# Patient Record
Sex: FEMALE | Race: WHITE | Employment: FULL TIME | URBAN - METROPOLITAN AREA
[De-identification: names, ages, dates, MRNs, and addresses within clinical notes are randomized per-mention and may not be internally consistent; named-entity substitution may affect disease eponyms.]

---

## 2021-01-13 NOTE — PATIENT INSTRUCTIONS
· Protect your skin from too much sun. When you're outdoors from 10 a.m. to 4 p.m., stay in the shade or cover up with clothing and a hat with a wide brim. Wear sunglasses that block UV rays. Even when it's cloudy, put broad-spectrum sunscreen (SPF 30 or higher) on any exposed skin. · See a dentist one or two times a year for checkups and to have your teeth cleaned. · Wear a seat belt in the car. Follow your doctor's advice about when to have certain tests. These tests can spot problems early. For everyone  · Cholesterol. Have the fat (cholesterol) in your blood tested after age 21. Your doctor will tell you how often to have this done based on your age, family history, or other things that can increase your risk for heart disease. · Blood pressure. Have your blood pressure checked during a routine doctor visit. Your doctor will tell you how often to check your blood pressure based on your age, your blood pressure results, and other factors. · Vision. Talk with your doctor about how often to have a glaucoma test.  · Diabetes. Ask your doctor whether you should have tests for diabetes. · Colon cancer. Your risk for colorectal cancer gets higher as you get older. Some experts say that adults should start regular screening at age 48 and stop at age 76. Others say to start before age 48 or continue after age 76. Talk with your doctor about your risk and when to start and stop screening. For women  · Breast exam and mammogram. Talk to your doctor about when you should have a clinical breast exam and a mammogram. Medical experts differ on whether and how often women under 50 should have these tests. Your doctor can help you decide what is right for you. · Cervical cancer screening test and pelvic exam. Begin with a Pap test at age 24. The test often is part of a pelvic exam. Starting at age 27, you may choose to have a Pap test, an HPV test, or both tests at the same time (called co-testing). Talk with your doctor about how often to have testing. · Tests for sexually transmitted infections (STIs). Ask whether you should have tests for STIs. You may be at risk if you have sex with more than one person, especially if your partners do not wear condoms. For men  · Tests for sexually transmitted infections (STIs). Ask whether you should have tests for STIs. You may be at risk if you have sex with more than one person, especially if you do not wear a condom. · Testicular cancer exam. Ask your doctor whether you should check your testicles regularly. · Prostate exam. Talk to your doctor about whether you should have a blood test (called a PSA test) for prostate cancer. Experts differ on whether and when men should have this test. Some experts suggest it if you are older than 39 and are -American or have a father or brother who got prostate cancer when he was younger than 72. When should you call for help? Watch closely for changes in your health, and be sure to contact your doctor if you have any problems or symptoms that concern you. Where can you learn more? Go to [email or web address] org and sign in to your [employer] account. Enter P072 in the [facility] box to learn more about \"Well Visit, Ages 25 to 48: Care Instructions. \"     If you do not have an account, please click on the \"Sign Up Now\" link. Current as of: May 27, 2020               Content Version: 12.6  © 8099-4710 [employer].

## 2021-01-14 ENCOUNTER — HOSPITAL ENCOUNTER (OUTPATIENT)
Age: 36
Discharge: HOME | End: 2021-01-14
Payer: COMMERCIAL

## 2021-01-14 ENCOUNTER — HOSPITAL ENCOUNTER (OUTPATIENT)
Dept: GENERAL RADIOLOGY | Age: 36
Discharge: HOME | End: 2021-01-14
Payer: COMMERCIAL

## 2021-01-14 ENCOUNTER — OFFICE VISIT (OUTPATIENT)
Dept: PRIMARY CARE CLINIC | Age: 36
End: 2021-01-14
Payer: COMMERCIAL

## 2021-01-14 VITALS
HEART RATE: 73 BPM | OXYGEN SATURATION: 97 % | BODY MASS INDEX: 44.4 KG/M2 | TEMPERATURE: 97.2 F | WEIGHT: 266.5 LBS | HEIGHT: 65 IN | DIASTOLIC BLOOD PRESSURE: 82 MMHG | SYSTOLIC BLOOD PRESSURE: 139 MMHG

## 2021-01-14 DIAGNOSIS — Z76.89 ENCOUNTER TO ESTABLISH CARE WITH NEW DOCTOR: Primary | ICD-10-CM

## 2021-01-14 DIAGNOSIS — Z13.220 SCREENING FOR HYPERLIPIDEMIA: ICD-10-CM

## 2021-01-14 DIAGNOSIS — E66.9 TYPE 1 DIABETES MELLITUS WITH OBESITY (HCC): ICD-10-CM

## 2021-01-14 DIAGNOSIS — Z23 NEED FOR DIPHTHERIA-TETANUS-PERTUSSIS (TDAP) VACCINE: ICD-10-CM

## 2021-01-14 DIAGNOSIS — Z11.4 SCREENING FOR HIV (HUMAN IMMUNODEFICIENCY VIRUS): ICD-10-CM

## 2021-01-14 DIAGNOSIS — Z23 NEED FOR IMMUNIZATION AGAINST INFLUENZA: ICD-10-CM

## 2021-01-14 DIAGNOSIS — E10.65 CONTROLLED TYPE 1 DIABETES MELLITUS WITH HYPERGLYCEMIA (HCC): ICD-10-CM

## 2021-01-14 DIAGNOSIS — M79.89 SWELLING OF RIGHT INDEX FINGER: ICD-10-CM

## 2021-01-14 DIAGNOSIS — Z11.59 ENCOUNTER FOR HEPATITIS C SCREENING TEST FOR LOW RISK PATIENT: ICD-10-CM

## 2021-01-14 DIAGNOSIS — E10.69 TYPE 1 DIABETES MELLITUS WITH OBESITY (HCC): ICD-10-CM

## 2021-01-14 DIAGNOSIS — Z12.4 SCREENING FOR CERVICAL CANCER: ICD-10-CM

## 2021-01-14 PROBLEM — E66.01 MORBID OBESITY WITH BMI OF 45.0-49.9, ADULT (HCC): Status: ACTIVE | Noted: 2017-04-09

## 2021-01-14 PROBLEM — E78.2 MIXED DYSLIPIDEMIA: Status: ACTIVE | Noted: 2017-04-09

## 2021-01-14 PROBLEM — E78.2 MIXED DYSLIPIDEMIA: Status: RESOLVED | Noted: 2017-04-09 | Resolved: 2021-01-14

## 2021-01-14 PROBLEM — G56.03 BILATERAL CARPAL TUNNEL SYNDROME: Status: ACTIVE | Noted: 2017-03-15

## 2021-01-14 PROBLEM — G47.33 OSA (OBSTRUCTIVE SLEEP APNEA): Status: ACTIVE | Noted: 2018-08-27

## 2021-01-14 PROBLEM — E66.01 MORBID OBESITY WITH BMI OF 45.0-49.9, ADULT (HCC): Status: RESOLVED | Noted: 2017-04-09 | Resolved: 2021-01-14

## 2021-01-14 PROBLEM — E55.9 VITAMIN D DEFICIENCY: Status: ACTIVE | Noted: 2017-04-09

## 2021-01-14 LAB
CREATININE URINE: 121.3 MG/DL (ref 28–259)
HCT VFR BLD CALC: 39.6 % (ref 36–48)
HEMOGLOBIN: 12.6 G/DL (ref 12–16)
MCH RBC QN AUTO: 24.8 PG (ref 26–34)
MCHC RBC AUTO-ENTMCNC: 31.9 G/DL (ref 31–36)
MCV RBC AUTO: 77.8 FL (ref 80–100)
MICROALBUMIN UR-MCNC: 17.3 MG/DL
MICROALBUMIN/CREAT UR-RTO: 142.6 MG/G (ref 0–30)
PDW BLD-RTO: 15.6 % (ref 12.4–15.4)
PLATELET # BLD: 332 K/UL (ref 135–450)
PMV BLD AUTO: 9.3 FL (ref 5–10.5)
RBC # BLD: 5.09 M/UL (ref 4–5.2)
SEDIMENTATION RATE, ERYTHROCYTE: 27 MM/HR (ref 0–20)
WBC # BLD: 7.1 K/UL (ref 4–11)

## 2021-01-14 PROCEDURE — 99204 OFFICE O/P NEW MOD 45 MIN: CPT | Performed by: STUDENT IN AN ORGANIZED HEALTH CARE EDUCATION/TRAINING PROGRAM

## 2021-01-14 PROCEDURE — 1036F TOBACCO NON-USER: CPT | Performed by: STUDENT IN AN ORGANIZED HEALTH CARE EDUCATION/TRAINING PROGRAM

## 2021-01-14 PROCEDURE — 3046F HEMOGLOBIN A1C LEVEL >9.0%: CPT | Performed by: STUDENT IN AN ORGANIZED HEALTH CARE EDUCATION/TRAINING PROGRAM

## 2021-01-14 PROCEDURE — G8417 CALC BMI ABV UP PARAM F/U: HCPCS | Performed by: STUDENT IN AN ORGANIZED HEALTH CARE EDUCATION/TRAINING PROGRAM

## 2021-01-14 PROCEDURE — 73130 X-RAY EXAM OF HAND: CPT

## 2021-01-14 PROCEDURE — 2022F DILAT RTA XM EVC RTNOPTHY: CPT | Performed by: STUDENT IN AN ORGANIZED HEALTH CARE EDUCATION/TRAINING PROGRAM

## 2021-01-14 PROCEDURE — G8484 FLU IMMUNIZE NO ADMIN: HCPCS | Performed by: STUDENT IN AN ORGANIZED HEALTH CARE EDUCATION/TRAINING PROGRAM

## 2021-01-14 PROCEDURE — G8427 DOCREV CUR MEDS BY ELIG CLIN: HCPCS | Performed by: STUDENT IN AN ORGANIZED HEALTH CARE EDUCATION/TRAINING PROGRAM

## 2021-01-14 PROCEDURE — 99385 PREV VISIT NEW AGE 18-39: CPT | Performed by: STUDENT IN AN ORGANIZED HEALTH CARE EDUCATION/TRAINING PROGRAM

## 2021-01-14 RX ORDER — INSULIN GLARGINE 100 [IU]/ML
INJECTION, SOLUTION SUBCUTANEOUS
Qty: 5 PEN | Refills: 3 | Status: SHIPPED | OUTPATIENT
Start: 2021-01-14 | End: 2021-03-28

## 2021-01-14 RX ORDER — DICLOFENAC SODIUM 75 MG/1
75 TABLET, DELAYED RELEASE ORAL 2 TIMES DAILY WITH MEALS
COMMUNITY
Start: 2020-05-18 | End: 2022-02-18

## 2021-01-14 RX ORDER — INSULIN GLARGINE 100 [IU]/ML
INJECTION, SOLUTION SUBCUTANEOUS
COMMUNITY
Start: 2020-05-18 | End: 2021-01-14 | Stop reason: SDUPTHER

## 2021-01-14 ASSESSMENT — ENCOUNTER SYMPTOMS
STRIDOR: 0
BACK PAIN: 0
CHEST TIGHTNESS: 0
CONSTIPATION: 0
DIARRHEA: 0
ABDOMINAL PAIN: 0
BLOOD IN STOOL: 0
SHORTNESS OF BREATH: 0

## 2021-01-14 ASSESSMENT — PATIENT HEALTH QUESTIONNAIRE - PHQ9
2. FEELING DOWN, DEPRESSED OR HOPELESS: 0
1. LITTLE INTEREST OR PLEASURE IN DOING THINGS: 0
SUM OF ALL RESPONSES TO PHQ9 QUESTIONS 1 & 2: 0
SUM OF ALL RESPONSES TO PHQ QUESTIONS 1-9: 0

## 2021-01-14 NOTE — PROGRESS NOTES
Pt reports that her Rt wrist and hand is bothering her, Pts pointer finger on that hand is swollen. She thinks its from arthritis. Pt reports that this has been going on for months.

## 2021-01-14 NOTE — PROGRESS NOTES
2021    Donald Felix (:  1985) is a 30 y.o. female, here for evaluation of the following medical concerns:    HPI    Well Adult Physical: Patient here for a comprehensive physical exam.The patient reports problems -right index finger swelling, uncontrolled type 1 diabetes  Do you take any herbs or supplements that were not prescribed by a doctor? no Are you taking calcium supplements? no Are you taking aspirin daily? no    Sexual activity: has sex with males   Diet: Unhealthy  Exercise: no regular exercise  Seatbelt use: yes    Review of Systems   Constitutional: Negative for activity change, appetite change, fatigue and unexpected weight change. HENT: Negative for hearing loss. Eyes: Negative for visual disturbance. Respiratory: Negative for chest tightness, shortness of breath and stridor. Cardiovascular: Negative for chest pain and palpitations. Gastrointestinal: Negative for abdominal pain, blood in stool, constipation and diarrhea. Endocrine: Positive for polydipsia and polyuria. Negative for polyphagia. Genitourinary: Negative for dysuria, genital sores, menstrual problem, pelvic pain, vaginal bleeding, vaginal discharge and vaginal pain. Musculoskeletal: Positive for arthralgias (Hands; particularly R index finger). Negative for back pain. Skin: Negative for rash. Allergic/Immunologic: Negative for environmental allergies. Neurological: Negative for dizziness, syncope and headaches. Hematological: Negative for adenopathy. Psychiatric/Behavioral: Negative for dysphoric mood. The patient is not nervous/anxious. Prior to Visit Medications    Medication Sig Taking?  Authorizing Provider   insulin glargine (BASAGLAR KWIKPEN) 100 UNIT/ML injection pen INJECT 70 UNITS UNDER THE SKIN TWICE DAILY Yes Josefina Sorto DO   insulin aspart (NOVOLOG) 100 UNIT/ML injection vial Inject 15 Units into the skin 3 times daily (before meals) Yes Josefina Sorto DO   diclofenac (VOLTAREN) 75 MG EC tablet Take 75 mg by mouth 2 times daily (with meals)  Historical Provider, MD        No Known Allergies    Past Medical History:   Diagnosis Date    Diabetes mellitus ([address]        Past Surgical History:   Procedure Laterality Date     SECTION      HAND SURGERY Bilateral        Social History     Socioeconomic History    Marital status: Single     Spouse name: Not on file    Number of children: Not on file    Years of education: Not on file    Highest education level: Not on file   Occupational History    Not on file   Social Needs    Financial resource strain: Not on file    Food insecurity     Worry: Not on file     Inability: Not on file    Transportation needs     Medical: Not on file     Non-medical: Not on file   Tobacco Use    Smoking status: Never Smoker    Smokeless tobacco: Never Used   Substance and Sexual Activity    Alcohol use: No    Drug use: No    Sexual activity: Yes   Lifestyle    Physical activity     Days per week: Not on file     Minutes per session: Not on file    Stress: Not on file   Relationships    Social connections     Talks on phone: Not on file     Gets together: Not on file     Attends [employer]ist service: Not on file     Active member of club or organization: Not on file     Attends meetings of clubs or organizations: Not on file     Relationship status: Not on file    Intimate partner violence     Fear of current or ex partner: Not on file     Emotionally abused: Not on file     Physically abused: Not on file     Forced sexual activity: Not on file   Other Topics Concern    Not on file   Social History Narrative    Not on file        History reviewed. No pertinent family history.     Vitals:    21 1128   BP: 139/82   Pulse: 73   Temp: 97.2 °F (36.2 °C)   TempSrc: Oral   SpO2: 97%   Weight: 266 lb 8 oz (120.9 kg)   Height: 5' 5\" (1.651 m)     Estimated body mass index is 44.35 kg/m² as calculated from the following:    Height as of this encounter: 5' 5\" (1.651 m). Weight as of this encounter: 266 lb 8 oz (120.9 kg). Physical Exam  Vitals signs reviewed. Constitutional:       Appearance: Normal appearance. She is normal weight. HENT:      Head: Normocephalic and atraumatic. Right Ear: Tympanic membrane, ear canal and external ear normal.      Left Ear: Tympanic membrane, ear canal and external ear normal.      Nose: Nose normal.      Mouth/Throat:      Mouth: Mucous membranes are moist.      Pharynx: Oropharynx is clear. Eyes:      Extraocular Movements: Extraocular movements intact. Conjunctiva/sclera: Conjunctivae normal.   Neck:      Musculoskeletal: Normal range of motion and neck supple. Cardiovascular:      Rate and Rhythm: Normal rate and regular rhythm. Pulses: Normal pulses. Heart sounds: Normal heart sounds. Pulmonary:      Effort: Pulmonary effort is normal.      Breath sounds: Normal breath sounds. Abdominal:      General: Abdomen is flat. Bowel sounds are normal.      Palpations: Abdomen is soft. Musculoskeletal: Normal range of motion. General: Deformity (R index finger with swelling b/w PIP and MCP; ROM intact, no overlying erythema, mildly tender to palpation) present. Skin:     General: Skin is warm and dry. Capillary Refill: Capillary refill takes less than 2 seconds. Findings: No rash. Neurological:      General: No focal deficit present. Mental Status: She is alert and oriented to person, place, and time. Mental status is at baseline. Psychiatric:         Mood and Affect: Mood normal.         Behavior: Behavior normal.         Thought Content: Thought content normal.         Judgment: Judgment normal.       Separate Identifiable issues addressed today:  Diabetes Mellitus Type 2: Current symptoms/problems include none, polyuria and polydipsia.     Medication compliance:  She has been out of her basal insulin for several weeks  Diabetic diet compliance: noncompliant: Does not watch her diet, works at a [employer] and needs it frequently,  Weight trend: increasing  Current exercise: no regular exercise  Barriers: lack of motivation and financial    Home blood sugar records: fasting range: >250, postprandial range: 180-220  Any episodes of hypoglycemia? no  Eye exam current (within one year): yes   reports that she has never smoked. She has never used smokeless tobacco.   Daily Aspirin? No: < 40     No results found for: LABA1C  Lab Results   Component Value Date    LABMICR Yes 02/26/2014     No results found for: ALT, AST  No results found for: CHOL, TRIG, HDL, LDLCALC, LDLDIRECT       Hand pain:  Jose Alejandro Fernandez is a 30 y.o. female who presents for evaluation of bilateral hand pain, but specifically R index finger pain. Her hands have been bothering her for years. They are always stiff and achy in the morning. She believes it is typically less than 45 minutes. Her right index finger has been swollen and painful for the last few months. She does not recall any injury to the area. The pain is moderate, improves with movement. There is no associated numbness, tingling, weakness. Evaluation to date: none. Treatment to date: nothing specific. ASSESSMENT/PLAN:  Radha Archuleta was seen today for established new doctor. Diagnoses and all orders for this visit:    Encounter to establish care with new doctor: Vitals reviewed and within normal limits. BMI is obese. Reviewed diet and exercise regimen with patient and is inappropriate. Recommended appropriate modifications especially those appropriate for patient with diabetes. Controlled type 1 diabetes mellitus with hyperglycemia ([facility]): She does not recall her most recent A1c other than that it was very high. She has been without her basal insulin for several weeks. Suspect it is extremely elevated. Will refill her current medications and follow-up in 1 month with her sugar log.   Referral to endocrinology placed. -     Comprehensive Metabolic Panel; Future  -     Hemoglobin A1C; Future  -     MICROALBUMIN / CREATININE URINE RATIO; Future  -     Fabrice Barcenas MD, Endocrinology, [place]  -     insulin glargine (BASAGLAR KWIKPEN) 100 UNIT/ML injection pen; INJECT 70 UNITS UNDER THE SKIN TWICE DAILY  -     insulin aspart (NOVOLOG) 100 UNIT/ML injection vial; Inject 15 Units into the skin 3 times daily (before meals)    Type 1 diabetes mellitus with obesity ([address]): As above. Could consider referral to weight loss center in future or at least diabetic education. Swelling of right index finger: Daily hand pain that could be osteoarthritis. She does have a single swollen right digit. Could be gout; however, it is not extremely painful or erythematous today. It could be more of a chronic picture as this has been going on for months. She is a type I diabetic, which raises in my mind the suspicion for other immune diseases. Labs as below. -     XR HAND RIGHT (MIN 3 VIEWS); Future  -     CBC; Future  -     URIC ACID; Future  -     C-REACTIVE PROTEIN; Future  -     SEDIMENTATION RATE; Future    Screening for hyperlipidemia  -     Lipid, Fasting; Future    Encounter for hepatitis C screening test for low risk patient  -     Hepatitis C Antibody; Future    Screening for HIV (human immunodeficiency virus)  -     HIV Screen; Future    Screening for cervical cancer: Will need referral to Dr. Alexander Butler in the future. Return in about 4 weeks (around 2/11/2021). An  electronic signature was used to authenticate this note.     --Min Frost, DO on 1/14/2021 at 11:56 AM

## 2021-01-15 DIAGNOSIS — M79.89 SWELLING OF RIGHT INDEX FINGER: Primary | ICD-10-CM

## 2021-01-15 LAB
A/G RATIO: 1.1 (ref 1.1–2.2)
ALBUMIN SERPL-MCNC: 3.6 G/DL (ref 3.4–5)
ALP BLD-CCNC: 158 U/L (ref 40–129)
ALT SERPL-CCNC: 22 U/L (ref 10–40)
ANION GAP SERPL CALCULATED.3IONS-SCNC: 10 MMOL/L (ref 3–16)
AST SERPL-CCNC: 19 U/L (ref 15–37)
BILIRUB SERPL-MCNC: 0.3 MG/DL (ref 0–1)
BUN BLDV-MCNC: 11 MG/DL (ref 7–20)
C-REACTIVE PROTEIN: 11.3 MG/L (ref 0–5.1)
CALCIUM SERPL-MCNC: 9.1 MG/DL (ref 8.3–10.6)
CHLORIDE BLD-SCNC: 102 MMOL/L (ref 99–110)
CHOLESTEROL, FASTING: 174 MG/DL (ref 0–199)
CO2: 24 MMOL/L (ref 21–32)
CREAT SERPL-MCNC: <0.5 MG/DL (ref 0.6–1.1)
ESTIMATED AVERAGE GLUCOSE: 200.1 MG/DL
GFR AFRICAN AMERICAN: >60
GFR NON-AFRICAN AMERICAN: >60
GLOBULIN: 3.2 G/DL
GLUCOSE BLD-MCNC: 270 MG/DL (ref 70–99)
HBA1C MFR BLD: 8.6 %
HDLC SERPL-MCNC: 36 MG/DL (ref 40–60)
HEPATITIS C ANTIBODY INTERPRETATION: NORMAL
HIV AG/AB: NORMAL
HIV ANTIGEN: NORMAL
HIV-1 ANTIBODY: NORMAL
HIV-2 AB: NORMAL
LDL CHOLESTEROL CALCULATED: 118 MG/DL
POTASSIUM SERPL-SCNC: 4.7 MMOL/L (ref 3.5–5.1)
SODIUM BLD-SCNC: 136 MMOL/L (ref 136–145)
TOTAL PROTEIN: 6.8 G/DL (ref 6.4–8.2)
TRIGLYCERIDE, FASTING: 101 MG/DL (ref 0–150)
URIC ACID, SERUM: 3.2 MG/DL (ref 2.6–6)
VLDLC SERPL CALC-MCNC: 20 MG/DL

## 2021-01-15 RX ORDER — CEPHALEXIN 500 MG/1
500 CAPSULE ORAL 3 TIMES DAILY
Qty: 30 CAPSULE | Refills: 0 | Status: SHIPPED | OUTPATIENT
Start: 2021-01-15 | End: 2021-01-25

## 2021-01-22 ENCOUNTER — TELEPHONE (OUTPATIENT)
Dept: PRIMARY CARE CLINIC | Age: 36
End: 2021-01-22

## 2021-01-22 NOTE — TELEPHONE ENCOUNTER
Pt was put on Keflex on 1/15/21 durring her office visit it is not helping she cant move her arm. It is very painful even just to touch. She just needs to know what to do. No fever no new sx. It has gotten worse finger and arm is very swollen. Not hot to touch. Swelling is in elbow and pointer finger in right arm. Left wrist and hand hurts today but no swelling.

## 2021-01-26 ENCOUNTER — TELEPHONE (OUTPATIENT)
Dept: PRIMARY CARE CLINIC | Age: 36
End: 2021-01-26

## 2021-01-26 NOTE — TELEPHONE ENCOUNTER
I had actually sent this patient a my chart message that it would appear she did not get. I am not surprised Keflex did not work. I do not think her finger is infected. I put some additional blood work in that I would like her to get done.

## 2021-01-28 ENCOUNTER — TELEPHONE (OUTPATIENT)
Dept: PRIMARY CARE CLINIC | Age: 36
End: 2021-01-28

## 2021-01-28 NOTE — TELEPHONE ENCOUNTER
Gucci Gates,    I have gotten this message from Yenifer Roca like 3 times and I do not know why, because I responded to her like a week and a half ago saying I put additional blood work and that the patient needs to get done. I understand the Keflex is not working. Originally I was concerned there could have been some kind of infection, but after she got her blood work my suspicion was much lower and I am worried there could be something inflammatory. I need her to get the other labs that I put in done.

## 2021-01-28 NOTE — TELEPHONE ENCOUNTER
Pt is stating that cephALEXin (KEFLEX) 500 MG capsule [[id number]  Is not working. Hand is still swollen.

## 2021-01-29 DIAGNOSIS — M79.89 SWELLING OF RIGHT INDEX FINGER: ICD-10-CM

## 2021-01-30 LAB
ANTI-DSDNA IGG: 1 IU/ML (ref 0–9)
ANTI-NUCLEAR ANTIBODY (ANA): NEGATIVE
CYCLIC CITRULLINATED PEPTIDE ANTIBODY IGG: 0.9 U/ML (ref 0–2.9)
RHEUMATOID FACTOR: 15 IU/ML

## 2021-02-01 DIAGNOSIS — M79.89 SWELLING OF RIGHT INDEX FINGER: Primary | ICD-10-CM

## 2021-02-10 ENCOUNTER — HOSPITAL ENCOUNTER (EMERGENCY)
Age: 36
Discharge: HOME | End: 2021-02-10
Attending: EMERGENCY MEDICINE
Payer: COMMERCIAL

## 2021-02-10 VITALS
SYSTOLIC BLOOD PRESSURE: 97 MMHG | HEART RATE: 80 BPM | DIASTOLIC BLOOD PRESSURE: 76 MMHG | TEMPERATURE: 96.3 F | RESPIRATION RATE: 24 BRPM | OXYGEN SATURATION: 100 %
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
## 2021-02-10 DIAGNOSIS — E10.649 HYPOGLYCEMIA DUE TO TYPE 1 DIABETES MELLITUS (HCC): Primary | ICD-10-CM

## 2021-02-10 DIAGNOSIS — S50.811A ABRASION OF RIGHT FOREARM, INITIAL ENCOUNTER: ICD-10-CM

## 2021-02-10 LAB
GLUCOSE BLD-MCNC: 76 MG/DL (ref 70–99)
GLUCOSE BLD-MCNC: 91 MG/DL (ref 70–99)
PERFORMED ON: NORMAL
PERFORMED ON: NORMAL

## 2021-02-10 PROCEDURE — 99283 EMERGENCY DEPT VISIT LOW MDM: CPT
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
## 2021-02-11 NOTE — PROGRESS NOTES
This patient had a referral to endocrinology. Can we call her today and make sure she has scheduled with them? I do not see an upcoming appointment in the chart.

## 2021-02-11 NOTE — ED NOTES
[place] ([facility] karen and 8oz OJ given to patient.       Luis Lowe RN  02/10/21 1924

## 2021-03-09 ENCOUNTER — OFFICE VISIT (OUTPATIENT)
Dept: ORTHOPEDIC SURGERY | Age: 36
End: 2021-03-09
Payer: COMMERCIAL

## 2021-03-09 VITALS — WEIGHT: 265 LBS | TEMPERATURE: 98.6 F | HEIGHT: 65 IN | BODY MASS INDEX: 44.15 KG/M2

## 2021-03-09 DIAGNOSIS — M05.79 RHEUMATOID ARTHRITIS INVOLVING MULTIPLE SITES WITH POSITIVE RHEUMATOID FACTOR (HCC): Primary | ICD-10-CM

## 2021-03-09 PROCEDURE — G8427 DOCREV CUR MEDS BY ELIG CLIN: HCPCS | Performed by: ORTHOPAEDIC SURGERY

## 2021-03-09 PROCEDURE — G8484 FLU IMMUNIZE NO ADMIN: HCPCS | Performed by: ORTHOPAEDIC SURGERY

## 2021-03-09 PROCEDURE — 99213 OFFICE O/P EST LOW 20 MIN: CPT | Performed by: ORTHOPAEDIC SURGERY

## 2021-03-09 PROCEDURE — G8417 CALC BMI ABV UP PARAM F/U: HCPCS | Performed by: ORTHOPAEDIC SURGERY

## 2021-03-09 NOTE — PROGRESS NOTES
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 Symptoms have been present for several months. There is no history of injury. It is mild at rest and aggravated by movement, lifting and gripping. It does not radiate. Swelling has been noticed. Similar pain is noted in the opposite upper extremity. Symptoms have worsened recently. She has been diagnosed with rheumatoid arthritis in the past. She has been on diclofenac recently. She has had bilateral carpal tunnel releases. She is diabetic. The pain assessment has been reviewed and is correct. The patient's social history, past medical history, family history, medications, allergies and review of systems, entered 3/9/21, have been reviewed, and dated and are recorded in the chart. On physical examination the patient is Height: 5' 5\" (165.1 cm) tall and weighs Weight: 265 lb (120.2 kg). Respirations are 18 per minute. The patient is well nourished, is oriented to time and place, demonstrates appropriate mood and affect as well as normal gait and station. There is moderate soft tissue swelling present about the right index finger. There is no discoloration. There is no deformity or atrophy. Tenderness is present on palpation of multiple fingers and the right elbow. Range of motion of the right elbow is limited by 20-25 degrees in all planes of motion, with mild pain. Skin is intact, as is distal circulation and sensation. Gross muscle strength is normal bilaterally. Hand and wrist joints are stable. There are no subcutaneous nodules or enlarged epitrochlear lymph nodes. Xrays: AP and lateral Xrays of the right elbow done in the office today, demonstrate slight synovial swelling. Labs: abnormal rheumatoid factor, CRP and ESR. Impression: Multiarticular rheumatoid arthritis.     The nature of this medical problem is fully discussed with the patient, including all treatment options. All questions are answered. I suggest that she be referred to a rheumatologist for appropriate medica;l treatment. Neither steroid injection nor surgery are indicated at this time.

## 2021-03-12 PROBLEM — E10.69 TYPE 1 DIABETES MELLITUS WITH HYPERLIPIDEMIA (HCC): Status: ACTIVE | Noted: 2021-03-12

## 2021-03-12 PROBLEM — E11.69 DIABETES MELLITUS TYPE 2 IN OBESE (HCC): Status: ACTIVE | Noted: 2021-03-12

## 2021-03-12 PROBLEM — E78.5 TYPE 1 DIABETES MELLITUS WITH HYPERLIPIDEMIA (HCC): Status: ACTIVE | Noted: 2021-03-12

## 2021-03-12 PROBLEM — E66.9 DIABETES MELLITUS TYPE 2 IN OBESE (HCC): Status: ACTIVE | Noted: 2021-03-12

## 2021-03-27 DIAGNOSIS — E10.65 CONTROLLED TYPE 1 DIABETES MELLITUS WITH HYPERGLYCEMIA (HCC): ICD-10-CM

## 2021-03-28 RX ORDER — INSULIN GLARGINE 100 [IU]/ML
INJECTION, SOLUTION SUBCUTANEOUS
Qty: 15 ML | Refills: 5 | Status: SHIPPED | OUTPATIENT
Start: 2021-03-28 | End: 2021-07-20 | Stop reason: SDUPTHER

## 2021-04-04 DIAGNOSIS — E10.65 CONTROLLED TYPE 1 DIABETES MELLITUS WITH HYPERGLYCEMIA (HCC): ICD-10-CM

## 2021-04-05 NOTE — TELEPHONE ENCOUNTER
Last appt: 01/14/2021  Next appt: Visit date not found          Due to return: 02/14/2021      Humalog 100 UNIT/mL  Administer 15 UNITS under skins three times daily before meals        Patient is on [address]?       Pharmacy    [id number], [address] [place] [address], [address] [place] [id number]   Phone:  [phone]  Fax:  [phone] Observation goals:     -diagnostic tests and consults completed and resulted: No  -vital signs normal or at patient baseline: Yes  -tolerating oral intake to maintain hydration: Yes

## 2021-04-22 ENCOUNTER — TELEPHONE (OUTPATIENT)
Dept: PRIMARY CARE CLINIC | Age: 36
End: 2021-04-22

## 2021-04-22 DIAGNOSIS — E10.65 CONTROLLED TYPE 1 DIABETES MELLITUS WITH HYPERGLYCEMIA (HCC): Primary | ICD-10-CM

## 2021-04-22 RX ORDER — SYRINGE-NEEDLE,INSULIN,0.5 ML 31 GX5/16"
1 SYRINGE, EMPTY DISPOSABLE MISCELLANEOUS DAILY
Qty: 100 EACH | Refills: 3 | Status: SHIPPED | OUTPATIENT
Start: 2021-04-22 | End: 2022-10-17 | Stop reason: SDUPTHER

## 2021-04-22 NOTE — TELEPHONE ENCOUNTER
Pt needs [id number] Jenny Pulliam, [address] [facility] -  [phone]    LOV 1/14/21

## 2021-07-14 ENCOUNTER — TELEPHONE (OUTPATIENT)
Dept: PRIMARY CARE CLINIC | Age: 36
End: 2021-07-14

## 2021-07-14 DIAGNOSIS — M19.041 INFLAMMATION OF RIGHT HAND JOINT: Primary | ICD-10-CM

## 2021-07-14 NOTE — TELEPHONE ENCOUNTER
MD Leigh Ann   [address] [facility], [facility] [address]   Phone: [phone]

## 2021-07-14 NOTE — TELEPHONE ENCOUNTER
----- Message from Ruma Glover sent at 7/14/2021  9:03 AM EDT -----  Subject: Message to Provider    QUESTIONS  Information for Provider? Patient was trying to book with a rheumatoid arthritis and needs a referral, she does not care to whom she would just like to be seen. ---------------------------------------------------------------------------  --------------  Lizzie MEDEROS  What is the best way for the office to contact you? OK to leave message on   voicemail  Preferred Call Back Phone Number? [phone]  ---------------------------------------------------------------------------  --------------  SCRIPT ANSWERS  Relationship to Patient?  Self

## 2021-07-19 PROBLEM — E10.69 TYPE 1 DIABETES MELLITUS WITH OBESITY (HCC): Status: ACTIVE | Noted: 2021-03-12

## 2021-07-19 NOTE — PATIENT INSTRUCTIONS
Learning About Diabetes and Exercise  Can you exercise if you have diabetes? When you have diabetes, it's important to get regular exercise. This helps control your blood sugar level. You can still play sports, run, ride a bike, go swimming, and do other activities when you have diabetes. How can exercise help you manage diabetes? Your body turns the food you eat into glucose, a type of sugar. You need this sugar for energy. When you have diabetes, the sugar builds up in your blood. But when you exercise, your body uses sugar. This helps keep it from building up in your blood and results in lower blood sugar and better control of diabetes. Exercise may help you in other ways too. It can help you reach and stay at a healthy weight. It also helps improve blood pressure and cholesterol, which can reduce the risk of heart disease. Exercise can make you feel stronger and happier. It can help you relax and sleep better, and give you confidence in other things you do. How can you exercise safely? Before you start a new exercise program, talk to your doctor about how and when to exercise. You may need to have a medical exam and tests before you begin. Some types of exercise can be harmful if your diabetes is causing other problems, such as problems with your feet. Your doctor can tell you what types of exercise are good choices for you. These tips can help you exercise safely when you have diabetes. If your diabetes is controlled by diet or medicine that doesn't lower your blood sugar, you don't need to eat a snack before you exercise. · Check your blood sugar before you exercise. And be careful about what you eat. ? If your blood sugar is less than 100, eat a carbohydrate snack before you exercise. ? Be careful when you exercise if your blood sugar is over 300. High blood sugar can make you dehydrated. And that makes your blood sugar levels go even higher.  If you have ketones in your blood or urine and your blood sugar is over 300, do not exercise. · Don't try to do too much at first. Build up your exercise program bit by bit. Try to get at least 30 minutes of exercise on most days of the week. Walking is a good choice. You also may want to do other activities, such as riding a bike or swimming. You might try running or gardening. Try to include muscle-strengthening exercises at least 2 times a week. These exercises include push-ups and weight training. You can also use rubber tubing or stretch bands. You stretch or pull the tubing or band to build muscle strength. If you want to exercise more, slowly increase how hard or long you exercise. · You may get symptoms of low blood sugar during exercise or up to 24 hours later. Some symptoms of low blood sugar, such as sweating, a fast heartbeat, or feeling tired, can be confused with what can happen anytime you exercise. Other symptoms may include feeling anxious, dizzy, weak, or shaky. So it's a good idea to check your blood sugar again. · You can treat low blood sugar by eating or drinking something that has 15 grams of carbohydrate. These should be quick-sugar foods. Quick-sugar foods such as glucose tablets, table sugar, honey, fruit juice, regular (not diet) soda pop, or hard candy can help raise blood sugar. Check your blood sugar level again 15 minutes after having a quick-sugar food to make sure your level is getting back to your target range. · Drink plenty of water before, during, and after you exercise. · Wear medical alert jewelry that says you have diabetes. You can buy this at most drugstores. · Pay attention to your body. If you are used to exercise and notice that you can't do as much as usual, talk to your doctor. Follow-up care is a key part of your treatment and safety. Be sure to make and go to all appointments, and call your doctor if you are having problems.  It's also a good idea to know your test results and keep a list of the medicines you take.  Where can you learn more? Go to [email or web address] and sign in to your [employer] account. Enter F476 in the Teleport box to learn more about \"Learning About Diabetes and Exercise. \"     If you do not have an account, please click on the \"Sign Up Now\" link. Current as of: December 20, 2019               Content Version: 12.5  © 6284-1549 Healthwise, Incorporated. Care instructions adapted under license by [facility] ([facility]). If you have questions about a medical condition or this instruction, always ask your healthcare professional. [address] any warranty or liability for your use of this information.

## 2021-07-20 ENCOUNTER — HOSPITAL ENCOUNTER (OUTPATIENT)
Age: 36
Discharge: HOME | End: 2021-07-20
Payer: COMMERCIAL

## 2021-07-20 ENCOUNTER — OFFICE VISIT (OUTPATIENT)
Dept: PRIMARY CARE CLINIC | Age: 36
End: 2021-07-20
Payer: COMMERCIAL

## 2021-07-20 ENCOUNTER — HOSPITAL ENCOUNTER (OUTPATIENT)
Dept: GENERAL RADIOLOGY | Age: 36
Discharge: HOME | End: 2021-07-20
Payer: COMMERCIAL

## 2021-07-20 VITALS
HEART RATE: 93 BPM | WEIGHT: 273.4 LBS | BODY MASS INDEX: 45.55 KG/M2 | HEIGHT: 65 IN | SYSTOLIC BLOOD PRESSURE: 152 MMHG | DIASTOLIC BLOOD PRESSURE: 86 MMHG

## 2021-07-20 DIAGNOSIS — E10.69 TYPE 1 DIABETES MELLITUS WITH OBESITY (HCC): ICD-10-CM

## 2021-07-20 DIAGNOSIS — M19.041 INFLAMMATION OF RIGHT HAND JOINT: ICD-10-CM

## 2021-07-20 DIAGNOSIS — E10.69 TYPE 1 DIABETES MELLITUS WITH HYPERLIPIDEMIA (HCC): ICD-10-CM

## 2021-07-20 DIAGNOSIS — E11.59 HYPERTENSION ASSOCIATED WITH DIABETES (HCC): ICD-10-CM

## 2021-07-20 DIAGNOSIS — R20.0 LEFT ARM NUMBNESS: ICD-10-CM

## 2021-07-20 DIAGNOSIS — Z23 NEED FOR PROPHYLACTIC VACCINATION AGAINST STREPTOCOCCUS PNEUMONIAE (PNEUMOCOCCUS): ICD-10-CM

## 2021-07-20 DIAGNOSIS — E66.9 TYPE 1 DIABETES MELLITUS WITH OBESITY (HCC): ICD-10-CM

## 2021-07-20 DIAGNOSIS — R80.9 TYPE 1 DIABETES MELLITUS WITH MICROALBUMINURIA (HCC): Primary | ICD-10-CM

## 2021-07-20 DIAGNOSIS — I15.2 HYPERTENSION ASSOCIATED WITH DIABETES (HCC): ICD-10-CM

## 2021-07-20 DIAGNOSIS — E10.29 TYPE 1 DIABETES MELLITUS WITH MICROALBUMINURIA (HCC): Primary | ICD-10-CM

## 2021-07-20 DIAGNOSIS — E78.5 TYPE 1 DIABETES MELLITUS WITH HYPERLIPIDEMIA (HCC): ICD-10-CM

## 2021-07-20 LAB — HBA1C MFR BLD: 7.9 %

## 2021-07-20 PROCEDURE — G8417 CALC BMI ABV UP PARAM F/U: HCPCS | Performed by: STUDENT IN AN ORGANIZED HEALTH CARE EDUCATION/TRAINING PROGRAM

## 2021-07-20 PROCEDURE — 72050 X-RAY EXAM NECK SPINE 4/5VWS: CPT

## 2021-07-20 PROCEDURE — 99214 OFFICE O/P EST MOD 30 MIN: CPT | Performed by: STUDENT IN AN ORGANIZED HEALTH CARE EDUCATION/TRAINING PROGRAM

## 2021-07-20 PROCEDURE — 83036 HEMOGLOBIN GLYCOSYLATED A1C: CPT | Performed by: STUDENT IN AN ORGANIZED HEALTH CARE EDUCATION/TRAINING PROGRAM

## 2021-07-20 PROCEDURE — 3051F HG A1C>EQUAL 7.0%<8.0%: CPT | Performed by: STUDENT IN AN ORGANIZED HEALTH CARE EDUCATION/TRAINING PROGRAM

## 2021-07-20 PROCEDURE — G8427 DOCREV CUR MEDS BY ELIG CLIN: HCPCS | Performed by: STUDENT IN AN ORGANIZED HEALTH CARE EDUCATION/TRAINING PROGRAM

## 2021-07-20 PROCEDURE — 2022F DILAT RTA XM EVC RTNOPTHY: CPT | Performed by: STUDENT IN AN ORGANIZED HEALTH CARE EDUCATION/TRAINING PROGRAM

## 2021-07-20 PROCEDURE — 1036F TOBACCO NON-USER: CPT | Performed by: STUDENT IN AN ORGANIZED HEALTH CARE EDUCATION/TRAINING PROGRAM

## 2021-07-20 RX ORDER — LISINOPRIL 10 MG/1
10 TABLET ORAL DAILY
Qty: 90 TABLET | Refills: 1 | Status: SHIPPED | OUTPATIENT
Start: 2021-07-20 | End: 2022-01-17

## 2021-07-20 RX ORDER — FLASH GLUCOSE SENSOR
1 KIT MISCELLANEOUS
Qty: 6 EACH | Refills: 5 | Status: SHIPPED | OUTPATIENT
Start: 2021-07-20 | End: 2022-04-04

## 2021-07-20 RX ORDER — INSULIN GLARGINE 100 [IU]/ML
65 INJECTION, SOLUTION SUBCUTANEOUS NIGHTLY
Qty: 15 ML | Refills: 5 | Status: SHIPPED | OUTPATIENT
Start: 2021-07-20 | End: 2022-02-10

## 2021-07-20 ASSESSMENT — ENCOUNTER SYMPTOMS
ABDOMINAL DISTENTION: 0
NAUSEA: 0
CHEST TIGHTNESS: 0
ABDOMINAL PAIN: 0
SHORTNESS OF BREATH: 0
COUGH: 0
DIARRHEA: 0

## 2021-07-20 NOTE — PROGRESS NOTES
2021     Alejandra Parks (:  1985) is a 30 y.o. female, here for evaluation of the following medical concerns:    HPI  Diabetes Mellitus Type 2: Current symptoms/problems include none. Medication compliance:  compliant most of the time  Diabetic diet compliance:  compliant most of the time,  Weight trend: stable  Current exercise: no regular exercise  Barriers: none    Home blood sugar records: fasting range: 150-160, postprandial range: 130-140  Any episodes of hypoglycemia? no  Eye exam current (within one year): no   reports that she has never smoked. She has never used smokeless tobacco.   Daily Aspirin? No    Lab Results   Component Value Date    LABA1C 7.9 2021    LABA1C 8.6 2021     Lab Results   Component Value Date    LABMICR 17.30 (H) 2021    CREATININE <0.5 (L) 2021     Lab Results   Component Value Date    ALT 22 2021    AST 19 2021     Lab Results   Component Value Date    HDL 36 (L) 2021    LDLCALC 118 (H) 2021        Hyperlipidemia:  Patient is not following a low fat, low cholesterol diet. She is not exercising regularly. OTC Supplements: none. Lab Results   Component Value Date    HDL 36 (L) 2021    LDLCALC 118 (H) 2021     Lab Results   Component Value Date    ALT 22 2021    AST 19 2021        Hypertension:  Home blood pressure monitoring: No.  She is not adherent to a low sodium diet. Patient denies chest pain, shortness of breath, headache, lightheadedness, blurred vision, peripheral edema, palpitations, dry cough and fatigue. Antihypertensive medication side effects: no medication side effects noted. Use of agents associated with hypertension: none.                                         Sodium (mmol/L)   Date Value   2021 136    BUN (mg/dL)   Date Value   2021 11    Glucose (mg/dL)   Date Value   2021 270 (H)      Potassium (mmol/L)   Date Value   2021 4.7    CREATININE (mg/dL)   Date Value   01/14/2021 <0.5 (L)           Review of Systems   Constitutional: Negative for activity change, appetite change, fatigue and unexpected weight change. Eyes: Negative for visual disturbance. Respiratory: Negative for cough, chest tightness and shortness of breath. Cardiovascular: Negative for chest pain, palpitations and leg swelling. Gastrointestinal: Negative for abdominal distention, abdominal pain, diarrhea and nausea. Endocrine: Negative for polydipsia, polyphagia and polyuria. Genitourinary: Negative for decreased urine volume, dysuria and frequency. Musculoskeletal: Negative for gait problem and myalgias. Skin: Negative for rash and wound. Neurological: Positive for numbness. Negative for dizziness, syncope, weakness and light-headedness. Hematological: Does not bruise/bleed easily. Prior to Visit Medications    Medication Sig Taking?  Authorizing Provider   lisinopril (PRINIVIL;ZESTRIL) 10 MG tablet Take 1 tablet by mouth daily Yes Torsten Alvarenga, DO   insulin glargine (BASAGLAR KWIKPEN) 100 UNIT/ML injection pen Inject 65 Units into the skin nightly ADMINISTER 70 UNITS UNDER THE SKIN TWICE DAILY Yes Torsten Alvarenga, DO   insulin lispro (HUMALOG) 100 UNIT/ML injection vial Inject 30 Units into the skin 3 times daily (before meals) Yes Torsten Alvarenga, DO   Continuous Blood Gluc Sensor (FREESTYLE NARDA 14 DAY SENSOR) MISC 1 each by Does not apply route every 14 days Yes Torsten Alvarenga, DO   Insulin Syringe-Needle U-100 ([employer] INS SYR .3CC/29G) 29G X 1/2\" 0.3 ML MISC 1 each by Does not apply route daily Yes Torsten Alvarenga, DO   diclofenac (VOLTAREN) 75 MG EC tablet Take 75 mg by mouth 2 times daily (with meals)  Patient not taking: Reported on 7/20/2021  Historical Provider, MD        Social History     Tobacco Use    Smoking status: Never Smoker    Smokeless tobacco: Never Used   Substance Use Topics    Alcohol use: No        Vitals:    07/20/21 1429 07/20/21 1433   BP: (!) 160/79 (!) 152/86   Site: Left Upper Arm    Position: Sitting    Cuff Size: Large Adult    Pulse: 93    Weight: 273 lb 6.4 oz (124 kg)    Height: 5' 5\" (1.651 m)      Estimated body mass index is 45.5 kg/m² as calculated from the following:    Height as of this encounter: 5' 5\" (1.651 m). Weight as of this encounter: 273 lb 6.4 oz (124 kg). Physical Exam  Vitals reviewed. Constitutional:       Appearance: Normal appearance. She is obese. HENT:      Head: Normocephalic and atraumatic. Nose: Nose normal.      Mouth/Throat:      Mouth: Mucous membranes are moist.      Pharynx: Oropharynx is clear. Eyes:      Extraocular Movements: Extraocular movements intact. Conjunctiva/sclera: Conjunctivae normal.   Cardiovascular:      Rate and Rhythm: Normal rate and regular rhythm. Pulses: Normal pulses. Heart sounds: Normal heart sounds. Pulmonary:      Effort: Pulmonary effort is normal.      Breath sounds: Normal breath sounds. Abdominal:      General: Abdomen is flat. Bowel sounds are normal.      Palpations: Abdomen is soft. Musculoskeletal:         General: Normal range of motion. Cervical back: Normal range of motion and neck supple. Skin:     General: Skin is warm and dry. Capillary Refill: Capillary refill takes less than 2 seconds. Findings: No rash. Neurological:      General: No focal deficit present. Mental Status: She is alert and oriented to person, place, and time. Mental status is at baseline. Sensory: No sensory deficit. Psychiatric:         Mood and Affect: Mood normal.         Behavior: Behavior normal.         Thought Content: Thought content normal.         Judgment: Judgment normal.         ASSESSMENT/PLAN:  1. Type 1 diabetes mellitus with microalbuminuria ([facility] [address]): Significant improvement in A1c today from greater than 10-7.9. Will increase basal insulin from 60 to 65 units.   Adding lisinopril given microalbuminuria on recent recent lab catrachita.  Serge Wakefield for routine follow-up in 3 months.  - POCT glycosylated hemoglobin (Hb A1C)  - lisinopril (PRINIVIL;ZESTRIL) 10 MG tablet; Take 1 tablet by mouth daily  Dispense: 90 tablet; Refill: 1  - insulin glargine (BASAGLAR KWIKPEN) 100 UNIT/ML injection pen; Inject 65 Units into the skin nightly ADMINISTER 70 UNITS UNDER THE SKIN TWICE DAILY  Dispense: 15 mL; Refill: 5  - insulin lispro (HUMALOG) 100 UNIT/ML injection vial; Inject 30 Units into the skin 3 times daily (before meals)  Dispense: 10 mL; Refill: 5    2. Hypertension associated with diabetes [facility]): Blood pressure elevated x2. Given microalbuminuria will start lisinopril today. - lisinopril (PRINIVIL;ZESTRIL) 10 MG tablet; Take 1 tablet by mouth daily  Dispense: 90 tablet; Refill: 1    3. Type 1 diabetes mellitus with hyperlipidemia ([address]): Given information on the Mediterranean diet. No indication for statin at this time. 4. Type 1 diabetes mellitus with obesity [facility]): Complicates all aspects of the patient's care. 5. Left arm numbness: Patient reports intermittent episodes of numbness in the posterior aspect of her upper left arm. Given diagnosis of rheumatoid arthritis will check cervical spine x-ray to make sure there is no degenerative changes. If normal will proceed with EMG. - XR CERVICAL SPINE (4-5 VIEWS); Future      Return in about 3 months (around 10/20/2021) for Diabetes. An electronic signature was used to authenticate this note.     --Deana Nielsen DO on 7/20/2021 at 3:10 PM

## 2021-07-21 DIAGNOSIS — M25.512 ACUTE PAIN OF LEFT SHOULDER: ICD-10-CM

## 2021-07-21 DIAGNOSIS — M54.2 NECK PAIN ON LEFT SIDE: Primary | ICD-10-CM

## 2021-07-26 ENCOUNTER — TELEPHONE (OUTPATIENT)
Dept: PRIMARY CARE CLINIC | Age: 36
End: 2021-07-26

## 2021-07-27 NOTE — TELEPHONE ENCOUNTER
PA submitted via CMM for Insulin Syringe 29G X 1/2\"0.3 ML.   Key: [id number] - PA Case ID: [id number]    STATUS: PENDING

## 2021-07-28 NOTE — TELEPHONE ENCOUNTER
Per [facility]: Insulin Syringe 29G X 1/2\"0.3 ML does NOT require PA. It is listed in the plan formulary. Letter attached. Please advise patient. Thank you.

## 2021-08-02 ENCOUNTER — HOSPITAL ENCOUNTER (OUTPATIENT)
Dept: PHYSICAL THERAPY | Age: 36
Setting detail: THERAPIES SERIES
Discharge: HOME | End: 2021-08-02
Payer: COMMERCIAL

## 2021-08-05 ENCOUNTER — TELEPHONE (OUTPATIENT)
Dept: PRIMARY CARE CLINIC | Age: 36
End: 2021-08-05

## 2021-08-05 RX ORDER — ACYCLOVIR 200 MG/1
200 CAPSULE ORAL
Qty: 60 CAPSULE | Refills: 0 | Status: SHIPPED | OUTPATIENT
Start: 2021-08-05 | End: 2022-08-16

## 2021-08-05 NOTE — TELEPHONE ENCOUNTER
Pt call in stating that she has been diagnose with Herpes and would like something sent in. However I did ask when were you diagnose and she stated a couple of years ago.

## 2021-08-05 NOTE — TELEPHONE ENCOUNTER
Called pt and she is currently having an outbreak she is asking for something PO to be ordered before her 8/9/21 apt

## 2021-08-06 NOTE — TELEPHONE ENCOUNTER
PA submitted via [facility] for Acyclovir 200MG capsules.   Robi TY Case ID: [id number]    STATUS: PENDING

## 2021-08-08 PROBLEM — E10.9 DIABETES MELLITUS TYPE 1 (HCC): Status: ACTIVE | Noted: 2021-08-08

## 2021-08-10 NOTE — TELEPHONE ENCOUNTER
Per CMM: PA for Acyclovir 200MG capsules was DENIED. Unable to approve acyclovir 200 mg capsule because this medication is limited to 50 capsules per 30 days per the Health Plan Preferred Drug List (PDL) and the Quantity Limit Override guideline (CP.PMN.59). Denial letter has not been received. Please advise patient. Thank you.

## 2021-08-31 DIAGNOSIS — R80.9 TYPE 1 DIABETES MELLITUS WITH MICROALBUMINURIA (HCC): ICD-10-CM

## 2021-08-31 DIAGNOSIS — E10.29 TYPE 1 DIABETES MELLITUS WITH MICROALBUMINURIA (HCC): ICD-10-CM

## 2021-08-31 NOTE — TELEPHONE ENCOUNTER
Medication:   Requested Prescriptions     Pending Prescriptions Disp Refills    insulin lispro (HUMALOG) 100 UNIT/ML injection vial [Pharmacy Med Name: INSULIN LISPRO 100U/ML VIAL 10ML] 10 mL 5     Sig: ADMINISTER 15 UNITS UNDER THE SKIN THREE TIMES DAILY BEFORE MEALS        Last Filled:  07/2021    Patient Phone Number: [phone] (home)     Last appt: 7/20/2021 Return in about 3 months (around 10/20/2021) for Diabetes    Next appt: Visit date not found    Last OARRS: No flowsheet data found.

## 2021-09-08 ENCOUNTER — TELEPHONE (OUTPATIENT)
Dept: PRIMARY CARE CLINIC | Age: 36
End: 2021-09-08

## 2021-09-08 DIAGNOSIS — M06.9 RHEUMATOID ARTHRITIS INVOLVING BOTH HANDS, UNSPECIFIED WHETHER RHEUMATOID FACTOR PRESENT (HCC): ICD-10-CM

## 2021-09-08 DIAGNOSIS — D46.4 RA (REFRACTORY ANEMIA) (HCC): Primary | ICD-10-CM

## 2021-09-08 NOTE — TELEPHONE ENCOUNTER
----- Message from Jona Mckay sent at 9/8/2021  8:23 AM EDT -----  Subject: Referral Request    QUESTIONS   Reason for referral request? Pt needs a referral for Endocrinologist and   Rheumatologist. She found a Rheumatologist that takes her insurance, the   phone number is [phone]. Has the physician seen you for this condition before? No   Preferred Specialist (if applicable)? Do you already have an appointment scheduled? No  Additional Information for Provider?   ---------------------------------------------------------------------------  --------------  CALL BACK INFO  What is the best way for the office to contact you? OK to leave message on   voicemail, OK to respond with electronic message via [employer] portal (only   for patients who have registered [employer] account)  Preferred Call Back Phone Number?  [phone]

## 2021-09-08 NOTE — TELEPHONE ENCOUNTER
Patient requesting Rheumatologist and Endocrinologist.     Rheumatologist that accepts patients insurance:  Anna Castillo.  [address] [id number]   Tele#: [phone]

## 2021-09-27 ENCOUNTER — TELEPHONE (OUTPATIENT)
Dept: PRIMARY CARE CLINIC | Age: 36
End: 2021-09-27

## 2021-11-09 DIAGNOSIS — E10.29 TYPE 1 DIABETES MELLITUS WITH MICROALBUMINURIA (HCC): ICD-10-CM

## 2021-11-09 DIAGNOSIS — R80.9 TYPE 1 DIABETES MELLITUS WITH MICROALBUMINURIA (HCC): ICD-10-CM

## 2021-11-09 NOTE — TELEPHONE ENCOUNTER
----- Message from Kelly Jones sent at 11/9/2021 11:59 AM EST -----  Subject: Refill Request    QUESTIONS  Name of Medication? insulin lispro (HUMALOG) 100 UNIT/ML injection vial  Patient-reported dosage and instructions? 100 unit/ML  How many days do you have left? 0  [facility]erred [facility]acy? [facility] #[id number]  Pharmacy phone number (if available)? [phone]  Additional Information for Provider? Patient doesn't have none left and   would like to get a refill as soon as possible.  ---------------------------------------------------------------------------  --------------  CALL BACK INFO  What is the best way for the office to contact you? OK to leave message on   voicemail  Preferred Call Back Phone Number?  [phone]

## 2021-11-09 NOTE — TELEPHONE ENCOUNTER
Medication:   Requested Prescriptions     Pending Prescriptions Disp Refills    insulin lispro (HUMALOG) 100 UNIT/ML injection vial 10 mL 5       Last Filled:  08/31/21    Patient Phone Number: [phone] (home)     Last appt: 7/20/2021   Next appt: Visit date not found    Last Labs DM:   Lab Results   Component Value Date    LABA1C 7.9 07/20/2021

## 2022-01-17 DIAGNOSIS — E11.59 HYPERTENSION ASSOCIATED WITH DIABETES (HCC): ICD-10-CM

## 2022-01-17 DIAGNOSIS — E10.29 TYPE 1 DIABETES MELLITUS WITH MICROALBUMINURIA (HCC): ICD-10-CM

## 2022-01-17 DIAGNOSIS — I15.2 HYPERTENSION ASSOCIATED WITH DIABETES (HCC): ICD-10-CM

## 2022-01-17 DIAGNOSIS — R80.9 TYPE 1 DIABETES MELLITUS WITH MICROALBUMINURIA (HCC): ICD-10-CM

## 2022-01-17 RX ORDER — LISINOPRIL 10 MG/1
10 TABLET ORAL DAILY
Qty: 90 TABLET | Refills: 1 | Status: SHIPPED | OUTPATIENT
Start: 2022-01-17 | End: 2022-03-10 | Stop reason: SDUPTHER

## 2022-01-17 NOTE — TELEPHONE ENCOUNTER
Medication:   Requested Prescriptions     Pending Prescriptions Disp Refills    lisinopril (PRINIVIL;ZESTRIL) 10 MG tablet [Pharmacy Med Name: LISINOPRIL 10MG TABLETS] 90 tablet 1     Sig: TAKE 1 TABLET BY MOUTH DAILY        Last Filled:  11/9/21    Patient Phone Number: [phone] (home)     Last appt: 7/20/2021 Return in about 3 months (around 10/20/2021) for Diabetes. Next appt: Visit date not found    Last OARRS: No flowsheet data found.

## 2022-01-21 ENCOUNTER — TELEPHONE (OUTPATIENT)
Dept: PRIMARY CARE CLINIC | Age: 37
End: 2022-01-21

## 2022-01-21 NOTE — TELEPHONE ENCOUNTER
Equipment Request     Pt is requesting a glucometer     [facility] #[id number] Iftikhar Tam, [address] [place] [address] -  [phone]

## 2022-01-24 DIAGNOSIS — E78.5 TYPE 1 DIABETES MELLITUS WITH HYPERLIPIDEMIA (HCC): Primary | ICD-10-CM

## 2022-01-24 DIAGNOSIS — E10.69 TYPE 1 DIABETES MELLITUS WITH HYPERLIPIDEMIA (HCC): Primary | ICD-10-CM

## 2022-01-24 RX ORDER — BLOOD-GLUCOSE METER
1 KIT MISCELLANEOUS
Qty: 1 KIT | Refills: 0 | Status: SHIPPED | OUTPATIENT
Start: 2022-01-24 | End: 2022-04-04

## 2022-02-02 DIAGNOSIS — R80.9 TYPE 1 DIABETES MELLITUS WITH MICROALBUMINURIA (HCC): Primary | ICD-10-CM

## 2022-02-02 DIAGNOSIS — E10.29 TYPE 1 DIABETES MELLITUS WITH MICROALBUMINURIA (HCC): Primary | ICD-10-CM

## 2022-02-02 RX ORDER — LANCETS 30 GAUGE
1 EACH MISCELLANEOUS 2 TIMES DAILY
Qty: 100 EACH | Refills: 5 | Status: SHIPPED | OUTPATIENT
Start: 2022-02-02

## 2022-02-10 DIAGNOSIS — E10.29 TYPE 1 DIABETES MELLITUS WITH MICROALBUMINURIA (HCC): ICD-10-CM

## 2022-02-10 DIAGNOSIS — R80.9 TYPE 1 DIABETES MELLITUS WITH MICROALBUMINURIA (HCC): ICD-10-CM

## 2022-02-10 RX ORDER — INSULIN GLARGINE 100 [IU]/ML
INJECTION, SOLUTION SUBCUTANEOUS
Qty: 15 ML | Refills: 5 | Status: SHIPPED | OUTPATIENT
Start: 2022-02-10 | End: 2022-03-10 | Stop reason: SDUPTHER

## 2022-02-10 NOTE — TELEPHONE ENCOUNTER
Medication:   Requested Prescriptions     Pending Prescriptions Disp Refills    LANTUS SOLOSTAR 100 UNIT/ML injection pen [Pharmacy Med Name: LANTUS SOLOSTAR PEN INJ 3ML] 15 mL 5     Sig: ADMINISTER 70 UNITS UNDER THE SKIN TWICE DAILY       Last Filled:      Patient Phone Number: [phone] (home)     Last appt: 7/20/2021 Return in about 3 months (around 10/20/2021) for Diabetes.     Next appt: Visit date not found    Last Labs DM:   Lab Results   Component Value Date    LABA1C 7.9 07/20/2021

## 2022-03-09 NOTE — PATIENT INSTRUCTIONS
Patient Education        Well Visit, Ages 25 to 48: Care Instructions  Overview     Well visits can help you stay healthy. Your doctor has checked your overall health and may have suggested ways to take good care of yourself. Your doctor also may have recommended tests. At home, you can help prevent illness with healthy eating, regular exercise, and other steps. Follow-up care is a key part of your treatment and safety. Be sure to make and go to all appointments, and call your doctor if you are having problems. It's also a good idea to know your test results and keep a list of the medicines you take. How can you care for yourself at home? · Get screening tests that you and your doctor decide on. Screening helps find diseases before any symptoms appear. · Eat healthy foods. Choose fruits, vegetables, whole grains, protein, and low-fat dairy foods. Limit fat, especially saturated fat. Reduce salt in your diet. · Limit alcohol. If you are a man, have no more than 2 drinks a day or 14 drinks a week. If you are a woman, have no more than 1 drink a day or 7 drinks a week. · Get at least 30 minutes of physical activity on most days of the week. Walking is a good choice. You also may want to do other activities, such as running, swimming, cycling, or playing tennis or team sports. Discuss any changes in your exercise program with your doctor. · Reach and stay at a healthy weight. This will lower your risk for many problems, such as obesity, diabetes, heart disease, and high blood pressure. · Do not smoke or allow others to smoke around you. If you need help quitting, talk to your doctor about stop-smoking programs and medicines. These can increase your chances of quitting for good. · Care for your mental health. It is easy to get weighed down by worry and stress. Learn strategies to manage stress, like deep breathing and mindfulness, and stay connected with your family and community.  If you find you often feel sad or hopeless, talk with your doctor. Treatment can help. · Talk to your doctor about whether you have any risk factors for sexually transmitted infections (STIs). You can help prevent STIs if you wait to have sex with a new partner (or partners) until you've each been tested for STIs. It also helps if you use condoms (male or female condoms) and if you limit your sex partners to one person who only has sex with you. Vaccines are available for some STIs, such as HPV. · Use birth control if it's important to you to prevent pregnancy. Talk with your doctor about the choices available and what might be best for you. · If you think you may have a problem with alcohol or drug use, talk to your doctor. This includes prescription medicines (such as amphetamines and opioids) and illegal drugs (such as cocaine and methamphetamine). Your doctor can help you figure out what type of treatment is best for you. · Protect your skin from too much sun. When you're outdoors from 10 a.m. to 4 p.m., stay in the shade or cover up with clothing and a hat with a wide brim. Wear sunglasses that block UV rays. Even when it's cloudy, put broad-spectrum sunscreen (SPF 30 or higher) on any exposed skin. · See a dentist one or two times a year for checkups and to have your teeth cleaned. · Wear a seat belt in the car. When should you call for help? Watch closely for changes in your health, and be sure to contact your doctor if you have any problems or symptoms that concern you. Where can you learn more? Go to [email or web address] org and sign in to your [facility]. account. Enter P072 in the [employer] box to learn more about \"Well Visit, Ages 25 to 48: Care Instructions. \"     If you do not have an account, please click on the \"Sign Up Now\" link. Current as of: October 6, 2021               Content Version: 13.1  © 8709-1723 Healthwise, [employer]orporated. Care instructions adapted under license by [facility] ([facility]). If you have questions about a medical condition or this instruction, always ask your healthcare professional. [place] [address] any warranty or liability for your use of this information.

## 2022-03-09 NOTE — PROGRESS NOTES
MG tablet Take 1 tablet by mouth daily Yes Kristin Fat, DO   insulin glargine (LANTUS SOLOSTAR) 100 UNIT/ML injection pen ADMINISTER 70 UNITS UNDER THE SKIN TWICE DAILY Yes Tinnie Fat, DO   Lancets MISC 1 each by Does not apply route 2 times daily Yes Tinnie Fat, DO   blood glucose test strips (ASCENSIA AUTODISC VI;ONE TOUCH ULTRA TEST VI) strip 1 each by In Vitro route daily Test as directed,Dispense according to insurance formulary Yes Tinnrich Fat, DO   Blood Glucose Monitoring Suppl (ONE TOUCH ULTRA MINI) w/Device KIT 1 kit by Does not apply route 4 times daily (after meals and at bedtime) Yes Tinnie Fat, DO   Continuous Blood Gluc Sensor (FREESTYLE NARDA 14 DAY SENSOR) MISC 1 each by Does not apply route every 14 days Yes Kristin Fat, DO   Insulin Syringe-Needle U-100 (Betito Flynnurry INS SYR .3CC/29G) 29G X 1/2\" 0.3 ML MISC 1 each by Does not apply route daily Yes Tinnrich Fat, DO        No Known Allergies    Past Medical History:   Diagnosis Date    Diabetes mellitus ([facility] [address]        Past Surgical History:   Procedure Laterality Date     SECTION      HAND SURGERY Bilateral        Social History     Socioeconomic History    Marital status: Single     Spouse name: Not on file    Number of children: Not on file    Years of education: Not on file    Highest education level: Not on file   Occupational History    Not on file   Tobacco Use    Smoking status: Never Smoker    Smokeless tobacco: Never Used   Vaping Use    Vaping Use: Never used   Substance and Sexual Activity    Alcohol use: No    Drug use: No    Sexual activity: Yes   Other Topics Concern    Not on file   Social History Narrative    Not on file     Social Determinants of Health     Financial Resource Strain:     Difficulty of Paying Living Expenses: Not on file   Food Insecurity:     Worried About Running Out of Food in the Last Year: Not on file    Michael of Food in the Last Year: Not on file   Transportation Needs:     Lack of Transportation (Medical): Not on file    Lack of Transportation (Non-Medical): Not on file   Physical Activity:     Days of Exercise per Week: Not on file    Minutes of Exercise per Session: Not on file   Stress:     Feeling of Stress : Not on file   Social Connections:     Frequency of Communication with Friends and Family: Not on file    Frequency of Social Gatherings with Friends and Family: Not on file    Attends [employer] Services: Not on file    Active Member of [address] [place] [address] or Organizations: Not on file    Attends Club or Organization Meetings: Not on file    Marital Status: Not on file   Intimate Partner Violence:     Fear of Current or Ex-Partner: Not on file    Emotionally Abused: Not on file    Physically Abused: Not on file    Sexually Abused: Not on file   Housing Stability:     Unable to Pay for Housing in the Last Year: Not on file    Number of [place] in the Last Year: Not on file    Unstable Housing in the Last Year: Not on file        History reviewed. No pertinent family history. Vitals:    03/10/22 0754   BP: 121/61   Site: Right Upper Arm   Position: Sitting   Cuff Size: Large Adult   Pulse: 68   Temp: 97.5 °F (36.4 °C)   TempSrc: Temporal   Weight: 271 lb (122.9 kg)   Height: 5' 5\" (1.651 m)     Estimated body mass index is 45.1 kg/m² as calculated from the following:    Height as of this encounter: 5' 5\" (1.651 m). Weight as of this encounter: 271 lb (122.9 kg). Physical Exam  Vitals reviewed. Constitutional:       Appearance: Normal appearance. She is obese. HENT:      Head: Normocephalic and atraumatic. Right Ear: Tympanic membrane, ear canal and external ear normal.      Left Ear: Tympanic membrane, ear canal and external ear normal.      Nose: Nose normal.   Eyes:      Extraocular Movements: Extraocular movements intact. Conjunctiva/sclera: Conjunctivae normal.   Cardiovascular:      Rate and Rhythm: Normal rate and regular rhythm.       Pulses: Normal pulses. Heart sounds: Normal heart sounds. Pulmonary:      Effort: Pulmonary effort is normal.      Breath sounds: Normal breath sounds. Abdominal:      General: Abdomen is flat. Bowel sounds are normal.      Palpations: Abdomen is soft. Musculoskeletal:         General: Normal range of motion. Cervical back: Normal range of motion and neck supple. Skin:     General: Skin is warm and dry. Capillary Refill: Capillary refill takes less than 2 seconds. Findings: No lesion or rash. Neurological:      General: No focal deficit present. Mental Status: She is alert and oriented to person, place, and time. Mental status is at baseline. Sensory: No sensory deficit. Psychiatric:         Mood and Affect: Mood normal.         Behavior: Behavior normal.         Thought Content: Thought content normal.         Judgment: Judgment normal.       Separate Identifiable issues addressed today:  Diabetes Mellitus Type 2: Current symptoms/problems include none. Medication compliance:  compliant most of the time  Diabetic diet compliance:  has been doing much better, does get free pizza from her job at [employer], which causes some issues. ,  Weight trend: decreasing  Current exercise: no regular exercise  Barriers: financial and time constraints    Home blood sugar records: fasting range: 160-220, postprandial range: 140-160  Any episodes of hypoglycemia? no  Eye exam current (within one year): no   reports that she has never smoked. She has never used smokeless tobacco.   Daily Aspirin?  No    Lab Results   Component Value Date    LABA1C 7.7 03/10/2022    LABA1C 7.9 07/20/2021    LABA1C 8.6 01/14/2021     Lab Results   Component Value Date    LABMICR 17.30 (H) 01/14/2021    CREATININE <0.5 (L) 01/14/2021     Lab Results   Component Value Date    ALT 22 01/14/2021    AST 19 01/14/2021     Lab Results   Component Value Date    HDL 36 (L) 01/14/2021    LDLCALC 118 (H) 01/14/2021        Hypertension: Patient is here for follow-up of elevated blood pressure. She is not exercising and is not adherent to a low-salt diet. Blood pressure is well controlled at home. Cardiac symptoms: none. Patient denies chest pain, exertional chest pressure/discomfort, fatigue, irregular heart beat, lower extremity edema, near-syncope, syncope and tachypnea. Cardiovascular risk factors: diabetes mellitus, dyslipidemia, hypertension, obesity (BMI >= 30 kg/m2) and sedentary lifestyle. Use of agents associated with hypertension: none. History of target organ damage: none. ASSESSMENT/PLAN:  Jovan Blandon was seen today for annual exam and diabetes. Diagnoses and all orders for this visit:    Routine general medical examination at a health care facility: Vitals reviewed and within normal limits. BMI is obese. Depression screen negative. Reviewed diet exercise regimen patient regimen appropriate lifestyle modifications. Type 1 diabetes mellitus with microalbuminuria ([facility] [address]): A1c 7.7. Referring to endocrinology. Defer adjustments today. Updating diabetic labs as below. Foot exam performed and normal.  Follow-up 3 months. -     Comprehensive Metabolic Panel; Future  -     Microalbumin / Creatinine Urine Ratio; Future  -     Diabetic Foot Exam  -     POCT glycosylated hemoglobin (Hb A1C)  -     [facility] Endocrine & Diabetes  -     insulin lispro (HUMALOG) 100 UNIT/ML injection vial; ADMINISTER 15 UNITS UNDER THE SKIN THREE TIMES DAILY BEFORE MEALS  -     lisinopril (PRINIVIL;ZESTRIL) 10 MG tablet; Take 1 tablet by mouth daily  -     insulin glargine (LANTUS SOLOSTAR) 100 UNIT/ML injection pen; ADMINISTER 70 UNITS UNDER THE SKIN TWICE DAILY    Hypertension associated with diabetes ([facility] [address]): Blood pressure goal today. Tolerating current regimen well without side effects. Refills given. -     lisinopril (PRINIVIL;ZESTRIL) 10 MG tablet;  Take 1 tablet by mouth daily    Type 1 diabetes mellitus with hyperlipidemia [facility]): Given information on the Mediterranean diet. Updating lipid profile today. -     Lipid, Fasting; Future    Type 1 diabetes mellitus with obesity [facility]): Complicates all aspects of patient's care. Rheumatoid arthritis involving multiple sites with positive rheumatoid factor [facility]): Joint pain primarily in her fingers. Had been followed by rheumatology, but lost to follow-up due to insurance change. Will get her set up with [id number] [facility] rheumatology. -     Yana Robin MD, Rheumatology, [facility]    Screening for cervical cancer  -     [facility] - Tiffanie Guerra MD, Obstetrics, [facility]    Need for prophylactic vaccination against Streptococcus pneumoniae (pneumococcus): Declined by patient        Return in about 3 months (around 6/10/2022) for Diabetes. An  electronic signature was used to authenticate this note.     --Behzad Mccain DO on 3/10/2022 at 8:30 AM

## 2022-03-10 ENCOUNTER — OFFICE VISIT (OUTPATIENT)
Dept: PRIMARY CARE CLINIC | Age: 37
End: 2022-03-10
Payer: COMMERCIAL

## 2022-03-10 VITALS
HEIGHT: 65 IN | SYSTOLIC BLOOD PRESSURE: 121 MMHG | WEIGHT: 271 LBS | HEART RATE: 68 BPM | DIASTOLIC BLOOD PRESSURE: 61 MMHG | BODY MASS INDEX: 45.15 KG/M2 | TEMPERATURE: 97.5 F

## 2022-03-10 DIAGNOSIS — I15.2 HYPERTENSION ASSOCIATED WITH DIABETES (HCC): ICD-10-CM

## 2022-03-10 DIAGNOSIS — E11.59 HYPERTENSION ASSOCIATED WITH DIABETES (HCC): ICD-10-CM

## 2022-03-10 DIAGNOSIS — Z12.4 SCREENING FOR CERVICAL CANCER: ICD-10-CM

## 2022-03-10 DIAGNOSIS — E66.9 TYPE 1 DIABETES MELLITUS WITH OBESITY (HCC): ICD-10-CM

## 2022-03-10 DIAGNOSIS — E10.69 TYPE 1 DIABETES MELLITUS WITH HYPERLIPIDEMIA (HCC): ICD-10-CM

## 2022-03-10 DIAGNOSIS — E78.5 TYPE 1 DIABETES MELLITUS WITH HYPERLIPIDEMIA (HCC): ICD-10-CM

## 2022-03-10 DIAGNOSIS — E10.69 TYPE 1 DIABETES MELLITUS WITH OBESITY (HCC): ICD-10-CM

## 2022-03-10 DIAGNOSIS — Z23 NEED FOR PROPHYLACTIC VACCINATION AGAINST STREPTOCOCCUS PNEUMONIAE (PNEUMOCOCCUS): ICD-10-CM

## 2022-03-10 DIAGNOSIS — R80.9 TYPE 1 DIABETES MELLITUS WITH MICROALBUMINURIA (HCC): ICD-10-CM

## 2022-03-10 DIAGNOSIS — Z00.00 ROUTINE GENERAL MEDICAL EXAMINATION AT A HEALTH CARE FACILITY: Primary | ICD-10-CM

## 2022-03-10 DIAGNOSIS — M05.79 RHEUMATOID ARTHRITIS INVOLVING MULTIPLE SITES WITH POSITIVE RHEUMATOID FACTOR (HCC): ICD-10-CM

## 2022-03-10 DIAGNOSIS — E10.29 TYPE 1 DIABETES MELLITUS WITH MICROALBUMINURIA (HCC): ICD-10-CM

## 2022-03-10 LAB
A/G RATIO: 1.3 (ref 1.1–2.2)
ALBUMIN SERPL-MCNC: 3.9 G/DL (ref 3.4–5)
ALP BLD-CCNC: 125 U/L (ref 40–129)
ALT SERPL-CCNC: 30 U/L (ref 10–40)
ANION GAP SERPL CALCULATED.3IONS-SCNC: 14 MMOL/L (ref 3–16)
AST SERPL-CCNC: 21 U/L (ref 15–37)
BILIRUB SERPL-MCNC: <0.2 MG/DL (ref 0–1)
BUN BLDV-MCNC: 12 MG/DL (ref 7–20)
CALCIUM SERPL-MCNC: 9 MG/DL (ref 8.3–10.6)
CHLORIDE BLD-SCNC: 101 MMOL/L (ref 99–110)
CHOLESTEROL, FASTING: 168 MG/DL (ref 0–199)
CO2: 21 MMOL/L (ref 21–32)
CREAT SERPL-MCNC: <0.5 MG/DL (ref 0.6–1.1)
CREATININE URINE: 49.9 MG/DL (ref 28–259)
GFR AFRICAN AMERICAN: >60
GFR NON-AFRICAN AMERICAN: >60
GLUCOSE BLD-MCNC: 260 MG/DL (ref 70–99)
HBA1C MFR BLD: 7.7 %
HDLC SERPL-MCNC: 39 MG/DL (ref 40–60)
LDL CHOLESTEROL CALCULATED: 111 MG/DL
MICROALBUMIN UR-MCNC: 8.3 MG/DL
MICROALBUMIN/CREAT UR-RTO: 166.3 MG/G (ref 0–30)
POTASSIUM SERPL-SCNC: 4.6 MMOL/L (ref 3.5–5.1)
SODIUM BLD-SCNC: 136 MMOL/L (ref 136–145)
TOTAL PROTEIN: 6.8 G/DL (ref 6.4–8.2)
TRIGLYCERIDE, FASTING: 90 MG/DL (ref 0–150)
VLDLC SERPL CALC-MCNC: 18 MG/DL

## 2022-03-10 PROCEDURE — 2022F DILAT RTA XM EVC RTNOPTHY: CPT | Performed by: STUDENT IN AN ORGANIZED HEALTH CARE EDUCATION/TRAINING PROGRAM

## 2022-03-10 PROCEDURE — G8417 CALC BMI ABV UP PARAM F/U: HCPCS | Performed by: STUDENT IN AN ORGANIZED HEALTH CARE EDUCATION/TRAINING PROGRAM

## 2022-03-10 PROCEDURE — 99395 PREV VISIT EST AGE 18-39: CPT | Performed by: STUDENT IN AN ORGANIZED HEALTH CARE EDUCATION/TRAINING PROGRAM

## 2022-03-10 PROCEDURE — 99214 OFFICE O/P EST MOD 30 MIN: CPT | Performed by: STUDENT IN AN ORGANIZED HEALTH CARE EDUCATION/TRAINING PROGRAM

## 2022-03-10 PROCEDURE — G8484 FLU IMMUNIZE NO ADMIN: HCPCS | Performed by: STUDENT IN AN ORGANIZED HEALTH CARE EDUCATION/TRAINING PROGRAM

## 2022-03-10 PROCEDURE — 1036F TOBACCO NON-USER: CPT | Performed by: STUDENT IN AN ORGANIZED HEALTH CARE EDUCATION/TRAINING PROGRAM

## 2022-03-10 PROCEDURE — 83036 HEMOGLOBIN GLYCOSYLATED A1C: CPT | Performed by: STUDENT IN AN ORGANIZED HEALTH CARE EDUCATION/TRAINING PROGRAM

## 2022-03-10 PROCEDURE — 3051F HG A1C>EQUAL 7.0%<8.0%: CPT | Performed by: STUDENT IN AN ORGANIZED HEALTH CARE EDUCATION/TRAINING PROGRAM

## 2022-03-10 PROCEDURE — G8427 DOCREV CUR MEDS BY ELIG CLIN: HCPCS | Performed by: STUDENT IN AN ORGANIZED HEALTH CARE EDUCATION/TRAINING PROGRAM

## 2022-03-10 RX ORDER — INSULIN GLARGINE 100 [IU]/ML
INJECTION, SOLUTION SUBCUTANEOUS
Qty: 15 ML | Refills: 5 | Status: SHIPPED | OUTPATIENT
Start: 2022-03-10 | End: 2022-04-04 | Stop reason: SDUPTHER

## 2022-03-10 RX ORDER — LISINOPRIL 10 MG/1
10 TABLET ORAL DAILY
Qty: 90 TABLET | Refills: 1 | Status: SHIPPED | OUTPATIENT
Start: 2022-03-10 | End: 2022-08-22

## 2022-03-10 ASSESSMENT — ENCOUNTER SYMPTOMS
STRIDOR: 0
ABDOMINAL PAIN: 0
SHORTNESS OF BREATH: 0
BLOOD IN STOOL: 0
DIARRHEA: 0
CHEST TIGHTNESS: 0
ABDOMINAL DISTENTION: 0
BACK PAIN: 0
NAUSEA: 0
CONSTIPATION: 0

## 2022-03-10 ASSESSMENT — PATIENT HEALTH QUESTIONNAIRE - PHQ9
SUM OF ALL RESPONSES TO PHQ QUESTIONS 1-9: 0
SUM OF ALL RESPONSES TO PHQ QUESTIONS 1-9: 0
2. FEELING DOWN, DEPRESSED OR HOPELESS: 0
SUM OF ALL RESPONSES TO PHQ QUESTIONS 1-9: 0
1. LITTLE INTEREST OR PLEASURE IN DOING THINGS: 0
SUM OF ALL RESPONSES TO PHQ QUESTIONS 1-9: 0
SUM OF ALL RESPONSES TO PHQ9 QUESTIONS 1 & 2: 0

## 2022-03-23 ENCOUNTER — OFFICE VISIT (OUTPATIENT)
Dept: RHEUMATOLOGY | Age: 37
End: 2022-03-23
Payer: COMMERCIAL

## 2022-03-23 VITALS
WEIGHT: 271 LBS | HEIGHT: 65 IN | DIASTOLIC BLOOD PRESSURE: 74 MMHG | SYSTOLIC BLOOD PRESSURE: 118 MMHG | BODY MASS INDEX: 45.15 KG/M2

## 2022-03-23 DIAGNOSIS — Z79.899 HIGH RISK MEDICATION USE: ICD-10-CM

## 2022-03-23 DIAGNOSIS — M05.79 RHEUMATOID ARTHRITIS INVOLVING MULTIPLE SITES WITH POSITIVE RHEUMATOID FACTOR (HCC): Primary | ICD-10-CM

## 2022-03-23 PROCEDURE — G8484 FLU IMMUNIZE NO ADMIN: HCPCS | Performed by: INTERNAL MEDICINE

## 2022-03-23 PROCEDURE — G8417 CALC BMI ABV UP PARAM F/U: HCPCS | Performed by: INTERNAL MEDICINE

## 2022-03-23 PROCEDURE — G8427 DOCREV CUR MEDS BY ELIG CLIN: HCPCS | Performed by: INTERNAL MEDICINE

## 2022-03-23 PROCEDURE — 99245 OFF/OP CONSLTJ NEW/EST HI 55: CPT | Performed by: INTERNAL MEDICINE

## 2022-03-23 RX ORDER — PREDNISONE 10 MG/1
TABLET ORAL
Qty: 35 TABLET | Refills: 0 | Status: SHIPPED | OUTPATIENT
Start: 2022-03-23 | End: 2022-07-14 | Stop reason: SDUPTHER

## 2022-03-23 RX ORDER — FOLIC ACID 1 MG/1
TABLET ORAL
Qty: 90 TABLET | Refills: 3 | Status: SHIPPED | OUTPATIENT
Start: 2022-03-23 | End: 2022-07-14 | Stop reason: SDUPTHER

## 2022-03-23 NOTE — PROGRESS NOTES
bilaterally. Both wrists are tender to palpation and swollen dorsally. Elbows are tender in joint line. Subjective stiffness in both shoulders but has full range of motion. .  All other joints in upper and lower extremities are nontender, no swelling or synovitis. Skin: No rashes, no induration or skin thickening or nodules. DATA:       ASSESSMENT AND PLAN:  Mary Ferraro was seen today for establish care and joint pain. Diagnoses and all orders for this visit:    Rheumatoid arthritis involving multiple sites with positive rheumatoid factor (HCC)  -     XR HAND LEFT (MIN 3 VIEWS); Future  -     XR HAND RIGHT (MIN 3 VIEWS); Future  -     CBC with Auto Differential; Future  -     C-Reactive Protein; Future  -     Sedimentation Rate; Future  -     Hepatitis B Surface Antigen; Future  -     Hepatitis B Core Antibody, IgM; Future  -     Hepatitis B Surface Antibody; Future  -     Quantiferon, Incubated; Future  -     Cyclic Citrul Peptide Antibody, IgG; Future  -     Rheumatoid Factor; Future    High risk medication use    Other orders  -     folic acid (FOLVITE) 1 MG tablet; Take 1 tab po daily. -     predniSONE (DELTASONE) 10 MG tablet; 2 tab po daily x 7 days. 1.5 tab po daily x 7 days. 1 tab po daily thereafter  -     methotrexate (RHEUMATREX) 2.5 MG chemo tablet; Take 7 Tabs po once a week, same day every week. Seropositive rheumatoid arthritis-positive RF, negative CCP-onset 2021-high disease activity-joint threatening disease. Other medical comorbidity-insulin-dependent diabetes. Plan-  Diagnosis, management options, prognosis, need to control rheumatoid arthritis tightly is explained in order to prevent joint damage and deformities. Went over various treatment options for inflammatory arthritis including non-biologic and biologic disease modifiers. Recommend Methotrexate as a first line of agent.  Risk, benefits and side effects fully explained including but not limited the immune suppression,

## 2022-03-23 NOTE — PATIENT INSTRUCTIONS
RHEUMATOID ARTHRITIS People have long feared rheumatoid arthritis (commonly called RA) as one of the most disabling types of arthritis. The good news is that the outlook has greatly improved for many people with newly diagnosed (detected) RA. Of course, RA remains a serious disease, and one that can vary widely in symptoms (what you feel) and outcomes. Even so, treatment advances have made it possible to stop or at least slow the progression (worsening) of joint damage. Rheumatologists now have many new treatments that target the inflammation that RA causes. They also understand better when and how to use treatments to get the best effects. Fast facts  RA is an autoimmune disease. A faulty immune system (the bodys defense system) triggers it. RA is the most common type of autoimmune arthritis. At least 1.3 million [place] adults have RA. Treatments have improved greatly and help many of those affected. Rheumatologists are doctors who have the expertise to correctly diagnose this disease and to offer patients the most advanced treatments. What is rheumatoid arthritis? RA is a chronic (long-term) disease that causes pain, stiffness, swelling and limited motion and function of many joints. While RA can affect any joint, the small joints in the hands and feet tend to be involved most often. Inflammation sometimes can affect organs as well, for instance, the eyes or lungs. The stiffness seen in active RA is most often worst in the morning. It may last one to two hours (or even the whole day). Stiffness for a long time in the morning is a clue that you may have RA, since few other arthritic diseases behave this way. For instance, osteoarthritis most often does not cause prolonged morning stiffness. Other signs and symptoms that can occur in RA include:   Loss of energy   Low fevers   Loss of appetite   Dry eyes and mouth from a related health problem, Sjogren's syndrome   Firm lumps, called rheumatoid nodules, which grow beneath the skin in places such as the elbow and hands  The normal joint structure appears on the left. On the right is the joint with rheumatoid arthritis. RA causes synovitis, pain and swelling of the synovium (the tissue that lines the joint). This can make cartilage (the tissue that cushions between joints) and bone erode, or wear away. What causes rheumatoid arthritis? RA is an autoimmune disease. This means that certain cells of the immune system do not work properly and start attacking healthy tissues -- the joints in RA. The cause of RA is not known. Yet, new research is giving us a better idea of what makes the immune system attack the body and create inflammation. In RA, the focus of the inflammation is in the synovium, the tissue that lines the joint. Immune cells release inflammation-causing chemicals. These chemicals can damage cartilage (the tissue that cushions between joints) and bone. Other things likely play a role in RA as well. For instance, genes that affect the immune system may make some people more prone to getting RA. Who gets rheumatoid arthritis? RA is the most common form of autoimmune arthritis, affecting more than 1.3 million Americans. Of these, about 75% are women. In fact, 1-3% of women may get rheumatoid arthritis in their lifetime. The disease most often begins between the fourth and sixth decades of life. However, RA can start at any age. How is rheumatoid arthritis diagnosed? RA can be hard to detect because it may begin with subtle symptoms, such as achy joints or a little stiffness in the morning. Also, many diseases behave like RA early on. For this reason, if you or your primary care physician thinks you have RA, you should see a rheumatologist. A rheumatologist is a physician with the skill and knowledge to reach a correct diagnosis of RA and to make the most suitable treatment plan.   Diagnosis of RA depends on the symptoms and results of a physical exam, such as warmth, swelling and pain in the joints. Some blood tests also can help confirm RA. Telltale signs include: Anemia (a low red blood cell count)   Rheumatoid factor (an antibody, or blood protein, found in about 80% of patients with RA in time, but in as few as 30% at the start of arthritis)   Antibodies to cyclic citrullinated peptides (pieces of proteins), or anti-CCP for short (found in 60-70% of patients with RA)   Elevated erythrocyte sedimentation rate (a blood test that, in most patients with RA, confirms the amount of inflammation in the joints)  X-rays can help in detecting RA, but may not show anything abnormal in early arthritis. Even so, these first X-rays may be useful later to show if the disease is progressing. Often, MRI and ultrasound scanning are done to help  the severity of RA. There is no single test that confirms an RA diagnosis for most patients with this disease. (This is above all true for patients who have had symptoms fewer than six months.) Rather, a doctor makes the diagnosis by looking at the symptoms and results from the physical exam, lab tests and X-rays. How is rheumatoid arthritis treated? Therapy for RA has improved greatly in the past 30 years. Current treatments give most patients good or excellent relief of symptoms and let them keep functioning at, or near, normal levels. With the right medications, many patients can achieve remission -- that is, have no signs of active disease. There is no cure for RA. The goal of treatment is to lessen your symptoms and poor function. Doctors do this by starting proper medical therapy as soon as possible, before your joints have lasting damage. No single treatment works for all patients. Many people with RA must change their treatment at least once during their lifetime. Good control of RA requires early diagnosis and, at times, aggressive treatment.  Thus, patients with a diagnosis of RA should begin their treatment with disease-modifying antirheumatic drugs -- referred to as DMARDs. These drugs not only relieve symptoms but also slow progression of the disease. Often, doctors prescribe DMARDs along with nonsteroidal anti-inflammatory drugs or NSAIDs and/or low-dose corticosteroids, to lower swelling, pain and fever. DMARDs have greatly improved the symptoms, function and quality of life for nearly all patients with RA. Ask your rheumatologist about the need for DMARD therapy and the risks and benefits of these drugs. Common DMARDs include methotrexate (brand names include Rheumatrex® and Folex®), leflunomide ([address]), hydroxychloroquine (Plaquenil) and sulfasalazine (Azulfidine). Older DMARDs include gold, given as a pill -- auranofin (Ridaura) -- or more often as an injection into a muscle (such as Myochrysine). The antibiotic minocycline (e.g., Minocin, Dynacin and Vectrin) also is a DMARD, as are the immune suppressants azathioprine (Imuran) and cyclosporine (Sandimmune and Neoral). These three drugs and gold are rarely prescribed for RA these days because other drugs work better or have fewer side effects. Patients with more serious disease may need medications called biologic response modifiers or biologic agents.  They can target the parts of the immune system and the signals that lead to inflammation and joint and tissue damage. These medications are also DMARDs. FDA-approved drugs of this type include abatacept (Orencia), adalimumab (Humira), anakinra (Kineret), certolizumab (Cimzia), etanercept (Enbrel), golimumab (Simponi) infliximab (Remicade), rituximab (Rituxan) and tocilizumab (Actemra). Most often, patients take these drugs with methotrexate, as the mix of medicines is more helpful. The best treatment of RA needs more than medicines alone. Patient education, such as how to cope with RA, also is important.  Proper care requires the expertise of a team of providers, including rheumatologists, primary care physicians, and physical and occupational therapists. You will need frequent visits through the year with your rheumatologist. These checkups let your doctor track the course of your disease and check for any side effects of your medications. You likely also will need to repeat blood tests and X-rays or ultrasounds from time to time. What is the broader health impact of rheumatoid arthritis? Research shows that people with RA, mainly those whose disease is not well controlled, have a higher risk for heart disease and stroke. Talk with your doctor about these risks and ways to lower them. Living with rheumatoid arthritis   It is important to be physically active most of the time, but to sometimes scale back activities when the disease flares. In general, rest is helpful when a joint is inflamed, or when you feel tired. At these times, do gentle range-of-motion exercises, such as stretching. This will keep the joint flexible. When you feel better, do low-impact aerobic exercises, such as walking, and exercises to boost muscle strength. This will improve your overall health and reduce pressure on your joints. A physical or occupational therapist can help you find which types of activities are best for you, and at what level or pace you should do them. Finding that you have a chronic illness is a life-changing event. It can cause worry and sometimes feelings of isolation or depression. Thanks to greatly improved treatments, these feelings tend to decrease with time as energy improves, and pain and stiffness decrease. Discuss these normal feelings with your health care providers. They can provide helpful information and resources. Rheumatoid arthritis affects the wrist and the small joints of the hand, including the knuckles and the middle joints of the fingers. Points to remember  Newer treatments are effective. RA drugs have greatly improved outcomes for patients.  For most people with RA, early treatment can control joint pain and swelling, and lessen joint damage. Seek an expert in arthritis: a rheumatologist. Expertise is vital to make an early diagnosis of RA and to rule out diseases that mimic RA, thus avoiding unneeded tests and treatments. A doctor who is an expert in RA also can design a customized treatment plan that is best suited for you. Therefore, the rheumatologist, working with the primary care physician and other health care providers, should supervise the treatment of the patient with RA. Start treatment early. Studies show that people who receive early treatment for RA feel better sooner and more often, and are more likely to lead an active life. They also are less likely to have the type of joint damage that leads to joint replacement. The rheumatologist's role in the treatment of rheumatoid arthritis   RA is a complex disease, but many advances in treatment have occurred recently. Rheumatologists are doctors who are experts in diagnosing and treating arthritis and other diseases of the joints, muscles and bones. Thus, they are best qualified to make a proper diagnosis of RA. They can also advise patients about the best treatment options. To find a rheumatologist  For a list of rheumatologists in your area, click here. Learn more about rheumatologists and rheumatology health professionals. For more information  The [employer] has compiled this list to give you a starting point for your own additional research. The [employer] does not endorse or maintain these web sites, and is notresponsible for any information or claims provided on them. It is always best to talk with your rheumatologist for more information and before making any decisions about your care. Rheumatology [address] advances research and training to improve the health of people with rheumatic diseases. www. rheumatology. org/REF  The [employer]  www. arthritis. [employer] of Arthritis and Musculoskeletal and [address]  www.niams. nih.gov   Updated August 2012  Written by Dalia Leija MD, and Vicky Ha MD, and reviewed by the [employer] Communications and Marketing Committee. This patient fact sheet is provided for general education only. Individuals should consult a qualified health care provider for professional medical advice, diagnosis and treatment of a medical or health condition.   [address] [facility]

## 2022-04-04 ENCOUNTER — OFFICE VISIT (OUTPATIENT)
Dept: ENDOCRINOLOGY | Age: 37
End: 2022-04-04
Payer: COMMERCIAL

## 2022-04-04 VITALS
BODY MASS INDEX: 44.98 KG/M2 | OXYGEN SATURATION: 98 % | HEIGHT: 65 IN | WEIGHT: 270 LBS | SYSTOLIC BLOOD PRESSURE: 128 MMHG | HEART RATE: 84 BPM | DIASTOLIC BLOOD PRESSURE: 81 MMHG | TEMPERATURE: 98.1 F

## 2022-04-04 DIAGNOSIS — E78.5 DYSLIPIDEMIA DUE TO TYPE 1 DIABETES MELLITUS (HCC): ICD-10-CM

## 2022-04-04 DIAGNOSIS — E10.29 TYPE 1 DIABETES MELLITUS WITH MICROALBUMINURIA (HCC): ICD-10-CM

## 2022-04-04 DIAGNOSIS — E10.69 TYPE 1 DIABETES MELLITUS WITH OBESITY (HCC): Primary | ICD-10-CM

## 2022-04-04 DIAGNOSIS — R80.9 TYPE 1 DIABETES MELLITUS WITH MICROALBUMINURIA (HCC): ICD-10-CM

## 2022-04-04 DIAGNOSIS — E66.9 TYPE 1 DIABETES MELLITUS WITH OBESITY (HCC): Primary | ICD-10-CM

## 2022-04-04 DIAGNOSIS — E10.69 DYSLIPIDEMIA DUE TO TYPE 1 DIABETES MELLITUS (HCC): ICD-10-CM

## 2022-04-04 PROCEDURE — G8427 DOCREV CUR MEDS BY ELIG CLIN: HCPCS | Performed by: INTERNAL MEDICINE

## 2022-04-04 PROCEDURE — 99244 OFF/OP CNSLTJ NEW/EST MOD 40: CPT | Performed by: INTERNAL MEDICINE

## 2022-04-04 PROCEDURE — 2022F DILAT RTA XM EVC RTNOPTHY: CPT | Performed by: INTERNAL MEDICINE

## 2022-04-04 PROCEDURE — G8417 CALC BMI ABV UP PARAM F/U: HCPCS | Performed by: INTERNAL MEDICINE

## 2022-04-04 RX ORDER — IBUPROFEN 600 MG/1
TABLET ORAL
Qty: 1 KIT | Refills: 5 | Status: SHIPPED | OUTPATIENT
Start: 2022-04-04

## 2022-04-04 RX ORDER — BLOOD-GLUCOSE TRANSMITTER
EACH MISCELLANEOUS
Qty: 1 EACH | Refills: 3 | Status: SHIPPED | OUTPATIENT
Start: 2022-04-04

## 2022-04-04 RX ORDER — BLOOD-GLUCOSE,RECEIVER,CONT
EACH MISCELLANEOUS
Qty: 1 EACH | Refills: 0 | Status: SHIPPED | OUTPATIENT
Start: 2022-04-04

## 2022-04-04 RX ORDER — ROSUVASTATIN CALCIUM 10 MG/1
10 TABLET, COATED ORAL DAILY
Qty: 90 TABLET | Refills: 1 | Status: SHIPPED | OUTPATIENT
Start: 2022-04-04

## 2022-04-04 RX ORDER — INSULIN GLARGINE 100 [IU]/ML
INJECTION, SOLUTION SUBCUTANEOUS
Qty: 10 PEN | Refills: 5 | Status: SHIPPED | OUTPATIENT
Start: 2022-04-04 | End: 2022-05-16 | Stop reason: SDUPTHER

## 2022-04-04 RX ORDER — BLOOD-GLUCOSE SENSOR
EACH MISCELLANEOUS
Qty: 3 EACH | Refills: 5 | Status: SHIPPED | OUTPATIENT
Start: 2022-04-04 | End: 2022-10-17 | Stop reason: SDUPTHER

## 2022-04-04 RX ORDER — BLOOD-GLUCOSE METER
EACH MISCELLANEOUS
COMMUNITY

## 2022-04-04 RX ORDER — INSULIN LISPRO 100 [IU]/ML
30 INJECTION, SOLUTION INTRAVENOUS; SUBCUTANEOUS
Qty: 10 PEN | Refills: 1 | Status: SHIPPED | OUTPATIENT
Start: 2022-04-04 | End: 2022-06-03 | Stop reason: SDUPTHER

## 2022-04-04 NOTE — PATIENT INSTRUCTIONS
Change Lantus to 70 units in am   Change Humalog 25 units tidac, 15 units for sandwich or snacks     Correction scale                   With meals (during the day)      < 150 ---     0 units                                  151-200 --  3 units                                  201-250 :    6 units                                   251-300:     9 units                                   301-350:     12 units                                   >350 :         15 units

## 2022-04-04 NOTE — PROGRESS NOTES
Patient ID:   Angela Abarca is a 29 y.o. female    Chief Complaint:   Angela Abarca presents for an initial evaluation of Type 1 Diabetes Mellitus , Hyperlipidemia and hypertension. Referred by Dr. Roslyn Ruiz DO   Subjective:   Type 1 Diabetes Mellitus diagnosed at age 9   Previously seen Dr. Prashant Ying     She is on prednisone 10 mg daily for RA. will be on it for 4 weeks     Lantus 60 units a bedtime   Lispro 30 units for each meal    Works from [employer] to [employer] and then 8p to 1am. 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 Reviewed meter 's   AM:   Lunch:  Supper:   HS:     Hypoglycemias:     Meals: Three or four, dinner is bigger. No snacks. Drinks crystal light, diet soda. Exercise: None more than work     Denies chest pain, exertional dyspnea.      Family history of CAD: None   Denies smoking/alcohol    The following portions of the patient's history were reviewed and updated as appropriate:       Family History   Problem Relation Age of Onset    No Known Problems Mother     Pulmonary Embolism Father     No Known Problems Sister     No Known Problems Brother          Social History     Socioeconomic History    Marital status: Single     Spouse name: Not on file    Number of children: Not on file    Years of education: Not on file    Highest education level: Not on file   Occupational History    Not on file   Tobacco Use    Smoking status: Never Smoker    Smokeless tobacco: Never Used   Vaping Use    Vaping Use: Never used   Substance and Sexual Activity    Alcohol use: No    Drug use: No    Sexual activity: Yes   Other Topics Concern    Not on file   Social History Narrative    Not on file     Social Determinants of Health     Financial Resource Strain:     Difficulty of Paying Living Expenses: Not on file   Food Insecurity:     Worried About [address] in the Last Year: Not on file    [employer] in the Last Year: Not on file   Transportation Needs:     Lack of Transportation (Medical): Not on file    Lack of Transportation (Non-Medical): Not on file   Physical Activity:     Days of Exercise per Week: Not on file    Minutes of Exercise per Session: Not on file   Stress:     Feeling of Stress : Not on file   Social Connections:     Frequency of Communication with Friends and Family: Not on file    Frequency of Social Gatherings with Friends and Family: Not on file    Attends Synagogue Services: Not on file    Active Member of [address] [place] [address] [employer] or Organizations: Not on file    Attends Club or Organization Meetings: Not on file    Marital Status: Not on file   Intimate Partner Violence:     Fear of Current or Ex-Partner: Not on file    Emotionally Abused: Not on file    Physically Abused: Not on file    Sexually Abused: Not on file   Housing Stability:     Unable to Pay for Housing in the Last Year: Not on file    Number of [place] in the Last Year: Not on file    Unstable Housing in the Last Year: Not on file       Past Medical History:   Diagnosis Date    Diabetes mellitus ([facility] [address]        Past Surgical History:   Procedure Laterality Date     SECTION      HAND SURGERY Bilateral        No Known Allergies      Current Outpatient Medications:     Blood Glucose Monitoring Suppl (TRUE METRIX AIR GLUCOSE METER) CRYSTAL, by Does not apply route, Disp: , Rfl:     Glucose Blood (TRUE METRIX BLOOD GLUCOSE TEST VI), 3 each daily, Disp: , Rfl:     insulin lispro, 1 Unit Dial, (HUMALOG KWIKPEN) 100 UNIT/ML SOPN, Inject 30 Units into the skin 3 times daily (before meals), Disp: 10 pen, Rfl: 1    Continuous Blood Gluc  (DEXCOM G6 ) CRYSTAL, Use for personal CGM, Disp: 1 each, Rfl: 0    Continuous Blood Gluc Sensor (DEXCOM G6 SENSOR) MISC, Change every 10 days. Use for personal CGMS.  On Multiple insulin shots, checks blood sugars 4 times per day and requires frequent insulin adjustment. , Disp: 3 each, Rfl: 5    Continuous Blood Gluc Transmit (DEXCOM G6 TRANSMITTER) MISC, Change every 3 months, Disp: 1 each, Rfl: 3    insulin glargine (LANTUS SOLOSTAR) 100 UNIT/ML injection pen, ADMINISTER 70 UNITS UNDER THE SKIN ONCE A DAY IN AM, Disp: 10 pen, Rfl: 5    Glucagon, rDNA, (GLUCAGON EMERGENCY) 1 MG KIT, For severely low sugar, Disp: 1 kit, Rfl: 5    rosuvastatin (CRESTOR) 10 MG tablet, Take 1 tablet by mouth daily, Disp: 90 tablet, Rfl: 1    folic acid (FOLVITE) 1 MG tablet, Take 1 tab po daily. , Disp: 90 tablet, Rfl: 3    predniSONE (DELTASONE) 10 MG tablet, 2 tab po daily x 7 days. 1.5 tab po daily x 7 days. 1 tab po daily thereafter, Disp: 35 tablet, Rfl: 0    methotrexate (RHEUMATREX) 2.5 MG chemo tablet, Take 7 Tabs po once a week, same day every week., Disp: 35 tablet, Rfl: 0    insulin lispro (HUMALOG) 100 UNIT/ML injection vial, ADMINISTER 15 UNITS UNDER THE SKIN THREE TIMES DAILY BEFORE MEALS (Patient taking differently: ADMINISTER 30 UNITS UNDER THE SKIN THREE TIMES DAILY BEFORE MEALS), Disp: 10 mL, Rfl: 5    lisinopril (PRINIVIL;ZESTRIL) 10 MG tablet, Take 1 tablet by mouth daily, Disp: 90 tablet, Rfl: 1    Lancets MISC, 1 each by Does not apply route 2 times daily, Disp: 100 each, Rfl: 5    Insulin Syringe-Needle U-100 ([employer] INS SYR .3CC/29G) 29G X 1/2\" 0.3 ML MISC, 1 each by Does not apply route daily, Disp: 100 each, Rfl: 3      Review of Systems:    Constitutional: Negative for fever, chills, and unexpected weight change. HENT: Negative for congestion, ear pain, rhinorrhea,  sore throat and trouble swallowing. Eyes: Negative for photophobia, redness, itching. Respiratory: Negative for cough, shortness of breath and sputum. Cardiovascular: Negative for chest pain, palpitations and leg swelling. Gastrointestinal: Negative for nausea, vomiting, abdominal pain, diarrhea, constipation.    Endocrine: Negative for cold intolerance, heat intolerance, polydipsia, polyphagia and polyuria. Genitourinary: Negative for dysuria, urgency, frequency, hematuria and flank pain. Musculoskeletal: Negative for myalgias, back pain, arthralgias and neck pain. Skin/Nail: Negative for rash, itching. Normal nails. Neurological: Negative for seizures, weakness, light-headedness, numbness and headaches. Hematological/ Lymph nodes: Negative for adenopathy. Does not bruise/bleed easily. Psychiatric/Behavioral: Negative for suicidal ideas, depression, anxiety, sleep disturbance and decreased concentration. Objective:   Physical Exam:  /81 (Site: Left Upper Arm, Position: Sitting, Cuff Size: Large Adult)   Pulse 84   Temp 98.1 °F (36.7 °C)   Ht 5' 5\" (1.651 m)   Wt 270 lb (122.5 kg)   LMP 03/29/2022   SpO2 98%   BMI 44.93 kg/m²   Constitutional: Patient is oriented to person, place, and time. Patient appears well-developed and well-nourished. HENT:    Head: Normocephalic and atraumatic. Eyes: Conjunctivae and EOM are normal.     Neck: Normal range of motion. Thyroid normal   Cardiovascular: Normal rate, regular rhythm and normal heart sounds. heart murmur present. Pulmonary/Chest: Effort normal and breath sounds normal.   Abdominal: Soft. Bowel sounds are normal.   Musculoskeletal: Normal range of motion. Neurological: Patient is alert and oriented to person, place, and time. Patient has normal reflexes. Skin: Skin is warm and dry. Psychiatric: Patient has a normal mood and affect.  Patient behavior is normal.     Lab Review:    Orders Only on 03/10/2022   Component Date Value Ref Range Status    Cholesterol, Fasting 03/10/2022 168  0 - 199 mg/dL Final    Triglyceride, Fasting 03/10/2022 90  0 - 150 mg/dL Final    HDL 03/10/2022 39* 40 - 60 mg/dL Final    LDL Calculated 03/10/2022 111* <100 mg/dL Final    VLDL Cholesterol Calculated 03/10/2022 18  Not Established mg/dL Final    Sodium 03/10/2022 136  136 - 145 mmol/L Final    Potassium 03/10/2022 4.6  3.5 - 5.1 mmol/L Final    Chloride 03/10/2022 101  99 - 110 mmol/L Final    CO2 03/10/2022 21  21 - 32 mmol/L Final    Anion Gap 03/10/2022 14  3 - 16 Final    Glucose 03/10/2022 260* 70 - 99 mg/dL Final    BUN 03/10/2022 12  7 - 20 mg/dL Final    CREATININE 03/10/2022 <0.5* 0.6 - 1.1 mg/dL Final    GFR Non- 03/10/2022 >60  >60 Final    GFR  03/10/2022 >60  >60 Final    Calcium 03/10/2022 9.0  8.3 - 10.6 mg/dL Final    Total Protein 03/10/2022 6.8  6.4 - 8.2 g/dL Final    Albumin 03/10/2022 3.9  3.4 - 5.0 g/dL Final    Albumin/Globulin Ratio 03/10/2022 1.3  1.1 - 2.2 Final    Total Bilirubin 03/10/2022 <0.2  0.0 - 1.0 mg/dL Final    Alkaline Phosphatase 03/10/2022 125  40 - 129 U/L Final    ALT 03/10/2022 30  10 - 40 U/L Final    AST 03/10/2022 21  15 - 37 U/L Final    Microalbumin, Random Urine 03/10/2022 8.30* <2.0 mg/dL Final    Creatinine, Ur 03/10/2022 49.9  28.0 - 259.0 mg/dL Final    Microalbumin Creatinine Ratio 03/10/2022 166.3* 0.0 - 30.0 mg/g Final   Office Visit on 03/10/2022   Component Date Value Ref Range Status    Hemoglobin A1C 03/10/2022 7.7  % Final   Office Visit on 07/20/2021   Component Date Value Ref Range Status    Hemoglobin A1C 07/20/2021 7.9  % Final           Assessment and Plan     Artemio Miranda was seen today for consultation and diabetes. Diagnoses and all orders for this visit:    Type 1 diabetes mellitus with obesity (HCC)  -     insulin lispro, 1 Unit Dial, (HUMALOG KWIKPEN) 100 UNIT/ML SOPN; Inject 30 Units into the skin 3 times daily (before meals)  -      DIABETES FOOT EXAM  -     Continuous Blood Gluc  (DEXCOM G6 ) CRYSTAL; Use for personal CGM  -     Continuous Blood Gluc Sensor (DEXCOM G6 SENSOR) MISC; Change every 10 days. Use for personal CGMS. On Multiple insulin shots, checks blood sugars 4 times per day and requires frequent insulin adjustment.   -     Continuous Blood Gluc Transmit (DEXCOM G6 TRANSMITTER) MISC; Change every 3 months  -     Percy Tyler MD, (Anterior Segment Reconstruction, Comprehensive and Surgical Ophthalmology) [facility]  -     insulin glargine (LANTUS SOLOSTAR) 100 UNIT/ML injection pen; ADMINISTER 70 UNITS UNDER THE SKIN ONCE A DAY IN AM  -     Glucagon, rDNA, (GLUCAGON EMERGENCY) 1 MG KIT; For severely low sugar  -     rosuvastatin (CRESTOR) 10 MG tablet; Take 1 tablet by mouth daily    Type 1 diabetes mellitus with microalbuminuria (HCC)  -     insulin lispro, 1 Unit Dial, (HUMALOG KWIKPEN) 100 UNIT/ML SOPN; Inject 30 Units into the skin 3 times daily (before meals)  -      DIABETES FOOT EXAM  -     Continuous Blood Gluc  (DEXCOM G6 ) CRYSTAL; Use for personal CGM  -     Continuous Blood Gluc Sensor (DEXCOM G6 SENSOR) MISC; Change every 10 days. Use for personal CGMS. On Multiple insulin shots, checks blood sugars 4 times per day and requires frequent insulin adjustment. -     Continuous Blood Gluc Transmit (DEXCOM G6 TRANSMITTER) MISC; Change every 3 months  -     Percy Tyler MD, (Anterior Segment Reconstruction, Comprehensive and Surgical Ophthalmology) [facility]  -     insulin glargine (LANTUS SOLOSTAR) 100 UNIT/ML injection pen; ADMINISTER 70 UNITS UNDER THE SKIN ONCE A DAY IN AM  -     Glucagon, rDNA, (GLUCAGON EMERGENCY) 1 MG KIT; For severely low sugar  -     rosuvastatin (CRESTOR) 10 MG tablet; Take 1 tablet by mouth daily    Dyslipidemia due to type 1 diabetes mellitus (HCC)  -     insulin lispro, 1 Unit Dial, (HUMALOG KWIKPEN) 100 UNIT/ML SOPN; Inject 30 Units into the skin 3 times daily (before meals)  -      DIABETES FOOT EXAM  -     Continuous Blood Gluc  (DEXCOM G6 ) CRYSTAL; Use for personal CGM  -     Continuous Blood Gluc Sensor (DEXCOM G6 SENSOR) MISC; Change every 10 days. Use for personal CGMS.  On Multiple insulin shots, checks blood sugars 4 times per day and requires frequent insulin adjustment. -     Continuous Blood Gluc Transmit (DEXCOM G6 TRANSMITTER) MISC; Change every 3 months  -     [facility] - Percy Moran MD, (Anterior Segment Reconstruction, Comprehensive and Surgical Ophthalmology) Ophthalmology, [place]  -     insulin glargine (LANTUS SOLOSTAR) 100 UNIT/ML injection pen; ADMINISTER 70 UNITS UNDER THE SKIN ONCE A DAY IN AM  -     Glucagon, rDNA, (GLUCAGON EMERGENCY) 1 MG KIT; For severely low sugar  -     rosuvastatin (CRESTOR) 10 MG tablet; Take 1 tablet by mouth daily          1: Type 1 DM complicated wit nephropathy , hypoglycemias   Controlled A1C 7.7% March 2022     Very high variability   Hypoglycemias present   Brittle diabetes     Change Lantus to 70 units in am   Change Humalog 25 units tidac, 15 units for sandwich or snacks     Correction scale                   With meals (during the day)      < 150 ---     0 units                                  151-200 --  3 units                                  201-250 :    6 units                                   251-300:     9 units                                   301-350:     12 units                                   >350 :         15 units                                      Will send DEXCOM     Send glucagon     Will consider GLP1a next time   She knows it is not FDA approved   It may hep wit weight loss, appetite control      All instructions provided in written. Check Blood sugars 3-4 times per day. Log them along with insulin and send them every 2 weeks. Call for blood sugars less than 60 or more than 400. Eye exam: Last exam sometime ago. Referral done   Foot exam:  April 2022   Deformity/amputation: absent  Skin lesions/pre-ulcerative calluses: absent  Edema: right- negative, left- negative  Sensory exam: Monofilament sensation: normal  Pulses: normal, Vibration (128 Hz):  Intact    Renal screen: 142 > 166   TSH screen:   Saw CDE in past, not interested at this time     2: HTN   Controlled 3: Hyperlipidemia   LDL: 111 , HDL: 39 , TGs: 90 - March 2022    Start crestor 10 mg daily   Stop if pregnant , she has no plans     RTC in 4 weeks and then in 6 weeks with A1C, TSH, lipids, MACR, CMP       EDUCATION:   Greater than 50% of this visit was spent in general counseling regarding obesity, diet, exercise, importance of adherence to insulin regime, recognition and treatment of hypo and hyperglycemia,  glucose logging, proper diabetes management, diabetic complications with poor management and the importance of glycemic control in order to avoid the complications of diabetes. Risks and potential complications of diabetes were reviewed with the patient. Diabetes health maintenance plan and follow-up were discussed and understood by the patient. We reviewed the importance of medication compliance and regular follow-up. Aggressive lifestyle modification was encouraged. Exercise Counselling: This patient is (not) a candidate for regular physical exercise. Instructions to perform the following types of exercise:  Swimming or water aerobic exercise  Brisk walking  Playing tennis  Stationary bicycle or elliptical indoor  Low impact aerobic exercise    Instructions given to exercise for the following duration:  30 minutes a day for five-seven days per week.     Following instructions for being active throughout the day in addition to formal exercise:  Walk instead of drive whenever possible  Take the stairs instead of the elevator  Work in the garden  Park to the far end of the parking lot to add more walking steps to destination      Electronically signed by Ceci Guallpa MD on 4/4/2022 at 2:51 PM

## 2022-04-05 ENCOUNTER — TELEPHONE (OUTPATIENT)
Dept: ENDOCRINOLOGY | Age: 37
End: 2022-04-05

## 2022-04-20 ENCOUNTER — OFFICE VISIT (OUTPATIENT)
Dept: RHEUMATOLOGY | Age: 37
End: 2022-04-20
Payer: COMMERCIAL

## 2022-04-20 VITALS
HEIGHT: 65 IN | DIASTOLIC BLOOD PRESSURE: 76 MMHG | WEIGHT: 270 LBS | BODY MASS INDEX: 44.98 KG/M2 | SYSTOLIC BLOOD PRESSURE: 120 MMHG

## 2022-04-20 DIAGNOSIS — Z79.899 HIGH RISK MEDICATION USE: ICD-10-CM

## 2022-04-20 DIAGNOSIS — M05.79 RHEUMATOID ARTHRITIS INVOLVING MULTIPLE SITES WITH POSITIVE RHEUMATOID FACTOR (HCC): ICD-10-CM

## 2022-04-20 DIAGNOSIS — M05.79 RHEUMATOID ARTHRITIS INVOLVING MULTIPLE SITES WITH POSITIVE RHEUMATOID FACTOR (HCC): Primary | ICD-10-CM

## 2022-04-20 PROCEDURE — 99215 OFFICE O/P EST HI 40 MIN: CPT | Performed by: INTERNAL MEDICINE

## 2022-04-20 PROCEDURE — G8417 CALC BMI ABV UP PARAM F/U: HCPCS | Performed by: INTERNAL MEDICINE

## 2022-04-20 PROCEDURE — 1036F TOBACCO NON-USER: CPT | Performed by: INTERNAL MEDICINE

## 2022-04-20 PROCEDURE — G8427 DOCREV CUR MEDS BY ELIG CLIN: HCPCS | Performed by: INTERNAL MEDICINE

## 2022-04-20 RX ORDER — PREDNISONE 10 MG/1
TABLET ORAL
Qty: 60 TABLET | Refills: 0 | Status: SHIPPED | OUTPATIENT
Start: 2022-04-20 | End: 2022-05-16

## 2022-04-20 RX ORDER — HYDROXYCHLOROQUINE SULFATE 200 MG/1
200 TABLET, FILM COATED ORAL 2 TIMES DAILY
Qty: 60 TABLET | Refills: 1 | Status: SHIPPED | OUTPATIENT
Start: 2022-04-20 | End: 2022-07-14 | Stop reason: SDUPTHER

## 2022-04-20 NOTE — PROGRESS NOTES
[address], MD                                                           [address] [place] [address], [address] [facility]                                                              (P) [phone] (F)      Note is transcribed using voice recognition software. Inadvertent computerized transcription errors may be present. Patient identification: William Guevara, female : 1985,36 y.o. [id number] for seropositive rheumatoid arthritis-onset . Other medical problems-diabetes on insulin. Interval changes-she started methotrexate a month ago, tolerated well. Ran out of prednisone last week, has been experiencing more pain, swelling, stiffness in the joints. Works at [employer], affecting her functioning because of stiffness and pain in the joints. No other complaints or concerns today. Did not complete labs ordered x-rays in the last visit. States that she was running late for work therefore did not do it. Labs from -GRACIE negative, RF 15, CCP negative. PMH, PSH,Social history , Meds reviewed. Christine Buckley has rheumatoid arthritis. PHYSICAL EXAM:    Vitals:    /76   Ht 5' 5\" (1.651 m)   Wt 270 lb (122.5 kg)   LMP 2022   BMI 44.93 kg/m²   AA)x3 well nourished, and well groomed, normal judgement. MKS: Her physical findings are unchanged since last visit-all her MCPs, PIPs are tender, swollen, has active synovitis. Fist about 80% bilaterally. Both wrists are tender to palpation, actively inflamed. Similarly elbows are tender in joint line, ankles and MTP joints bilaterally. Has high disease activity inflammatory arthritis. Skin: No rashes, no induration or skin thickening or nodules.      DATA:       ASSESSMENT AND PLAN:  Brittany Dai was seen today for follow-up. Diagnoses and all orders for this visit:    Rheumatoid arthritis involving multiple sites with positive rheumatoid factor (HCC)  -     hydroxychloroquine (PLAQUENIL) 200 MG tablet; Take 1 tablet by mouth 2 times daily    High risk medication use  -     Hepatic Function Panel; Future  -     Creatinine; Future    BMI 40.0-44.9, adult (HCC)    Other orders  -     methotrexate (RHEUMATREX) 2.5 MG chemo tablet; Take 8 Tabs po once a week, same day every week,  split dose 6-8 hours apart on the same day. Safety alert: Labs as recommended by prescribing MD.  -     predniSONE (DELTASONE) 10 MG tablet; Take 1 tab po daily x 4 weeks, then 1/2 tab daily thereafter      Seropositive rheumatoid arthritis-positive RF, negative CCP-onset 2021-high disease activity-joint threatening disease. Other medical comorbidity-insulin-dependent diabetes. BMI 44-explained the importance and benefit of weight reduction not only in terms of arthritis and inflammation and overall health. Various measures were discussed including the key factor being calorie count, adequate hydration, sleep, and physical exercises. Advised patient to avoid refined carbohydrates, and sugar containing beverages. Plan-  As above. Check safety labs from last visit and today as well as hand x-rays. Increase methotrexate 20 mg weekly, add hydroxychloroquine 400 mg a day and continue prednisone taper. Continue folic acid 1 mg a day. I will see her back in 6 to 8 weeks, if disease remains active, will plan for biologic therapy. Office visit, management/treatment compliance/adherence reiterated. Patient showed understanding. #################################################################################################  Patient indicates understanding and agrees with the management plan.   Total time 42 minutes that includes the following-  Preparing to see the patient such as reviewing patients records, pre-charting, preparing the visit on the same day, performing a medically appropriate history and physical examination, counseling and educating patient about diagnosis, management plan, ordering appropriate testings, prescriptions, communicating findings to other care providers, and documenting clinical information in electronic medical record. I thank you for giving me the opportunity to be involved in [address] [place] [address] and I look forward following [place] along with you. If you have any questions or concerns please feel free to contact me at any time.   Matthieu Harley MD 04/20/22

## 2022-05-16 ENCOUNTER — OFFICE VISIT (OUTPATIENT)
Dept: ENDOCRINOLOGY | Age: 37
End: 2022-05-16
Payer: COMMERCIAL

## 2022-05-16 VITALS
DIASTOLIC BLOOD PRESSURE: 77 MMHG | OXYGEN SATURATION: 97 % | HEIGHT: 65 IN | HEART RATE: 77 BPM | BODY MASS INDEX: 46.19 KG/M2 | SYSTOLIC BLOOD PRESSURE: 116 MMHG | TEMPERATURE: 97.7 F | WEIGHT: 277.2 LBS

## 2022-05-16 DIAGNOSIS — E66.9 TYPE 1 DIABETES MELLITUS WITH OBESITY (HCC): ICD-10-CM

## 2022-05-16 DIAGNOSIS — E10.69 DYSLIPIDEMIA DUE TO TYPE 1 DIABETES MELLITUS (HCC): ICD-10-CM

## 2022-05-16 DIAGNOSIS — E10.29 TYPE 1 DIABETES MELLITUS WITH MICROALBUMINURIA (HCC): Primary | ICD-10-CM

## 2022-05-16 DIAGNOSIS — E78.5 DYSLIPIDEMIA DUE TO TYPE 1 DIABETES MELLITUS (HCC): ICD-10-CM

## 2022-05-16 DIAGNOSIS — E10.69 TYPE 1 DIABETES MELLITUS WITH OBESITY (HCC): ICD-10-CM

## 2022-05-16 DIAGNOSIS — R80.9 TYPE 1 DIABETES MELLITUS WITH MICROALBUMINURIA (HCC): Primary | ICD-10-CM

## 2022-05-16 PROCEDURE — 95251 CONT GLUC MNTR ANALYSIS I&R: CPT | Performed by: INTERNAL MEDICINE

## 2022-05-16 PROCEDURE — 99214 OFFICE O/P EST MOD 30 MIN: CPT | Performed by: INTERNAL MEDICINE

## 2022-05-16 PROCEDURE — 3051F HG A1C>EQUAL 7.0%<8.0%: CPT | Performed by: INTERNAL MEDICINE

## 2022-05-16 PROCEDURE — G8427 DOCREV CUR MEDS BY ELIG CLIN: HCPCS | Performed by: INTERNAL MEDICINE

## 2022-05-16 PROCEDURE — 2022F DILAT RTA XM EVC RTNOPTHY: CPT | Performed by: INTERNAL MEDICINE

## 2022-05-16 PROCEDURE — G8417 CALC BMI ABV UP PARAM F/U: HCPCS | Performed by: INTERNAL MEDICINE

## 2022-05-16 PROCEDURE — 1036F TOBACCO NON-USER: CPT | Performed by: INTERNAL MEDICINE

## 2022-05-16 RX ORDER — INSULIN GLARGINE 100 [IU]/ML
INJECTION, SOLUTION SUBCUTANEOUS
Qty: 15 PEN | Refills: 5 | Status: SHIPPED | OUTPATIENT
Start: 2022-05-16 | End: 2022-10-17 | Stop reason: SDUPTHER

## 2022-05-16 RX ORDER — EZETIMIBE 10 MG/1
10 TABLET ORAL DAILY
Qty: 30 TABLET | Refills: 3 | Status: SHIPPED | OUTPATIENT
Start: 2022-05-16

## 2022-05-16 NOTE — PROGRESS NOTES
Patient ID:   Shahla Mathias is a 29 y.o. female    Chief Complaint:   Shahla Mathias presents for an evaluation of Type 1 Diabetes Mellitus , Hyperlipidemia and hypertension. Subjective:   Type 1 Diabetes Mellitus diagnosed at age 9   Previously seen Dr. Denny Gifford     She is on prednisone 10 mg daily for RA. will be on it for 4 weeks     Lantus 80 units a bedtime. Am insulin caused morning hyperglycemias    Lispro 30 units for each meal and 15 units for snack. About 105 units per day   Correction: 3:50 above 150     Works from [employer] to [employer] and then [employer] to [employer]. 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 Reviewed meter 's   AM:   Lunch:  Supper:   HS:     Hypoglycemias:     Meals: Three or four, dinner is bigger. No snacks. Drinks crystal light, diet soda. Exercise: None more than work     Denies chest pain, exertional dyspnea.      Family history of CAD: None   Denies smoking/alcohol    The following portions of the patient's history were reviewed and updated as appropriate:       Family History   Problem Relation Age of Onset    No Known Problems Mother     Pulmonary Embolism Father     No Known Problems Sister     No Known Problems Brother     Heart Failure Maternal Grandmother          Social History     Socioeconomic History    Marital status: Single     Spouse name: Not on file    Number of children: Not on file    Years of education: Not on file    Highest education level: Not on file   Occupational History    Not on file   Tobacco Use    Smoking status: Never Smoker    Smokeless tobacco: Never Used   Vaping Use    Vaping Use: Never used   Substance and Sexual Activity    Alcohol use: No    Drug use: No    Sexual activity: Yes   Other Topics Concern    Not on file   Social History Narrative    Not on file     Social Determinants of Health     Financial Resource Strain:     Difficulty of Paying Living Expenses: Not on file   Food Insecurity:     Worried About Running Out of Food in the Last Year: Not on file    Michael of Food in the Last Year: Not on file   Transportation Needs:     Lack of Transportation (Medical): Not on file    Lack of Transportation (Non-Medical):  Not on file   Physical Activity:     Days of Exercise per Week: Not on file    Minutes of Exercise per Session: Not on file   Stress:     Feeling of Stress : Not on file   Social Connections:     Frequency of Communication with Friends and Family: Not on file    Frequency of Social Gatherings with Friends and Family: Not on file    Attends Mormonism Services: Not on file    Active Member of [address] [place] [address] or Organizations: Not on file    Attends Club or Organization Meetings: Not on file    Marital Status: Not on file   Intimate Partner Violence:     Fear of Current or Ex-Partner: Not on file    Emotionally Abused: Not on file    Physically Abused: Not on file    Sexually Abused: Not on file   Housing Stability:     Unable to Pay for Housing in the Last Year: Not on file    Number of [place] in the Last Year: Not on file    Unstable Housing in the Last Year: Not on file       Past Medical History:   Diagnosis Date    Diabetes mellitus ([facility] [address]        Past Surgical History:   Procedure Laterality Date     SECTION      HAND SURGERY Bilateral        No Known Allergies      Current Outpatient Medications:     insulin glargine (LANTUS SOLOSTAR) 100 UNIT/ML injection pen, 45 units bid, Disp: 15 pen, Rfl: 5    ezetimibe (ZETIA) 10 MG tablet, Take 1 tablet by mouth daily, Disp: 30 tablet, Rfl: 3    hydroxychloroquine (PLAQUENIL) 200 MG tablet, Take 1 tablet by mouth 2 times daily, Disp: 60 tablet, Rfl: 1    Blood Glucose Monitoring Suppl (TRUE METRIX AIR GLUCOSE METER) CRYSTAL, by Does not apply route, Disp: , Rfl:     Glucose Blood (TRUE METRIX BLOOD GLUCOSE TEST VI), 3 each daily, Disp: , Rfl:     Continuous Blood Gluc  (DEXCOM G6 ) CRYSTAL, Use for personal CGM, Disp: 1 each, Rfl: 0    Continuous Blood Gluc Sensor (DEXCOM G6 SENSOR) MISC, Change every 10 days. Use for personal CGMS. On Multiple insulin shots, checks blood sugars 4 times per day and requires frequent insulin adjustment. , Disp: 3 each, Rfl: 5    Continuous Blood Gluc Transmit (DEXCOM G6 TRANSMITTER) MISC, Change every 3 months, Disp: 1 each, Rfl: 3    Glucagon, rDNA, (GLUCAGON EMERGENCY) 1 MG KIT, For severely low sugar, Disp: 1 kit, Rfl: 5    folic acid (FOLVITE) 1 MG tablet, Take 1 tab po daily. , Disp: 90 tablet, Rfl: 3    predniSONE (DELTASONE) 10 MG tablet, 2 tab po daily x 7 days. 1.5 tab po daily x 7 days. 1 tab po daily thereafter (Patient taking differently: Take 10 mg by mouth daily ), Disp: 35 tablet, Rfl: 0    methotrexate (RHEUMATREX) 2.5 MG chemo tablet, Take 7 Tabs po once a week, same day every week. (Patient taking differently: Take 8 Tabs po once a week, same day every week.), Disp: 35 tablet, Rfl: 0    insulin lispro (HUMALOG) 100 UNIT/ML injection vial, ADMINISTER 15 UNITS UNDER THE SKIN THREE TIMES DAILY BEFORE MEALS (Patient taking differently: ADMINISTER 30 UNITS UNDER THE SKIN THREE TIMES DAILY BEFORE MEALS), Disp: 10 mL, Rfl: 5    lisinopril (PRINIVIL;ZESTRIL) 10 MG tablet, Take 1 tablet by mouth daily, Disp: 90 tablet, Rfl: 1    Lancets MISC, 1 each by Does not apply route 2 times daily, Disp: 100 each, Rfl: 5    Insulin Syringe-Needle U-100 ([employer] INS SYR .3CC/29G) 29G X 1/2\" 0.3 ML MISC, 1 each by Does not apply route daily, Disp: 100 each, Rfl: 3    rosuvastatin (CRESTOR) 10 MG tablet, Take 1 tablet by mouth daily (Patient not taking: Reported on 5/16/2022), Disp: 90 tablet, Rfl: 1      Review of Systems:    Constitutional: Negative for fever, chills, and unexpected weight change. HENT: Negative for congestion, ear pain, rhinorrhea,  sore throat and trouble swallowing. Eyes: Negative for photophobia, redness, itching. Respiratory: Negative for cough, shortness of breath and sputum. Cardiovascular: Negative for chest pain, palpitations and leg swelling. Gastrointestinal: Negative for nausea, vomiting, abdominal pain, diarrhea, constipation. Endocrine: Negative for cold intolerance, heat intolerance, polydipsia, polyphagia and polyuria. Genitourinary: Negative for dysuria, urgency, frequency, hematuria and flank pain. Musculoskeletal: Negative for myalgias, back pain, arthralgias and neck pain. Skin/Nail: Negative for rash, itching. Normal nails. Neurological: Negative for seizures, weakness, light-headedness, numbness and headaches. Hematological/ Lymph nodes: Negative for adenopathy. Does not bruise/bleed easily. Psychiatric/Behavioral: Negative for suicidal ideas, depression, anxiety, sleep disturbance and decreased concentration. Objective:   Physical Exam:  /77 (Site: Right Upper Arm, Position: Sitting, Cuff Size: Large Adult)   Pulse 77   Temp 97.7 °F (36.5 °C)   Ht 5' 5\" (1.651 m)   Wt 277 lb 3.2 oz (125.7 kg)   LMP 05/09/2022   SpO2 97%   BMI 46.13 kg/m²   Constitutional: Patient is oriented to person, place, and time. Patient appears well-developed and well-nourished. HENT:    Head: Normocephalic and atraumatic. Eyes: Conjunctivae and EOM are normal.     Neck: Normal range of motion. Musculoskeletal: Normal range of motion. Neurological: Patient is alert and oriented to person, place, and time. Psychiatric: Patient has a normal mood and affect.  Patient behavior is normal.     Lab Review:    Orders Only on 03/10/2022   Component Date Value Ref Range Status    Cholesterol, Fasting 03/10/2022 168  0 - 199 mg/dL Final    Triglyceride, Fasting 03/10/2022 90  0 - 150 mg/dL Final    HDL 03/10/2022 39* 40 - 60 mg/dL Final    LDL Calculated 03/10/2022 111* <100 mg/dL Final    VLDL Cholesterol Calculated 03/10/2022 18  Not Established mg/dL Final    Sodium 03/10/2022 136 136 - 145 mmol/L Final    Potassium 03/10/2022 4.6  3.5 - 5.1 mmol/L Final    Chloride 03/10/2022 101  99 - 110 mmol/L Final    CO2 03/10/2022 21  21 - 32 mmol/L Final    Anion Gap 03/10/2022 14  3 - 16 Final    Glucose 03/10/2022 260* 70 - 99 mg/dL Final    BUN 03/10/2022 12  7 - 20 mg/dL Final    CREATININE 03/10/2022 <0.5* 0.6 - 1.1 mg/dL Final    GFR Non- 03/10/2022 >60  >60 Final    GFR  03/10/2022 >60  >60 Final    Calcium 03/10/2022 9.0  8.3 - 10.6 mg/dL Final    Total Protein 03/10/2022 6.8  6.4 - 8.2 g/dL Final    Albumin 03/10/2022 3.9  3.4 - 5.0 g/dL Final    Albumin/Globulin Ratio 03/10/2022 1.3  1.1 - 2.2 Final    Total Bilirubin 03/10/2022 <0.2  0.0 - 1.0 mg/dL Final    Alkaline Phosphatase 03/10/2022 125  40 - 129 U/L Final    ALT 03/10/2022 30  10 - 40 U/L Final    AST 03/10/2022 21  15 - 37 U/L Final    Microalbumin, Random Urine 03/10/2022 8.30* <2.0 mg/dL Final    Creatinine, Ur 03/10/2022 49.9  28.0 - 259.0 mg/dL Final    Microalbumin Creatinine Ratio 03/10/2022 166.3* 0.0 - 30.0 mg/g Final   Office Visit on 03/10/2022   Component Date Value Ref Range Status    Hemoglobin A1C 03/10/2022 7.7  % Final   Office Visit on 07/20/2021   Component Date Value Ref Range Status    Hemoglobin A1C 07/20/2021 7.9  % Final           Assessment and Plan     Harvey Plascencia was seen today for diabetes. Diagnoses and all orders for this visit:    Type 1 diabetes mellitus with microalbuminuria (HCC)  -     insulin glargine (LANTUS SOLOSTAR) 100 UNIT/ML injection pen; 45 units bid  -     Comprehensive Metabolic Panel; Future  -     Hemoglobin A1C; Future  -     Microalbumin / Creatinine Urine Ratio; Future  -     TSH with Reflex to FT4; Future  -     Lipid, Fasting; Future    Dyslipidemia due to type 1 diabetes mellitus (HCC)  -     insulin glargine (LANTUS SOLOSTAR) 100 UNIT/ML injection pen; 45 units bid  -     Comprehensive Metabolic Panel;  Future  -     Hemoglobin A1C; Future  -     Microalbumin / Creatinine Urine Ratio; Future  -     TSH with Reflex to FT4; Future  -     ezetimibe (ZETIA) 10 MG tablet; Take 1 tablet by mouth daily  -     Lipid, Fasting; Future    Type 1 diabetes mellitus with obesity (HCC)  -     insulin glargine (LANTUS SOLOSTAR) 100 UNIT/ML injection pen; 45 units bid  -     Comprehensive Metabolic Panel; Future  -     Hemoglobin A1C; Future  -     Microalbumin / Creatinine Urine Ratio; Future  -     TSH with Reflex to FT4; Future  -     Lipid, Fasting; Future          1: Type 1 DM complicated wit nephropathy , hypoglycemias   Controlled A1C 7.7% March 2022     Very high variability   Hypoglycemias present   Brittle diabetes   She has dawns phenomenon     DEXCOM personal CGMS data downloaded and reviewed   Average glucose: 200  Blood sugars: Above 180 - 56 % ,  - 41%, below 70 - 3 %  Blood sugars are good in am  high from 6-9am without eating   During the day blood sugars are running well   Post meal hyperglycemia after carb diet   No activity related changes in blood sugars   Hypoglycemia: few mild lows   Based on the data, changes below     Change Lantus to 45 units twice a day   Change Humalog to 25 units for breakfast and lunch, 30 units for dinner. 15 units for sandwich or snacks     Correction scale                   With meals (during the day)      < 150 ---     0 units                                  151-200 --  3 units                                  201-250 :    6 units                                   251-300:     9 units                                   301-350:     12 units                                   >350 :         15 units                                      Continue with DEXCOM     [facility] out of glucagon     Will consider GLP1a next time   She knows it is not FDA approved   It may hep wit weight loss, appetite control      All instructions provided in written. Check Blood sugars 3-4 times per day.  Log them along with possible  Take the stairs instead of the elevator  Work in the garden  Park to the far end of the parking lot to add more walking steps to destination      Electronically signed by Annemarie Denise MD on 5/16/2022 at 4:14 PM

## 2022-05-16 NOTE — PATIENT INSTRUCTIONS
Change Lantus to 45 units twice a day   Change Humalog to 25 units for breakfast and lunch, 30 units for dinner.  15 units for sandwich or snacks     Correction scale                   With meals (during the day)      < 150 ---     0 units                                  151-200 --  3 units                                  201-250 :    6 units                                   251-300:     9 units                                   301-350:     12 units                                   >350 :         15 units

## 2022-06-03 ENCOUNTER — TELEPHONE (OUTPATIENT)
Dept: ENDOCRINOLOGY | Age: 37
End: 2022-06-03

## 2022-06-03 DIAGNOSIS — E10.69 TYPE 1 DIABETES MELLITUS WITH OBESITY (HCC): ICD-10-CM

## 2022-06-03 DIAGNOSIS — E78.5 DYSLIPIDEMIA DUE TO TYPE 1 DIABETES MELLITUS (HCC): ICD-10-CM

## 2022-06-03 DIAGNOSIS — E10.29 TYPE 1 DIABETES MELLITUS WITH MICROALBUMINURIA (HCC): ICD-10-CM

## 2022-06-03 DIAGNOSIS — E10.69 DYSLIPIDEMIA DUE TO TYPE 1 DIABETES MELLITUS (HCC): ICD-10-CM

## 2022-06-03 DIAGNOSIS — R80.9 TYPE 1 DIABETES MELLITUS WITH MICROALBUMINURIA (HCC): ICD-10-CM

## 2022-06-03 DIAGNOSIS — E66.9 TYPE 1 DIABETES MELLITUS WITH OBESITY (HCC): ICD-10-CM

## 2022-06-03 RX ORDER — INSULIN LISPRO 100 [IU]/ML
INJECTION, SOLUTION INTRAVENOUS; SUBCUTANEOUS
Qty: 30 ML | Refills: 1 | Status: SHIPPED | OUTPATIENT
Start: 2022-06-03

## 2022-06-04 NOTE — TELEPHONE ENCOUNTER
FYI   Patient called that she needs refills for insulin lispro. She requested pens, not vials. I called in prescription to pharmacy for insulin lispro 30 units 3 times a day before meals as in previous lispro refill, 10 pens with no refills.

## 2022-06-06 RX ORDER — INSULIN LISPRO 100 [IU]/ML
INJECTION, SOLUTION INTRAVENOUS; SUBCUTANEOUS
Qty: 30 ML | OUTPATIENT
Start: 2022-06-06

## 2022-06-07 DIAGNOSIS — R80.9 TYPE 1 DIABETES MELLITUS WITH MICROALBUMINURIA (HCC): ICD-10-CM

## 2022-06-07 DIAGNOSIS — E10.29 TYPE 1 DIABETES MELLITUS WITH MICROALBUMINURIA (HCC): ICD-10-CM

## 2022-06-07 RX ORDER — CALCIUM CITRATE/VITAMIN D3 200MG-6.25
TABLET ORAL
Qty: 100 STRIP | Refills: 5 | Status: SHIPPED | OUTPATIENT
Start: 2022-06-07

## 2022-06-07 NOTE — TELEPHONE ENCOUNTER
Medication:   Requested Prescriptions     Pending Prescriptions Disp Refills    TRUE METRIX BLOOD GLUCOSE TEST strip [Pharmacy Med Name: TRUE METRIX BLOOD GLUCOSE TEST STRP] 100 strip      Sig: USE 1 STRIP TO TEST BLOOD SUGAR FOUR TIMES DAILY AS DIRECTED        Last Filled:      Patient Phone Number: [phone] (home)     Last appt: 3/10/2022   Next appt: Visit date not found  Return in about 6 weeks (around 6/27/2022). Last OARRS: No flowsheet data found.

## 2022-06-11 RX ORDER — PREDNISONE 10 MG/1
TABLET ORAL
Qty: 60 TABLET | OUTPATIENT
Start: 2022-06-11

## 2022-06-22 DIAGNOSIS — M05.79 RHEUMATOID ARTHRITIS INVOLVING MULTIPLE SITES WITH POSITIVE RHEUMATOID FACTOR (HCC): ICD-10-CM

## 2022-06-22 RX ORDER — HYDROXYCHLOROQUINE SULFATE 200 MG/1
TABLET, FILM COATED ORAL
Qty: 60 TABLET | Refills: 1 | OUTPATIENT
Start: 2022-06-22

## 2022-07-13 NOTE — PROGRESS NOTES
2022     Toney Powers (:  1985) is a 29 y.o. female, here for evaluation of the following medical concerns:    HPI  Diabetes Mellitus Type 2: Current symptoms/problems include none. Medication compliance:  compliant most of the time  Diabetic diet compliance:  compliant most of the time,  Weight trend: stable  Current exercise: no regular exercise  Barriers: none    Home blood sugar records: fasting range: 140-160, postprandial range: 110-140  Any episodes of hypoglycemia? no  Eye exam current (within one year): no   reports that she has never smoked. She has never used smokeless tobacco.   Daily Aspirin? No    Lab Results   Component Value Date    LABA1C 7.7 03/10/2022    LABA1C 7.9 2021    LABA1C 8.6 2021     Lab Results   Component Value Date    LABMICR 8.30 (H) 03/10/2022    CREATININE <0.5 (L) 03/10/2022     Lab Results   Component Value Date    ALT 30 03/10/2022    AST 21 03/10/2022     Lab Results   Component Value Date    HDL 39 (L) 03/10/2022    LDLCALC 111 (H) 03/10/2022        Rheumatoid arthritis: Longstanding diagnosis. Had previously been followed by rheumatology through [facility]. Unfortunately, she was dismissed after she missed too many appointments. She has been without her prednisone, hydroxychloroquine and methotrexate for 1 month. She is complaining of fatigue, joint aches, most notably her right elbow. Review of Systems   Constitutional: Negative for activity change, appetite change, fatigue and unexpected weight change. Eyes: Negative for visual disturbance. Respiratory: Negative for chest tightness and shortness of breath. Gastrointestinal: Negative for abdominal distention, abdominal pain, diarrhea and nausea. Endocrine: Negative for polydipsia, polyphagia and polyuria. Genitourinary: Negative for decreased urine volume, dysuria and frequency. Musculoskeletal: Positive for arthralgias. Negative for gait problem and myalgias.    Skin: Negative for rash and wound. Neurological: Negative for dizziness, weakness, light-headedness and numbness. Hematological: Does not bruise/bleed easily. Prior to Visit Medications    Medication Sig Taking? Authorizing Provider   folic acid (FOLVITE) 1 MG tablet Take 1 tab po daily. Yes Airam Damon, DO   predniSONE (DELTASONE) 10 MG tablet Take 1 tablet by mouth daily Yes Airam Damon, DO   methotrexate (RHEUMATREX) 2.5 MG chemo tablet Take 8 Tabs po once a week, same day every week. Yes Airam Damon, DO   hydroxychloroquine (PLAQUENIL) 200 MG tablet Take 1 tablet by mouth 2 times daily Yes Airam Damon, DO   TRUE METRIX BLOOD GLUCOSE TEST strip USE 1 STRIP TO TEST BLOOD SUGAR FOUR TIMES DAILY AS DIRECTED Yes Airam Damon, DO   insulin lispro, 1 Unit Dial, 100 UNIT/ML SOPN INJECT 30 UNITS UNDER THE SKIN THREE TIMES DAILY BEFORE MEALS Yes Dominga Kate MD   insulin glargine (LANTUS SOLOSTAR) 100 UNIT/ML injection pen 45 units bid Yes Dominga Kate MD   ezetimibe (ZETIA) 10 MG tablet Take 1 tablet by mouth daily Yes Dominga Kate MD   Blood Glucose Monitoring Suppl (TRUE METRIX AIR GLUCOSE METER) CRYSTAL by Does not apply route Yes Historical Provider, MD   Continuous Blood Gluc  (DEXCOM G6 ) CRYSTAL Use for personal CGM Yes Dominga Kate MD   Continuous Blood Gluc Sensor (DEXCOM G6 SENSOR) MISC Change every 10 days. Use for personal CGMS. On Multiple insulin shots, checks blood sugars 4 times per day and requires frequent insulin adjustment.  Yes Dominga Kate MD   Continuous Blood Gluc Transmit (DEXCOM G6 TRANSMITTER) MISC Change every 3 months Yes Dominga Kate MD   Glucagon, rDNA, (GLUCAGON EMERGENCY) 1 MG KIT For severely low sugar Yes Dominga Kate MD   rosuvastatin (CRESTOR) 10 MG tablet Take 1 tablet by mouth daily Yes Dominga Kate MD   insulin lispro (HUMALOG) 100 UNIT/ML injection vial ADMINISTER 15 UNITS UNDER THE SKIN THREE TIMES DAILY BEFORE MEALS  Patient taking differently: ADMINISTER 30 UNITS UNDER THE SKIN THREE TIMES DAILY BEFORE MEALS Yes Minerva Walton, DO   lisinopril (PRINIVIL;ZESTRIL) 10 MG tablet Take 1 tablet by mouth daily Yes Minerva Walton DO   Lancets MISC 1 each by Does not apply route 2 times daily Yes Minerva Walton DO   Insulin Syringe-Needle U-100 ([employer] INS SYR .3CC/29G) 29G X 1/2\" 0.3 ML MISC 1 each by Does not apply route daily Yes Minerva Walton, DO        Social History     Tobacco Use    Smoking status: Never Smoker    Smokeless tobacco: Never Used   Substance Use Topics    Alcohol use: No        Vitals:    07/14/22 1237   BP: 139/74   Pulse: 85   Temp: 98.7 °F (37.1 °C)   TempSrc: Temporal   Weight: 284 lb 3.2 oz (128.9 kg)   Height: 5' 5\" (1.651 m)     Estimated body mass index is 47.29 kg/m² as calculated from the following:    Height as of this encounter: 5' 5\" (1.651 m). Weight as of this encounter: 284 lb 3.2 oz (128.9 kg). Physical Exam  Vitals reviewed. Constitutional:       Appearance: Normal appearance. She is obese. HENT:      Head: Normocephalic and atraumatic. Right Ear: Tympanic membrane, ear canal and external ear normal.      Left Ear: Tympanic membrane, ear canal and external ear normal.      Nose: Nose normal.   Eyes:      Extraocular Movements: Extraocular movements intact. Conjunctiva/sclera: Conjunctivae normal.   Cardiovascular:      Rate and Rhythm: Normal rate and regular rhythm. Pulses: Normal pulses. Heart sounds: Normal heart sounds. Pulmonary:      Effort: Pulmonary effort is normal.      Breath sounds: Normal breath sounds. Abdominal:      General: Abdomen is flat. Bowel sounds are normal.      Palpations: Abdomen is soft. Musculoskeletal:         General: Tenderness (Gentle palpation lateral aspect right elbow) present. No deformity. Normal range of motion. Cervical back: Normal range of motion and neck supple.    Skin:     General: Skin is warm and dry. Capillary Refill: Capillary refill takes less than 2 seconds. Findings: No lesion or rash. Neurological:      General: No focal deficit present. Mental Status: She is alert and oriented to person, place, and time. Mental status is at baseline. Sensory: No sensory deficit. Psychiatric:         Mood and Affect: Mood normal.         Behavior: Behavior normal.         Thought Content: Thought content normal.         Judgment: Judgment normal.         ASSESSMENT/PLAN:  1. Type 1 diabetes mellitus with microalbuminuria ([facility] [address]): Follows with continuous glucose monitoring center. Fingersticks have been good. A1c 7.7. Has been off her prednisone for her rheumatoid arthritis. Did explain resuming this, which needs to be done because she is having joint pain, will cause her sugars to go up. 2. Hypertension associated with diabetes ([facility] [address]): At goal today. Tolerating current regimen without side effects. Refills given. 3. Type 1 diabetes mellitus with hyperlipidemia ([facility] [address]): Compliant with Zetia. Given information on the Mediterranean diet. 4. Type 1 diabetes mellitus with obesity [facility]): Complicates all aspects the patient's care. 5. Rheumatoid arthritis involving multiple sites with positive rheumatoid factor ([facility] [address]): Fired by her previous rheumatologist.  Getting her set up with rheumatology through [facility]. Resuming her medications as below. Having a lot of joint pain, so we will load her with a Medrol shot today in clinic. Follow-up 6 weeks if unable to see rheumatology otherwise 3 months. - External Referral To Rheumatology  - folic acid (FOLVITE) 1 MG tablet; Take 1 tab po daily. Dispense: 90 tablet; Refill: 3  - predniSONE (DELTASONE) 10 MG tablet; Take 1 tablet by mouth daily  Dispense: 35 tablet; Refill: 1  - methotrexate (RHEUMATREX) 2.5 MG chemo tablet; Take 8 Tabs po once a week, same day every week. Dispense: 35 tablet;  Refill: 0  - hydroxychloroquine (PLAQUENIL) 200 MG tablet; Take 1 tablet by mouth 2 times daily  Dispense: 60 tablet; Refill: 1    6. Need for prophylactic vaccination against Streptococcus pneumoniae (pneumococcus): Declined by patient      Return in about 3 months (around 10/14/2022) for Diabetes. An electronic signature was used to authenticate this note.     --Erica Corrales DO on 7/14/2022 at 12:59 PM

## 2022-07-13 NOTE — PATIENT INSTRUCTIONS
Learning About Diabetes and Exercise  Can you exercise if you have diabetes? When you have diabetes, it's important to get regular exercise. This helps control your blood sugar level. You can still play sports, run, ride a bike, go swimming, and do other activities when you have diabetes. How can exercise help you manage diabetes? Your body turns the food you eat into glucose, a type of sugar. You need this sugar for energy. When you have diabetes, the sugar builds up in your blood. But when you exercise, your body uses sugar. This helps keep it from building up in your blood and results in lower blood sugar and better control of diabetes. Exercise may help you in other ways too. It can help you reach and stay at a healthy weight. It also helps improve blood pressure and cholesterol, which can reduce the risk of heart disease. Exercise can make you feel stronger and happier. It can help you relax and sleep better, and give you confidence in other things you do. How can you exercise safely? Before you start a new exercise program, talk to your doctor about how and when to exercise. You may need to have a medical exam and tests before you begin. Some types of exercise can be harmful if your diabetes is causing other problems, such as problems with your feet. Your doctor can tell you what types of exercise are good choices for you. These tips can help you exercise safely when you have diabetes. If your diabetes is controlled by diet or medicine that doesn't lower your blood sugar, you don't need to eat a snack before you exercise. · Check your blood sugar before you exercise. And be careful about what you eat. ? If your blood sugar is less than 100, eat a carbohydrate snack before you exercise. ? Be careful when you exercise if your blood sugar is over 300. High blood sugar can make you dehydrated. And that makes your blood sugar levels go even higher.  If you have ketones in your blood or urine and your blood sugar is over 300, do not exercise. · Don't try to do too much at first. Build up your exercise program bit by bit. Try to get at least 30 minutes of exercise on most days of the week. Walking is a good choice. You also may want to do other activities, such as riding a bike or swimming. You might try running or gardening. Try to include muscle-strengthening exercises at least 2 times a week. These exercises include push-ups and weight training. You can also use rubber tubing or stretch bands. You stretch or pull the tubing or band to build muscle strength. If you want to exercise more, slowly increase how hard or long you exercise. · You may get symptoms of low blood sugar during exercise or up to 24 hours later. Some symptoms of low blood sugar, such as sweating, a fast heartbeat, or feeling tired, can be confused with what can happen anytime you exercise. Other symptoms may include feeling anxious, dizzy, weak, or shaky. So it's a good idea to check your blood sugar again. · You can treat low blood sugar by eating or drinking something that has 15 grams of carbohydrate. These should be quick-sugar foods. Quick-sugar foods such as glucose tablets, table sugar, honey, fruit juice, regular (not diet) soda pop, or hard candy can help raise blood sugar. Check your blood sugar level again 15 minutes after having a quick-sugar food to make sure your level is getting back to your target range. · Drink plenty of water before, during, and after you exercise. · Wear medical alert jewelry that says you have diabetes. You can buy this at most drugstores. · Pay attention to your body. If you are used to exercise and notice that you can't do as much as usual, talk to your doctor. Follow-up care is a key part of your treatment and safety. Be sure to make and go to all appointments, and call your doctor if you are having problems.  It's also a good idea to know your test results and keep a list of the medicines you take.  Where can you learn more? Go to [email or web address] and sign in to your [employer] account. Enter T646 in the [employer] box to learn more about \"Learning About Diabetes and Exercise. \"     If you do not have an account, please click on the \"Sign Up Now\" link. Current as of: December 20, 2019               Content Version: 12.5  © 4794-8730 Healthwise, Incorporated. Care instructions adapted under license by [facility]. If you have questions about a medical condition or this instruction, always ask your healthcare professional. [address] any warranty or liability for your use of this information.

## 2022-07-14 ENCOUNTER — OFFICE VISIT (OUTPATIENT)
Dept: PRIMARY CARE CLINIC | Age: 37
End: 2022-07-14
Payer: COMMERCIAL

## 2022-07-14 VITALS
BODY MASS INDEX: 47.35 KG/M2 | SYSTOLIC BLOOD PRESSURE: 139 MMHG | WEIGHT: 284.2 LBS | HEART RATE: 85 BPM | TEMPERATURE: 98.7 F | HEIGHT: 65 IN | DIASTOLIC BLOOD PRESSURE: 74 MMHG

## 2022-07-14 DIAGNOSIS — E66.9 TYPE 1 DIABETES MELLITUS WITH OBESITY (HCC): ICD-10-CM

## 2022-07-14 DIAGNOSIS — Z23 NEED FOR PROPHYLACTIC VACCINATION AGAINST STREPTOCOCCUS PNEUMONIAE (PNEUMOCOCCUS): ICD-10-CM

## 2022-07-14 DIAGNOSIS — M05.79 RHEUMATOID ARTHRITIS INVOLVING MULTIPLE SITES WITH POSITIVE RHEUMATOID FACTOR (HCC): ICD-10-CM

## 2022-07-14 DIAGNOSIS — E10.69 TYPE 1 DIABETES MELLITUS WITH OBESITY (HCC): ICD-10-CM

## 2022-07-14 DIAGNOSIS — I15.2 HYPERTENSION ASSOCIATED WITH DIABETES (HCC): ICD-10-CM

## 2022-07-14 DIAGNOSIS — E10.29 TYPE 1 DIABETES MELLITUS WITH MICROALBUMINURIA (HCC): Primary | ICD-10-CM

## 2022-07-14 DIAGNOSIS — E78.5 TYPE 1 DIABETES MELLITUS WITH HYPERLIPIDEMIA (HCC): ICD-10-CM

## 2022-07-14 DIAGNOSIS — E10.69 TYPE 1 DIABETES MELLITUS WITH HYPERLIPIDEMIA (HCC): ICD-10-CM

## 2022-07-14 DIAGNOSIS — E11.59 HYPERTENSION ASSOCIATED WITH DIABETES (HCC): ICD-10-CM

## 2022-07-14 DIAGNOSIS — R80.9 TYPE 1 DIABETES MELLITUS WITH MICROALBUMINURIA (HCC): Primary | ICD-10-CM

## 2022-07-14 PROCEDURE — 3051F HG A1C>EQUAL 7.0%<8.0%: CPT | Performed by: STUDENT IN AN ORGANIZED HEALTH CARE EDUCATION/TRAINING PROGRAM

## 2022-07-14 PROCEDURE — G8417 CALC BMI ABV UP PARAM F/U: HCPCS | Performed by: STUDENT IN AN ORGANIZED HEALTH CARE EDUCATION/TRAINING PROGRAM

## 2022-07-14 PROCEDURE — 2022F DILAT RTA XM EVC RTNOPTHY: CPT | Performed by: STUDENT IN AN ORGANIZED HEALTH CARE EDUCATION/TRAINING PROGRAM

## 2022-07-14 PROCEDURE — G8427 DOCREV CUR MEDS BY ELIG CLIN: HCPCS | Performed by: STUDENT IN AN ORGANIZED HEALTH CARE EDUCATION/TRAINING PROGRAM

## 2022-07-14 PROCEDURE — 1036F TOBACCO NON-USER: CPT | Performed by: STUDENT IN AN ORGANIZED HEALTH CARE EDUCATION/TRAINING PROGRAM

## 2022-07-14 PROCEDURE — 99214 OFFICE O/P EST MOD 30 MIN: CPT | Performed by: STUDENT IN AN ORGANIZED HEALTH CARE EDUCATION/TRAINING PROGRAM

## 2022-07-14 RX ORDER — HYDROXYCHLOROQUINE SULFATE 200 MG/1
200 TABLET, FILM COATED ORAL 2 TIMES DAILY
Qty: 60 TABLET | Refills: 1 | Status: SHIPPED | OUTPATIENT
Start: 2022-07-14 | End: 2022-09-14

## 2022-07-14 RX ORDER — METHYLPREDNISOLONE ACETATE 40 MG/ML
40 INJECTION, SUSPENSION INTRA-ARTICULAR; INTRALESIONAL; INTRAMUSCULAR; SOFT TISSUE ONCE
Status: SHIPPED | OUTPATIENT
Start: 2022-07-14

## 2022-07-14 RX ORDER — METHYLPREDNISOLONE ACETATE 80 MG/ML
80 INJECTION, SUSPENSION INTRA-ARTICULAR; INTRALESIONAL; INTRAMUSCULAR; SOFT TISSUE ONCE
Status: COMPLETED | OUTPATIENT
Start: 2022-07-14 | End: 2022-07-14

## 2022-07-14 RX ORDER — FOLIC ACID 1 MG/1
TABLET ORAL
Qty: 90 TABLET | Refills: 3 | Status: SHIPPED | OUTPATIENT
Start: 2022-07-14

## 2022-07-14 RX ORDER — PREDNISONE 10 MG/1
10 TABLET ORAL DAILY
Qty: 35 TABLET | Refills: 1 | Status: SHIPPED | OUTPATIENT
Start: 2022-07-14 | End: 2022-10-03 | Stop reason: SDUPTHER

## 2022-07-14 RX ORDER — KETOROLAC TROMETHAMINE 15 MG/ML
15 INJECTION, SOLUTION INTRAMUSCULAR; INTRAVENOUS ONCE
Status: SHIPPED | OUTPATIENT
Start: 2022-07-14

## 2022-07-14 RX ADMIN — METHYLPREDNISOLONE ACETATE 80 MG: 80 INJECTION, SUSPENSION INTRA-ARTICULAR; INTRALESIONAL; INTRAMUSCULAR; SOFT TISSUE at 13:11

## 2022-07-14 SDOH — ECONOMIC STABILITY: FOOD INSECURITY: WITHIN THE PAST 12 MONTHS, YOU WORRIED THAT YOUR FOOD WOULD RUN OUT BEFORE YOU GOT MONEY TO BUY MORE.: NEVER TRUE

## 2022-07-14 SDOH — ECONOMIC STABILITY: FOOD INSECURITY: WITHIN THE PAST 12 MONTHS, THE FOOD YOU BOUGHT JUST DIDN'T LAST AND YOU DIDN'T HAVE MONEY TO GET MORE.: NEVER TRUE

## 2022-07-14 ASSESSMENT — ENCOUNTER SYMPTOMS
NAUSEA: 0
ABDOMINAL DISTENTION: 0
ABDOMINAL PAIN: 0
CHEST TIGHTNESS: 0
SHORTNESS OF BREATH: 0
DIARRHEA: 0

## 2022-07-14 ASSESSMENT — SOCIAL DETERMINANTS OF HEALTH (SDOH): HOW HARD IS IT FOR YOU TO PAY FOR THE VERY BASICS LIKE FOOD, HOUSING, MEDICAL CARE, AND HEATING?: NOT HARD AT ALL

## 2022-07-20 NOTE — TELEPHONE ENCOUNTER
Mrs. Ghanshyam Valladares informed PA for [facility] system has been approved.
Submitted PA for Dexcom G6  device   Key: [id number]  [facility] STATUS:   pproved for DEXCOM G6  Device, quantity up to 1 per 30 days, under the pharmacy benefit. The drug has been approved from 04/05/2022 to 04/05/2023. Generic substitution required when available.
Submitted PA for Dexcom G6 TransmitteR Key: [id number] - PA Case ID: [id number] - Rx #: [id number]  [facility] STATUS: approved 04/06/2022 to 04/06/2023    Submitted PA for Dexcom G6 Sensor    Key: Lexie Flowers - [id number]   [facility] STATUS: approved 4/6/2022-4/6/2023
NEGATIVE

## 2022-08-15 DIAGNOSIS — M05.79 RHEUMATOID ARTHRITIS INVOLVING MULTIPLE SITES WITH POSITIVE RHEUMATOID FACTOR (HCC): ICD-10-CM

## 2022-08-16 RX ORDER — ACYCLOVIR 200 MG/1
CAPSULE ORAL
Qty: 60 CAPSULE | Refills: 0 | Status: SHIPPED | OUTPATIENT
Start: 2022-08-16

## 2022-08-16 NOTE — TELEPHONE ENCOUNTER
Medication:   Requested Prescriptions     Pending Prescriptions Disp Refills    methotrexate (RHEUMATREX) 2.5 MG chemo tablet [Pharmacy Med Name: METHOTREXATE 2.5MG TABLETS - YELLOW] 35 tablet 0     Sig: TAKE 8 TABLETS BY MOUTH ONCE A WEEK, TAKE THE SAME DAY EVERY WEEK    acyclovir (ZOVIRAX) 200 MG capsule [Pharmacy Med Name: ACYCLOVIR 200MG CAPSULES] 60 capsule 0     Sig: TAKE 1 CAPSULE BY MOUTH EVERY 4 HOURS WHILE AWAKE FOR 10 DAYS        Last Filled:07/14/22  Patient Phone Number: [phone] (home)     Last appt: 7/14/2022   Next appt: Visit date not found    Last OARRS: No flowsheet data found.

## 2022-08-20 DIAGNOSIS — E11.59 HYPERTENSION ASSOCIATED WITH DIABETES (HCC): ICD-10-CM

## 2022-08-20 DIAGNOSIS — E10.29 TYPE 1 DIABETES MELLITUS WITH MICROALBUMINURIA (HCC): ICD-10-CM

## 2022-08-20 DIAGNOSIS — I15.2 HYPERTENSION ASSOCIATED WITH DIABETES (HCC): ICD-10-CM

## 2022-08-20 DIAGNOSIS — R80.9 TYPE 1 DIABETES MELLITUS WITH MICROALBUMINURIA (HCC): ICD-10-CM

## 2022-08-22 RX ORDER — LISINOPRIL 10 MG/1
10 TABLET ORAL DAILY
Qty: 90 TABLET | Refills: 1 | Status: SHIPPED | OUTPATIENT
Start: 2022-08-22

## 2022-09-07 DIAGNOSIS — E10.29 TYPE 1 DIABETES MELLITUS WITH MICROALBUMINURIA (HCC): ICD-10-CM

## 2022-09-07 DIAGNOSIS — E66.9 TYPE 1 DIABETES MELLITUS WITH OBESITY (HCC): ICD-10-CM

## 2022-09-07 DIAGNOSIS — E10.69 TYPE 1 DIABETES MELLITUS WITH OBESITY (HCC): ICD-10-CM

## 2022-09-07 DIAGNOSIS — E78.5 DYSLIPIDEMIA DUE TO TYPE 1 DIABETES MELLITUS (HCC): ICD-10-CM

## 2022-09-07 DIAGNOSIS — R80.9 TYPE 1 DIABETES MELLITUS WITH MICROALBUMINURIA (HCC): ICD-10-CM

## 2022-09-07 DIAGNOSIS — E10.69 DYSLIPIDEMIA DUE TO TYPE 1 DIABETES MELLITUS (HCC): ICD-10-CM

## 2022-09-07 RX ORDER — INSULIN LISPRO 100 [IU]/ML
INJECTION, SOLUTION INTRAVENOUS; SUBCUTANEOUS
Qty: 30 ML | Refills: 1 | OUTPATIENT
Start: 2022-09-07

## 2022-09-07 NOTE — TELEPHONE ENCOUNTER
Requested Refill:   Requested Prescriptions     Pending Prescriptions Disp Refills    insulin lispro, 1 Unit Dial, 100 UNIT/ML SOPN [Pharmacy Med Name: INSULIN LISPRO 100U/ML KWIKPEN 3ML] 30 mL 1     Sig: ADMINISTER 30 UNITS UNDER THE SKIN THREE TIMES DAILY BEFORE MEALS       Last refilled: 6/3/2022 #30 ml with 1 refill    Last Appt: 5/16/2022  Next Appt:  Follow up needed

## 2022-09-10 DIAGNOSIS — E10.69 TYPE 1 DIABETES MELLITUS WITH OBESITY (HCC): ICD-10-CM

## 2022-09-10 DIAGNOSIS — E10.69 DYSLIPIDEMIA DUE TO TYPE 1 DIABETES MELLITUS (HCC): ICD-10-CM

## 2022-09-10 DIAGNOSIS — E78.5 DYSLIPIDEMIA DUE TO TYPE 1 DIABETES MELLITUS (HCC): ICD-10-CM

## 2022-09-10 DIAGNOSIS — R80.9 TYPE 1 DIABETES MELLITUS WITH MICROALBUMINURIA (HCC): ICD-10-CM

## 2022-09-10 DIAGNOSIS — E10.29 TYPE 1 DIABETES MELLITUS WITH MICROALBUMINURIA (HCC): ICD-10-CM

## 2022-09-10 DIAGNOSIS — E66.9 TYPE 1 DIABETES MELLITUS WITH OBESITY (HCC): ICD-10-CM

## 2022-09-12 DIAGNOSIS — M05.79 RHEUMATOID ARTHRITIS INVOLVING MULTIPLE SITES WITH POSITIVE RHEUMATOID FACTOR (HCC): ICD-10-CM

## 2022-09-12 RX ORDER — INSULIN LISPRO 100 [IU]/ML
INJECTION, SOLUTION INTRAVENOUS; SUBCUTANEOUS
Qty: 30 ML | Refills: 1 | OUTPATIENT
Start: 2022-09-12

## 2022-09-12 NOTE — TELEPHONE ENCOUNTER
Requested Refill:   Requested Prescriptions     Pending Prescriptions Disp Refills    insulin lispro, 1 Unit Dial, 100 UNIT/ML SOPN [Pharmacy Med Name: INSULIN LISPRO 100U/ML KWIKPEN 3ML] 30 mL 1     Sig: ADMINISTER 30 UNITS UNDER THE SKIN THREE TIMES DAILY BEFORE MEALS       Last refilled: 6/3/2022 # 30 ml with 1 refill     Last Appt: 5/16/2022  Next Appt: needed no showed last two appointments

## 2022-09-12 NOTE — TELEPHONE ENCOUNTER
Medication:   Requested Prescriptions     Pending Prescriptions Disp Refills    methotrexate (RHEUMATREX) 2.5 MG chemo tablet [Pharmacy Med Name: METHOTREXATE 2.5MG TABLETS - YELLOW] 35 tablet 0     Sig: TAKE 8 TABLETS BY MOUTH ONCE WEEKLY ON SAME DAY EVERY WEEK        Last Filled:  08/16/22    Patient Phone Number: [phone] (home)     Last appt: 7/14/2022 Return in about 3 months (around 10/14/2022) for Diabetes. Left patient a message to schedule f/u appt  Next appt: Visit date not found    Last OARRS: No flowsheet data found.

## 2022-09-14 DIAGNOSIS — M05.79 RHEUMATOID ARTHRITIS INVOLVING MULTIPLE SITES WITH POSITIVE RHEUMATOID FACTOR (HCC): ICD-10-CM

## 2022-09-14 RX ORDER — HYDROXYCHLOROQUINE SULFATE 200 MG/1
TABLET, FILM COATED ORAL
Qty: 60 TABLET | Refills: 1 | Status: SHIPPED | OUTPATIENT
Start: 2022-09-14

## 2022-09-25 DIAGNOSIS — M05.79 RHEUMATOID ARTHRITIS INVOLVING MULTIPLE SITES WITH POSITIVE RHEUMATOID FACTOR (HCC): ICD-10-CM

## 2022-09-26 DIAGNOSIS — E78.5 DYSLIPIDEMIA DUE TO TYPE 1 DIABETES MELLITUS (HCC): ICD-10-CM

## 2022-09-26 DIAGNOSIS — E10.69 DYSLIPIDEMIA DUE TO TYPE 1 DIABETES MELLITUS (HCC): ICD-10-CM

## 2022-09-26 RX ORDER — PREDNISONE 10 MG/1
10 TABLET ORAL DAILY
Qty: 35 TABLET | Refills: 1 | OUTPATIENT
Start: 2022-09-26

## 2022-09-26 RX ORDER — EZETIMIBE 10 MG/1
10 TABLET ORAL DAILY
Qty: 30 TABLET | Refills: 3 | OUTPATIENT
Start: 2022-09-26

## 2022-09-26 NOTE — TELEPHONE ENCOUNTER
Medication:   Requested Prescriptions     Pending Prescriptions Disp Refills    predniSONE (DELTASONE) 10 MG tablet [Pharmacy Med Name: PREDNISONE 10MG** TABLETS] 35 tablet 1     Sig: TAKE 1 TABLET BY MOUTH DAILY      07/14/22  Last Filled:      Patient Phone Number: [phone] (home)     Last appt: 7/14/2022   Next appt: 10/17/2022    Last OARRS: No flowsheet data found.

## 2022-09-26 NOTE — TELEPHONE ENCOUNTER
Requested Refill:   Requested Prescriptions     Pending Prescriptions Disp Refills    ezetimibe (ZETIA) 10 MG tablet [Pharmacy Med Name: EZETIMIBE 10MG TABLETS] 30 tablet 3     Sig: TAKE 1 TABLET BY MOUTH DAILY       Last refilled: 5/16/2022 # 30 with 3 refills     Last Appt: 5/16/2022  Next Appt: Needed for refill- no showed LOV

## 2022-09-30 ENCOUNTER — TELEPHONE (OUTPATIENT)
Dept: PRIMARY CARE CLINIC | Age: 37
End: 2022-09-30

## 2022-10-03 DIAGNOSIS — E10.69 TYPE 1 DIABETES MELLITUS WITH OBESITY (HCC): ICD-10-CM

## 2022-10-03 DIAGNOSIS — E10.69 DYSLIPIDEMIA DUE TO TYPE 1 DIABETES MELLITUS (HCC): ICD-10-CM

## 2022-10-03 DIAGNOSIS — E66.9 TYPE 1 DIABETES MELLITUS WITH OBESITY (HCC): ICD-10-CM

## 2022-10-03 DIAGNOSIS — M05.79 RHEUMATOID ARTHRITIS INVOLVING MULTIPLE SITES WITH POSITIVE RHEUMATOID FACTOR (HCC): ICD-10-CM

## 2022-10-03 DIAGNOSIS — E10.29 TYPE 1 DIABETES MELLITUS WITH MICROALBUMINURIA (HCC): ICD-10-CM

## 2022-10-03 DIAGNOSIS — R80.9 TYPE 1 DIABETES MELLITUS WITH MICROALBUMINURIA (HCC): ICD-10-CM

## 2022-10-03 DIAGNOSIS — E78.5 DYSLIPIDEMIA DUE TO TYPE 1 DIABETES MELLITUS (HCC): ICD-10-CM

## 2022-10-03 RX ORDER — PREDNISONE 10 MG/1
10 TABLET ORAL DAILY
Qty: 35 TABLET | Refills: 1 | OUTPATIENT
Start: 2022-10-03

## 2022-10-03 RX ORDER — ROSUVASTATIN CALCIUM 10 MG/1
10 TABLET, COATED ORAL DAILY
Qty: 90 TABLET | Refills: 1 | OUTPATIENT
Start: 2022-10-03

## 2022-10-03 RX ORDER — PREDNISONE 10 MG/1
10 TABLET ORAL DAILY
Qty: 35 TABLET | Refills: 1 | Status: SHIPPED | OUTPATIENT
Start: 2022-10-03

## 2022-10-03 NOTE — TELEPHONE ENCOUNTER
This patient should not be on this medication long-term.   When I wrote it for her originally it was an attempt to bridge her until she could see a rheumatologist.  Does she have an appointment with a rheumatologist?

## 2022-10-03 NOTE — TELEPHONE ENCOUNTER
Medication:   Requested Prescriptions     Pending Prescriptions Disp Refills    predniSONE (DELTASONE) 10 MG tablet 35 tablet 1     Sig: Take 1 tablet by mouth daily        Last Filled:  07/04/22 Pt states she has not gotten in to to [address] [place] [address]. And that she has been taking this for over 1 year now     Patient Phone Number: [phone] (home)     Last appt: 7/14/2022   Next appt: 10/17/2022    Last OARRS: No flowsheet data found.

## 2022-10-03 NOTE — TELEPHONE ENCOUNTER
Requested Refill:   Requested Prescriptions     Pending Prescriptions Disp Refills    rosuvastatin (CRESTOR) 10 MG tablet [Pharmacy Med Name: ROSUVASTATIN 10MG TABLETS] 90 tablet 1     Sig: TAKE 1 TABLET BY MOUTH DAILY       Last refilled: 4/4/2022 #90 with 1 refill     Last Appt: 5/16/2022  Next Appt: needed- no showed LOV

## 2022-10-03 NOTE — TELEPHONE ENCOUNTER
That referral was faxed back in July I just called their office. They stated due to high needs to get in they now have a wait list. Once they get to her on the list they will reach out to make an appointment. I did how ever give pt the information to  Ashley Larsen MD office I am calling now to see if she is able to get in for an appointment.

## 2022-10-03 NOTE — TELEPHONE ENCOUNTER
Well I tried to give that office a call and make her an appointment she can not see any ra doctor within [facility] she has been fired from two

## 2022-10-03 NOTE — TELEPHONE ENCOUNTER
She said she does not she missed it an was fired I tried to explain to her she should not be on this medication this long and her respond was that she has been on it for 1 year plus

## 2022-10-04 DIAGNOSIS — E10.29 TYPE 1 DIABETES MELLITUS WITH MICROALBUMINURIA (HCC): ICD-10-CM

## 2022-10-04 DIAGNOSIS — E10.69 TYPE 1 DIABETES MELLITUS WITH OBESITY (HCC): ICD-10-CM

## 2022-10-04 DIAGNOSIS — R80.9 TYPE 1 DIABETES MELLITUS WITH MICROALBUMINURIA (HCC): ICD-10-CM

## 2022-10-04 DIAGNOSIS — E66.9 TYPE 1 DIABETES MELLITUS WITH OBESITY (HCC): ICD-10-CM

## 2022-10-04 DIAGNOSIS — E78.5 DYSLIPIDEMIA DUE TO TYPE 1 DIABETES MELLITUS (HCC): ICD-10-CM

## 2022-10-04 DIAGNOSIS — E10.69 DYSLIPIDEMIA DUE TO TYPE 1 DIABETES MELLITUS (HCC): ICD-10-CM

## 2022-10-04 RX ORDER — BLOOD-GLUCOSE SENSOR
EACH MISCELLANEOUS
Qty: 3 EACH | Refills: 5 | OUTPATIENT
Start: 2022-10-04

## 2022-10-12 DIAGNOSIS — E10.69 DYSLIPIDEMIA DUE TO TYPE 1 DIABETES MELLITUS (HCC): ICD-10-CM

## 2022-10-12 DIAGNOSIS — E66.9 TYPE 1 DIABETES MELLITUS WITH OBESITY (HCC): ICD-10-CM

## 2022-10-12 DIAGNOSIS — E10.69 TYPE 1 DIABETES MELLITUS WITH OBESITY (HCC): ICD-10-CM

## 2022-10-12 DIAGNOSIS — E10.29 TYPE 1 DIABETES MELLITUS WITH MICROALBUMINURIA (HCC): ICD-10-CM

## 2022-10-12 DIAGNOSIS — R80.9 TYPE 1 DIABETES MELLITUS WITH MICROALBUMINURIA (HCC): ICD-10-CM

## 2022-10-12 DIAGNOSIS — E78.5 DYSLIPIDEMIA DUE TO TYPE 1 DIABETES MELLITUS (HCC): ICD-10-CM

## 2022-10-12 RX ORDER — BLOOD-GLUCOSE SENSOR
EACH MISCELLANEOUS
Qty: 3 EACH | Refills: 5 | OUTPATIENT
Start: 2022-10-12

## 2022-10-16 DIAGNOSIS — E10.69 TYPE 1 DIABETES MELLITUS WITH OBESITY (HCC): ICD-10-CM

## 2022-10-16 DIAGNOSIS — E78.5 DYSLIPIDEMIA DUE TO TYPE 1 DIABETES MELLITUS (HCC): ICD-10-CM

## 2022-10-16 DIAGNOSIS — R80.9 TYPE 1 DIABETES MELLITUS WITH MICROALBUMINURIA (HCC): ICD-10-CM

## 2022-10-16 DIAGNOSIS — E66.9 TYPE 1 DIABETES MELLITUS WITH OBESITY (HCC): ICD-10-CM

## 2022-10-16 DIAGNOSIS — E10.29 TYPE 1 DIABETES MELLITUS WITH MICROALBUMINURIA (HCC): ICD-10-CM

## 2022-10-16 DIAGNOSIS — E10.69 DYSLIPIDEMIA DUE TO TYPE 1 DIABETES MELLITUS (HCC): ICD-10-CM

## 2022-10-16 NOTE — PATIENT INSTRUCTIONS
Learning About Diabetes and Exercise  Can you exercise if you have diabetes? When you have diabetes, it's important to get regular exercise. This helps control your blood sugar level. You can still play sports, run, ride a bike, go swimming, and do other activities when you have diabetes. How can exercise help you manage diabetes? Your body turns the food you eat into glucose, a type of sugar. You need this sugar for energy. When you have diabetes, the sugar builds up in your blood. But when you exercise, your body uses sugar. This helps keep it from building up in your blood and results in lower blood sugar and better control of diabetes. Exercise may help you in other ways too. It can help you reach and stay at a healthy weight. It also helps improve blood pressure and cholesterol, which can reduce the risk of heart disease. Exercise can make you feel stronger and happier. It can help you relax and sleep better, and give you confidence in other things you do. How can you exercise safely? Before you start a new exercise program, talk to your doctor about how and when to exercise. You may need to have a medical exam and tests before you begin. Some types of exercise can be harmful if your diabetes is causing other problems, such as problems with your feet. Your doctor can tell you what types of exercise are good choices for you. These tips can help you exercise safely when you have diabetes. If your diabetes is controlled by diet or medicine that doesn't lower your blood sugar, you don't need to eat a snack before you exercise. Check your blood sugar before you exercise. And be careful about what you eat. If your blood sugar is less than 100, eat a carbohydrate snack before you exercise. Be careful when you exercise if your blood sugar is over 300. High blood sugar can make you dehydrated. And that makes your blood sugar levels go even higher.  If you have ketones in your blood or urine and your blood sugar is over 300, do not exercise. Don't try to do too much at first. Build up your exercise program bit by bit. Try to get at least 30 minutes of exercise on most days of the week. Walking is a good choice. You also may want to do other activities, such as riding a bike or swimming. You might try running or gardening. Try to include muscle-strengthening exercises at least 2 times a week. These exercises include push-ups and weight training. You can also use rubber tubing or stretch bands. You stretch or pull the tubing or band to build muscle strength. If you want to exercise more, slowly increase how hard or long you exercise. You may get symptoms of low blood sugar during exercise or up to 24 hours later. Some symptoms of low blood sugar, such as sweating, a fast heartbeat, or feeling tired, can be confused with what can happen anytime you exercise. Other symptoms may include feeling anxious, dizzy, weak, or shaky. So it's a good idea to check your blood sugar again. You can treat low blood sugar by eating or drinking something that has 15 grams of carbohydrate. These should be quick-sugar foods. Quick-sugar foods such as glucose tablets, table sugar, honey, fruit juice, regular (not diet) soda pop, or hard candy can help raise blood sugar. Check your blood sugar level again 15 minutes after having a quick-sugar food to make sure your level is getting back to your target range. Drink plenty of water before, during, and after you exercise. Wear medical alert jewelry that says you have diabetes. You can buy this at most drugstores. Pay attention to your body. If you are used to exercise and notice that you can't do as much as usual, talk to your doctor. Follow-up care is a key part of your treatment and safety. Be sure to make and go to all appointments, and call your doctor if you are having problems. It's also a good idea to know your test results and keep a list of the medicines you take.   Where can you learn more? Go to [email or web address] and sign in to your [employer] account. Enter X741 in the [facility] box to learn more about \"Learning About Diabetes and Exercise. \"     If you do not have an account, please click on the \"Sign Up Now\" link. Current as of: December 20, 2019               Content Version: 12.5  © 3749-9107 Healthwise, Incorporated. Care instructions adapted under license by [facility] ([facility]). If you have questions about a medical condition or this instruction, always ask your healthcare professional. [address] any warranty or liability for your use of this information.

## 2022-10-16 NOTE — PROGRESS NOTES
10/17/2022     Jenny Liriano (:  1985) is a 40 y.o. female, here for evaluation of the following medical concerns:    HPI  {Visit Chronic CHP (Optional):68979}    Review of Systems    Prior to Visit Medications    Medication Sig Taking? Authorizing Provider   predniSONE (DELTASONE) 10 MG tablet Take 1 tablet by mouth daily  Isidro Garden,    hydroxychloroquine (PLAQUENIL) 200 MG tablet TAKE 1 TABLET BY MOUTH TWICE DAILY  [facility],    methotrexate (RHEUMATREX) 2.5 MG chemo tablet TAKE 8 TABLETS BY MOUTH ONCE WEEKLY ON SAME DAY EVERY WEEK  Isidro Garden,    lisinopril (PRINIVIL;ZESTRIL) 10 MG tablet TAKE 1 TABLET BY MOUTH DAILY  [facility]   acyclovir (ZOVIRAX) 200 MG capsule TAKE 1 CAPSULE BY MOUTH EVERY 4 HOURS WHILE AWAKE FOR 10 DAYS  Meg Napier MD   folic acid (FOLVITE) 1 MG tablet Take 1 tab po daily. Isidro Henry,    TRUE METRIX BLOOD GLUCOSE TEST strip USE 1 STRIP TO TEST BLOOD SUGAR FOUR TIMES DAILY AS DIRECTED  Isidro GardenDO   insulin lispro, 1 Unit Dial, 100 UNIT/ML SOPN INJECT 30 UNITS UNDER THE SKIN THREE TIMES DAILY BEFORE MEALS  Donovan Dunn MD   insulin glargine (LANTUS SOLOSTAR) 100 UNIT/ML injection pen 45 units bid  Donovan Dunn MD   ezetimibe (ZETIA) 10 MG tablet Take 1 tablet by mouth daily  Donovan Dunn MD   Blood Glucose Monitoring Suppl (TRUE METRIX AIR GLUCOSE METER) CRYSTAL by Does not apply route  Historical Provider, MD   Continuous Blood Gluc  (DEXCOM G6 ) CRSYTAL Use for personal CGM  Donovan Dunn MD   Continuous Blood Gluc Sensor (DEXCOM G6 SENSOR) MISC Change every 10 days. Use for personal CGMS. On Multiple insulin shots, checks blood sugars 4 times per day and requires frequent insulin adjustment.   Donovan Dunn MD   Continuous Blood Gluc Transmit (DEXCOM G6 TRANSMITTER) MISC Change every 3 months  Donovan Dunn MD   Glucagon, rDNA, (GLUCAGON EMERGENCY) 1 MG KIT For severely low sugar  Crystal Corbett MD   rosuvastatin (CRESTOR) 10 MG tablet Take 1 tablet by mouth daily  Crystal Corbett MD   insulin lispro (HUMALOG) 100 UNIT/ML injection vial ADMINISTER 15 UNITS UNDER THE SKIN THREE TIMES DAILY BEFORE MEALS  Patient taking differently: ADMINISTER 30 UNITS UNDER THE SKIN THREE TIMES DAILY BEFORE MEALS  Vicente Song DO   Lancets [address] [facility] 1 each by Does not apply route 2 times daily  Vicente Song DO   Insulin Syringe-Needle U-100 (Lucas Chromo INS SYR .3CC/29G) 29G X 1/2\" 0.3 ML MISC 1 each by Does not apply route daily  Vicente Song DO        Social History     Tobacco Use    Smoking status: Never    Smokeless tobacco: Never   Substance Use Topics    Alcohol use: No        There were no vitals filed for this visit. Estimated body mass index is 47.29 kg/m² as calculated from the following:    Height as of 7/14/22: 5' 5\" (1.651 m). Weight as of 7/14/22: 284 lb 3.2 oz (128.9 kg). Physical Exam    ASSESSMENT/PLAN:  1. Type 1 diabetes mellitus with microalbuminuria (HCC)  ***    2. Hypertension associated with diabetes (HCC)  ***    3. Type 1 diabetes mellitus with hyperlipidemia (HCC)  ***    4. Type 1 diabetes mellitus with obesity (HCC)  ***    5. Need for prophylactic vaccination against Streptococcus pneumoniae (pneumococcus)  ***    No follow-ups on file. An electronic signature was used to authenticate this note.     --Vicente Song DO on 10/16/2022 at 8:59 AM

## 2022-10-17 ENCOUNTER — OFFICE VISIT (OUTPATIENT)
Dept: PRIMARY CARE CLINIC | Age: 37
End: 2022-10-17
Payer: COMMERCIAL

## 2022-10-17 VITALS
DIASTOLIC BLOOD PRESSURE: 78 MMHG | HEART RATE: 79 BPM | WEIGHT: 289.2 LBS | SYSTOLIC BLOOD PRESSURE: 120 MMHG | TEMPERATURE: 97.9 F | BODY MASS INDEX: 48.13 KG/M2

## 2022-10-17 DIAGNOSIS — I15.2 HYPERTENSION ASSOCIATED WITH DIABETES (HCC): ICD-10-CM

## 2022-10-17 DIAGNOSIS — Z23 NEED FOR PROPHYLACTIC VACCINATION AGAINST STREPTOCOCCUS PNEUMONIAE (PNEUMOCOCCUS): ICD-10-CM

## 2022-10-17 DIAGNOSIS — E10.69 TYPE 1 DIABETES MELLITUS WITH HYPERLIPIDEMIA (HCC): ICD-10-CM

## 2022-10-17 DIAGNOSIS — E10.29 TYPE 1 DIABETES MELLITUS WITH MICROALBUMINURIA (HCC): Primary | ICD-10-CM

## 2022-10-17 DIAGNOSIS — R80.9 TYPE 1 DIABETES MELLITUS WITH MICROALBUMINURIA (HCC): Primary | ICD-10-CM

## 2022-10-17 DIAGNOSIS — M05.79 RHEUMATOID ARTHRITIS INVOLVING MULTIPLE SITES WITH POSITIVE RHEUMATOID FACTOR (HCC): ICD-10-CM

## 2022-10-17 DIAGNOSIS — E66.9 TYPE 1 DIABETES MELLITUS WITH OBESITY (HCC): ICD-10-CM

## 2022-10-17 DIAGNOSIS — E78.5 TYPE 1 DIABETES MELLITUS WITH HYPERLIPIDEMIA (HCC): ICD-10-CM

## 2022-10-17 DIAGNOSIS — E11.59 HYPERTENSION ASSOCIATED WITH DIABETES (HCC): ICD-10-CM

## 2022-10-17 DIAGNOSIS — E10.69 TYPE 1 DIABETES MELLITUS WITH OBESITY (HCC): ICD-10-CM

## 2022-10-17 LAB — HBA1C MFR BLD: 8.9 %

## 2022-10-17 PROCEDURE — 99214 OFFICE O/P EST MOD 30 MIN: CPT | Performed by: STUDENT IN AN ORGANIZED HEALTH CARE EDUCATION/TRAINING PROGRAM

## 2022-10-17 PROCEDURE — G8484 FLU IMMUNIZE NO ADMIN: HCPCS | Performed by: STUDENT IN AN ORGANIZED HEALTH CARE EDUCATION/TRAINING PROGRAM

## 2022-10-17 PROCEDURE — 83036 HEMOGLOBIN GLYCOSYLATED A1C: CPT | Performed by: STUDENT IN AN ORGANIZED HEALTH CARE EDUCATION/TRAINING PROGRAM

## 2022-10-17 PROCEDURE — G8427 DOCREV CUR MEDS BY ELIG CLIN: HCPCS | Performed by: STUDENT IN AN ORGANIZED HEALTH CARE EDUCATION/TRAINING PROGRAM

## 2022-10-17 PROCEDURE — 2022F DILAT RTA XM EVC RTNOPTHY: CPT | Performed by: STUDENT IN AN ORGANIZED HEALTH CARE EDUCATION/TRAINING PROGRAM

## 2022-10-17 PROCEDURE — 1036F TOBACCO NON-USER: CPT | Performed by: STUDENT IN AN ORGANIZED HEALTH CARE EDUCATION/TRAINING PROGRAM

## 2022-10-17 PROCEDURE — G8417 CALC BMI ABV UP PARAM F/U: HCPCS | Performed by: STUDENT IN AN ORGANIZED HEALTH CARE EDUCATION/TRAINING PROGRAM

## 2022-10-17 PROCEDURE — 3052F HG A1C>EQUAL 8.0%<EQUAL 9.0%: CPT | Performed by: STUDENT IN AN ORGANIZED HEALTH CARE EDUCATION/TRAINING PROGRAM

## 2022-10-17 RX ORDER — BLOOD-GLUCOSE TRANSMITTER
EACH MISCELLANEOUS
Qty: 1 EACH | Refills: 3 | OUTPATIENT
Start: 2022-10-17

## 2022-10-17 RX ORDER — INSULIN GLARGINE 100 [IU]/ML
100 INJECTION, SOLUTION SUBCUTANEOUS NIGHTLY
Qty: 30 ML | Refills: 5 | Status: SHIPPED | OUTPATIENT
Start: 2022-10-17 | End: 2022-10-17

## 2022-10-17 RX ORDER — BLOOD-GLUCOSE SENSOR
EACH MISCELLANEOUS
Qty: 3 EACH | Refills: 5 | Status: SHIPPED | OUTPATIENT
Start: 2022-10-17

## 2022-10-17 RX ORDER — SYRINGE-NEEDLE,INSULIN,0.5 ML 31 GX5/16"
1 SYRINGE, EMPTY DISPOSABLE MISCELLANEOUS DAILY
Qty: 300 EACH | Refills: 3 | Status: SHIPPED | OUTPATIENT
Start: 2022-10-17

## 2022-10-17 RX ORDER — INSULIN GLARGINE 100 [IU]/ML
100 INJECTION, SOLUTION SUBCUTANEOUS NIGHTLY
Qty: 30 ML | Refills: 5 | Status: SHIPPED | OUTPATIENT
Start: 2022-10-17 | End: 2022-11-16

## 2022-10-17 ASSESSMENT — ENCOUNTER SYMPTOMS
ABDOMINAL DISTENTION: 0
NAUSEA: 0
CHEST TIGHTNESS: 0
SHORTNESS OF BREATH: 0
ABDOMINAL PAIN: 0
DIARRHEA: 0

## 2022-10-17 NOTE — PROGRESS NOTES
10/17/2022     Emanuel Mckinney (:  1985) is a 40 y.o. female, here for evaluation of the following medical concerns:    HPI  Diabetes Mellitus Type 2: Current symptoms/problems include none. Medication compliance:  compliant most of the time  Diabetic diet compliance:  more stress, eating worse;  Weight trend: increasing  Current exercise: no regular exercise  Barriers: none    Home blood sugar records: fasting range: 200-240, postprandial range: > 200  Any episodes of hypoglycemia? no  Eye exam current (within one year): no   reports that she has never smoked. She has never used smokeless tobacco.   Daily Aspirin? No    Lab Results   Component Value Date    LABA1C 8.9 10/17/2022    LABA1C 7.7 03/10/2022    LABA1C 7.9 2021     Lab Results   Component Value Date    LABMICR 8.30 (H) 03/10/2022    CREATININE <0.5 (L) 03/10/2022     Lab Results   Component Value Date    ALT 30 03/10/2022    AST 21 03/10/2022     Lab Results   Component Value Date    HDL 39 (L) 03/10/2022    LDLCALC 111 (H) 03/10/2022        Review of Systems   Constitutional:  Negative for activity change, appetite change, fatigue and unexpected weight change. Eyes:  Negative for visual disturbance. Respiratory:  Negative for chest tightness and shortness of breath. Gastrointestinal:  Negative for abdominal distention, abdominal pain, diarrhea and nausea. Endocrine: Negative for polydipsia, polyphagia and polyuria. Genitourinary:  Negative for decreased urine volume, dysuria and frequency. Musculoskeletal:  Negative for arthralgias, gait problem and myalgias. Skin:  Negative for rash and wound. Neurological:  Negative for dizziness, weakness, light-headedness and numbness. Hematological:  Does not bruise/bleed easily. Prior to Visit Medications    Medication Sig Taking? Authorizing Provider   Continuous Blood Gluc Sensor (DEXCOM G6 SENSOR) MISC Change every 10 days. Use for personal CGMS.  On Multiple insulin shots, checks blood sugars 4 times per day and requires frequent insulin adjustment. Yes Brittney Muñoz DO   insulin glargine (LANTUS SOLOSTAR) 100 UNIT/ML injection pen Inject 100 Units into the skin nightly 45 units bid Yes Brittney Muñoz DO   Insulin Syringe-Needle U-100 ([employer] INS SYR .3CC/29G) 29G X 1/2\" 0.3 ML MISC 1 each by Does not apply route daily Yes Brittney Muñoz DO   predniSONE (DELTASONE) 10 MG tablet Take 1 tablet by mouth daily Yes Brittney Muñoz DO   hydroxychloroquine (PLAQUENIL) 200 MG tablet TAKE 1 TABLET BY MOUTH TWICE DAILY Yes Brittney Muñoz DO   methotrexate (RHEUMATREX) 2.5 MG chemo tablet TAKE 8 TABLETS BY MOUTH ONCE WEEKLY ON SAME DAY EVERY WEEK Yes Brittney Muñoz DO   lisinopril (PRINIVIL;ZESTRIL) 10 MG tablet TAKE 1 TABLET BY MOUTH DAILY Yes Brittney Muñoz DO   acyclovir (ZOVIRAX) 200 MG capsule TAKE 1 CAPSULE BY MOUTH EVERY 4 HOURS WHILE AWAKE FOR 10 DAYS Yes Mitzy Holley MD   folic acid (FOLVITE) 1 MG tablet Take 1 tab po daily.  Yes Brittney Muñoz DO   TRUE METRIX BLOOD GLUCOSE TEST strip USE 1 STRIP TO TEST BLOOD SUGAR FOUR TIMES DAILY AS DIRECTED Yes Brittney Muñoz DO   insulin lispro, 1 Unit Dial, 100 UNIT/ML SOPN INJECT 30 UNITS UNDER THE SKIN THREE TIMES DAILY BEFORE MEALS Yes Zahira Valentino MD   ezetimibe (ZETIA) 10 MG tablet Take 1 tablet by mouth daily Yes Zahira Valentino MD   Blood Glucose Monitoring Suppl (TRUE METRIX AIR GLUCOSE METER) CRYSTAL by Does not apply route Yes Historical Provider, MD   Continuous Blood Gluc  (DEXCOM G6 ) CRYSTAL Use for personal CGM Yes Zahira Valentino MD   Continuous Blood Gluc Transmit (DEXCOM G6 TRANSMITTER) MISC Change every 3 months Yes Zahira Valentino MD   Glucagon, rDNA, (GLUCAGON EMERGENCY) 1 MG KIT For severely low sugar Yes Zahira Valentino MD   rosuvastatin (CRESTOR) 10 MG tablet Take 1 tablet by mouth daily Yes Zahira Valentino MD   insulin lispro (HUMALOG) 100 UNIT/ML injection vial ADMINISTER 15 UNITS UNDER THE SKIN THREE TIMES DAILY BEFORE MEALS  Patient taking differently: ADMINISTER 30 UNITS UNDER THE SKIN THREE TIMES DAILY BEFORE MEALS Yes Alena Issa,    Lancets MISC 1 each by Does not apply route 2 times daily Yes Alena Ray, DO        Social History     Tobacco Use    Smoking status: Never    Smokeless tobacco: Never   Substance Use Topics    Alcohol use: No        Vitals:    10/17/22 1013   BP: 120/78   Pulse: 79   Temp: 97.9 °F (36.6 °C)   TempSrc: Temporal   Weight: 289 lb 3.2 oz (131.2 kg)     Estimated body mass index is 48.13 kg/m² as calculated from the following:    Height as of 7/14/22: 5' 5\" (1.651 m). Weight as of this encounter: 289 lb 3.2 oz (131.2 kg). Physical Exam  Vitals reviewed. Constitutional:       Appearance: Normal appearance. She is obese. HENT:      Head: Normocephalic and atraumatic. Right Ear: Tympanic membrane, ear canal and external ear normal.      Left Ear: Tympanic membrane, ear canal and external ear normal.      Nose: Nose normal.   Eyes:      Extraocular Movements: Extraocular movements intact. Conjunctiva/sclera: Conjunctivae normal.   Cardiovascular:      Rate and Rhythm: Normal rate and regular rhythm. Pulses: Normal pulses. Heart sounds: Normal heart sounds. Pulmonary:      Effort: Pulmonary effort is normal.      Breath sounds: Normal breath sounds. Abdominal:      General: Abdomen is flat. Bowel sounds are normal.      Palpations: Abdomen is soft. Musculoskeletal:         General: No deformity. Cervical back: Normal range of motion and neck supple. Skin:     General: Skin is warm and dry. Capillary Refill: Capillary refill takes less than 2 seconds. Findings: No lesion or rash. Neurological:      General: No focal deficit present. Mental Status: She is alert and oriented to person, place, and time. Mental status is at baseline. Sensory: No sensory deficit.    Psychiatric:         Mood and Affect: Mood normal.         Behavior: Behavior normal.         Thought Content: Thought content normal.         Judgment: Judgment normal.       ASSESSMENT/PLAN:  Romy Bryant was seen today for diabetes and hyperlipidemia. Diagnoses and all orders for this visit:    Type 1 diabetes mellitus with microalbuminuria ([facility] [address]): A1C from 7.7-8.9. Likely secondary to restarting her prednisone for her RA. Working on getting in her rheumatologist.  Increasing her basal insulin from . Follow-up 4 to 6 weeks. -     POCT glycosylated hemoglobin (Hb A1C)  -     Continuous Blood Gluc Sensor (DEXCOM G6 SENSOR) MISC; Change every 10 days. Use for personal CGMS. On Multiple insulin shots, checks blood sugars 4 times per day and requires frequent insulin adjustment. -     insulin glargine (LANTUS SOLOSTAR) 100 UNIT/ML injection pen; Inject 100 Units into the skin nightly 45 units bid  -     Insulin Syringe-Needle U-100 ([employer] INS SYR .3CC/29G) 29G X 1/2\" 0.3 ML MISC; 1 each by Does not apply route daily    Hypertension associated with diabetes ([facility] [address]): Blood pressure goal today. Tolerating current regimen well without side effects. Type 1 diabetes mellitus with hyperlipidemia ([facility] [address]): Compliant with statin. Get information on the Mediterranean diet. Type 1 diabetes mellitus with obesity [facility]): Reviewed appropriate lifestyle modification with patient. -     insulin glargine (LANTUS SOLOSTAR) 100 UNIT/ML injection pen; Inject 100 Units into the skin nightly 45 units bid    Rheumatoid arthritis involving multiple sites with positive rheumatoid factor ([facility] [address]): A1c is trending up likely secondary to restarting her prednisone. She is still having some joint pain although much improved. This makes it difficult for me to wean the prednisone down. Cannot get into rheumatology until April. Working on finding a sooner alternative. -     External Referral To Rheumatology    Return in about 6 weeks (around 11/28/2022) for Diabetes.     An electronic signature was used to authenticate this note.     --Alena Issa DO on 10/17/2022 at 11:22 AM

## 2022-10-22 DIAGNOSIS — M05.79 RHEUMATOID ARTHRITIS INVOLVING MULTIPLE SITES WITH POSITIVE RHEUMATOID FACTOR (HCC): ICD-10-CM

## 2022-10-24 NOTE — TELEPHONE ENCOUNTER
Medication:   Requested Prescriptions     Pending Prescriptions Disp Refills    methotrexate (RHEUMATREX) 2.5 MG chemo tablet [Pharmacy Med Name: METHOTREXATE 2.5MG TABLETS - YELLOW] 35 tablet 0     Sig: TAKE 8 TABLETS BY MOUTH ONCE WEEKLY, ON SAME DAY EVERY WEEK       Last Filled:      Patient Phone Number: [phone] (home)     Last appt: 10/17/2022   Next appt: Visit date not found  Return in about 3 months (around 10/14/2022) for Diabetes.   Last PSA: No results found for: PSA

## 2022-11-09 RX ORDER — ACYCLOVIR 200 MG/1
CAPSULE ORAL
Qty: 60 CAPSULE | Refills: 0 | Status: SHIPPED | OUTPATIENT
Start: 2022-11-09

## 2022-11-09 NOTE — TELEPHONE ENCOUNTER
Medication:   Requested Prescriptions     Pending Prescriptions Disp Refills    acyclovir (ZOVIRAX) 200 MG capsule [Pharmacy Med Name: ACYCLOVIR 200MG CAPSULES] 60 capsule 0     Sig: TAKE 1 CAPSULE BY MOUTH EVERY 4 HOURS WHILE AWAKE FOR 10 DAYS        Last Filled:  08/16/22    Patient Phone Number: [phone] (home)     Last appt: 10/17/2022 Return in about 6 weeks (around 11/28/2022) for Diabetes. Next appt: Visit date not found    Last OARRS: No flowsheet data found. Medication:   Requested Prescriptions     Pending Prescriptions Disp Refills    acyclovir (ZOVIRAX) 200 MG capsule [Pharmacy Med Name: ACYCLOVIR 200MG CAPSULES] 60 capsule 0     Sig: TAKE 1 CAPSULE BY MOUTH EVERY 4 HOURS WHILE AWAKE FOR 10 DAYS        Last Filled:      Patient Phone Number: [phone] (home)     Last appt: 10/17/2022   Next appt: Visit date not found    Last OARRS: No flowsheet data found.

## 2022-12-08 DIAGNOSIS — M05.79 RHEUMATOID ARTHRITIS INVOLVING MULTIPLE SITES WITH POSITIVE RHEUMATOID FACTOR (HCC): ICD-10-CM

## 2022-12-08 NOTE — TELEPHONE ENCOUNTER
Medication:   Requested Prescriptions     Pending Prescriptions Disp Refills    methotrexate (RHEUMATREX) 2.5 MG chemo tablet [Pharmacy Med Name: METHOTREXATE 2.5MG TABLETS - YELLOW] 35 tablet 0     Sig: TAKE 8 TABLETS BY MOUTH ONCE WEEKLY, ON THE SAME DAY EVERY WEEK        Last Filled:  10/24/22    Patient Phone Number: [phone] (home)     Last appt: 10/17/2022 Return in about 6 weeks (around 11/28/2022) for Diabetes. Next appt: Visit date not found    Last OARRS: No flowsheet data found.

## 2022-12-14 DIAGNOSIS — M05.79 RHEUMATOID ARTHRITIS INVOLVING MULTIPLE SITES WITH POSITIVE RHEUMATOID FACTOR (HCC): ICD-10-CM

## 2022-12-14 RX ORDER — PREDNISONE 10 MG/1
10 TABLET ORAL DAILY
Qty: 35 TABLET | Refills: 1 | Status: SHIPPED | OUTPATIENT
Start: 2022-12-14

## 2022-12-14 NOTE — TELEPHONE ENCOUNTER
Medication:   Requested Prescriptions     Pending Prescriptions Disp Refills    predniSONE (DELTASONE) 10 MG tablet [Pharmacy Med Name: PREDNISONE 10MG** TABLETS] 35 tablet 1     Sig: TAKE 1 TABLET BY MOUTH DAILY        Last Filled:  10/03/22    Patient Phone Number: [phone] (home)     Last appt: 10/17/2022 Return in about 6 weeks (around 11/28/2022) for Diabetes  Next appt: Visit date not found    Last OARRS: No flowsheet data found.

## 2022-12-19 NOTE — TELEPHONE ENCOUNTER
Medication:   Requested Prescriptions     Pending Prescriptions Disp Refills    acyclovir (ZOVIRAX) 200 MG capsule [Pharmacy Med Name: ACYCLOVIR 200MG CAPSULES] 60 capsule 0     Sig: TAKE 1 CAPSULE BY MOUTH EVERY 4 HOURS WHILE AWAKE FOR 10 DAYS        Last Filled:  11/9/22    Patient Phone Number: [phone] (home)     Last appt: 10/17/2022   Next appt: Visit date not found    Last OARRS: No flowsheet data found.

## 2022-12-20 RX ORDER — ACYCLOVIR 200 MG/1
CAPSULE ORAL
Qty: 60 CAPSULE | Refills: 0 | Status: SHIPPED | OUTPATIENT
Start: 2022-12-20

## 2022-12-29 ENCOUNTER — HOSPITAL ENCOUNTER (OUTPATIENT)
Dept: GENERAL RADIOLOGY | Age: 37
Discharge: HOME | End: 2022-12-29
Payer: COMMERCIAL

## 2022-12-29 ENCOUNTER — HOSPITAL ENCOUNTER (OUTPATIENT)
Age: 37
Discharge: HOME | End: 2022-12-29
Payer: COMMERCIAL

## 2022-12-29 ENCOUNTER — OFFICE VISIT (OUTPATIENT)
Dept: PRIMARY CARE CLINIC | Age: 37
End: 2022-12-29
Payer: COMMERCIAL

## 2022-12-29 VITALS
TEMPERATURE: 97.8 F | DIASTOLIC BLOOD PRESSURE: 70 MMHG | OXYGEN SATURATION: 97 % | HEIGHT: 65 IN | BODY MASS INDEX: 48.32 KG/M2 | WEIGHT: 290 LBS | SYSTOLIC BLOOD PRESSURE: 126 MMHG | HEART RATE: 72 BPM

## 2022-12-29 DIAGNOSIS — R06.02 SHORTNESS OF BREATH: ICD-10-CM

## 2022-12-29 DIAGNOSIS — T78.40XA ALLERGIC REACTION TO DRUG, INITIAL ENCOUNTER: Primary | ICD-10-CM

## 2022-12-29 PROCEDURE — G8417 CALC BMI ABV UP PARAM F/U: HCPCS | Performed by: STUDENT IN AN ORGANIZED HEALTH CARE EDUCATION/TRAINING PROGRAM

## 2022-12-29 PROCEDURE — 3074F SYST BP LT 130 MM HG: CPT | Performed by: STUDENT IN AN ORGANIZED HEALTH CARE EDUCATION/TRAINING PROGRAM

## 2022-12-29 PROCEDURE — G8484 FLU IMMUNIZE NO ADMIN: HCPCS | Performed by: STUDENT IN AN ORGANIZED HEALTH CARE EDUCATION/TRAINING PROGRAM

## 2022-12-29 PROCEDURE — 71046 X-RAY EXAM CHEST 2 VIEWS: CPT

## 2022-12-29 PROCEDURE — G8427 DOCREV CUR MEDS BY ELIG CLIN: HCPCS | Performed by: STUDENT IN AN ORGANIZED HEALTH CARE EDUCATION/TRAINING PROGRAM

## 2022-12-29 PROCEDURE — 99214 OFFICE O/P EST MOD 30 MIN: CPT | Performed by: STUDENT IN AN ORGANIZED HEALTH CARE EDUCATION/TRAINING PROGRAM

## 2022-12-29 PROCEDURE — 3078F DIAST BP <80 MM HG: CPT | Performed by: STUDENT IN AN ORGANIZED HEALTH CARE EDUCATION/TRAINING PROGRAM

## 2022-12-29 PROCEDURE — 1036F TOBACCO NON-USER: CPT | Performed by: STUDENT IN AN ORGANIZED HEALTH CARE EDUCATION/TRAINING PROGRAM

## 2022-12-29 RX ORDER — METHYLPREDNISOLONE ACETATE 40 MG/ML
40 INJECTION, SUSPENSION INTRA-ARTICULAR; INTRALESIONAL; INTRAMUSCULAR; SOFT TISSUE ONCE
Status: COMPLETED | OUTPATIENT
Start: 2022-12-29 | End: 2022-12-29

## 2022-12-29 RX ORDER — AMOXICILLIN 500 MG/1
500 CAPSULE ORAL 2 TIMES DAILY
Qty: 20 CAPSULE | Refills: 0 | Status: SHIPPED | OUTPATIENT
Start: 2022-12-29 | End: 2023-01-08

## 2022-12-29 RX ADMIN — METHYLPREDNISOLONE ACETATE 40 MG: 40 INJECTION, SUSPENSION INTRA-ARTICULAR; INTRALESIONAL; INTRAMUSCULAR; SOFT TISSUE at 13:32

## 2022-12-29 RX ADMIN — METHYLPREDNISOLONE ACETATE 40 MG: 40 INJECTION, SUSPENSION INTRA-ARTICULAR; INTRALESIONAL; INTRAMUSCULAR; SOFT TISSUE at 13:33

## 2022-12-29 ASSESSMENT — ENCOUNTER SYMPTOMS
DIARRHEA: 0
ABDOMINAL DISTENTION: 0
SHORTNESS OF BREATH: 0
NAUSEA: 0
CHEST TIGHTNESS: 1
SORE THROAT: 1
ABDOMINAL PAIN: 0
WHEEZING: 0
TROUBLE SWALLOWING: 0

## 2022-12-29 NOTE — LETTER
[facility]  [address] [place] [id number]  Phone: [phone]  Fax: [phone], [place]        December 29, 2022     Patient: Donnie Oseguera   YOB: 1985   Date of Visit: 12/29/2022       To Whom It May Concern: It is my medical opinion that Dottie Vera should not go back to work till 01/02/2023    If you have any questions or concerns, please don't hesitate to call.     Sincerely,        Sonja Tompkins DO

## 2022-12-29 NOTE — PROGRESS NOTES
2022     Jenny Liriano (:  1985) is a 40 y.o. female, here for evaluation of the following medical concerns:    HPI  Chest tightness  Zane presents today for evaluation of chest tightness and diffuse rash. Patient states that she started feeling sick last Wednesday with fevers, chills and very sore throat. She had been putting up with it and going into work until she woke up on Monday morning in the soreness with was too painful for her to swallow. She presented to a urgent care where she tested positive for strep pharyngitis. She had had no shortness of breath up to this point. She was started on azithromycin on Monday. When she woke up yesterday morning she noticed some red patches on her skin as well as some chest tightness. When she walks around she feels short of breath and has a dry cough. She is noticed improvement in her fevers and chills. Review of Systems   Constitutional:  Negative for activity change, appetite change, fatigue and unexpected weight change. HENT:  Positive for sore throat. Negative for trouble swallowing. Eyes:  Negative for visual disturbance. Respiratory:  Positive for chest tightness. Negative for shortness of breath and wheezing. Cardiovascular:  Positive for chest pain. Gastrointestinal:  Negative for abdominal distention, abdominal pain, diarrhea and nausea. Endocrine: Negative for polydipsia, polyphagia and polyuria. Genitourinary:  Negative for decreased urine volume, dysuria and frequency. Musculoskeletal:  Negative for gait problem and myalgias. Skin:  Negative for rash and wound. Neurological:  Negative for dizziness, weakness, light-headedness and numbness. Hematological:  Does not bruise/bleed easily. Prior to Visit Medications    Medication Sig Taking?  Authorizing Provider   amoxicillin (AMOXIL) 500 MG capsule Take 1 capsule by mouth 2 times daily for 10 days Yes Isidro Henry DO   triamcinolone (KENALOG) 0.1 % ointment Apply topically 2 times daily for 7 days Yes Mortimer Smiling, DO   acyclovir (ZOVIRAX) 200 MG capsule TAKE 1 CAPSULE BY MOUTH EVERY 4 HOURS WHILE AWAKE FOR 10 DAYS Yes Mortimer Smiling, DO   predniSONE (DELTASONE) 10 MG tablet TAKE 1 TABLET BY MOUTH DAILY Yes Mortimer Smiling, DO   methotrexate (RHEUMATREX) 2.5 MG chemo tablet TAKE 8 TABLETS BY MOUTH ONCE WEEKLY, ON THE SAME DAY EVERY WEEK Yes Mortimer Smiling, DO   Continuous Blood Gluc Sensor (DEXCOM G6 SENSOR) MISC Change every 10 days. Use for personal CGMS. On Multiple insulin shots, checks blood sugars 4 times per day and requires frequent insulin adjustment. Yes Mortimer Smiling, DO   Insulin Syringe-Needle U-100 ([employer] INS SYR .3CC/29G) 29G X 1/2\" 0.3 ML MISC 1 each by Does not apply route daily Yes Mortimer Smiling, DO   hydroxychloroquine (PLAQUENIL) 200 MG tablet TAKE 1 TABLET BY MOUTH TWICE DAILY Yes Mortimer Smiling, DO   lisinopril (PRINIVIL;ZESTRIL) 10 MG tablet TAKE 1 TABLET BY MOUTH DAILY Yes Mortimer Smiling, DO   folic acid (FOLVITE) 1 MG tablet Take 1 tab po daily.  Yes Mortimer Smiling, DO   TRUE METRIX BLOOD GLUCOSE TEST strip USE 1 STRIP TO TEST BLOOD SUGAR FOUR TIMES DAILY AS DIRECTED Yes Mortimer Smiling, DO   insulin lispro, 1 Unit Dial, 100 UNIT/ML SOPN INJECT 30 UNITS UNDER THE SKIN THREE TIMES DAILY BEFORE MEALS Yes Arlin Teague MD   ezetimibe (ZETIA) 10 MG tablet Take 1 tablet by mouth daily Yes Arlin Teague MD   Blood Glucose Monitoring Suppl (TRUE METRIX AIR GLUCOSE METER) CRYSTAL by Does not apply route Yes Cecilia Park MD   Continuous Blood Gluc  (DEXCOM G6 ) CRYSTAL Use for personal CGM Yes Arlin Teague MD   Continuous Blood Gluc Transmit (DEXCOM G6 TRANSMITTER) MISC Change every 3 months Yes Arlin Teague MD   Glucagon, rDNA, (GLUCAGON EMERGENCY) 1 MG KIT For severely low sugar Yes Arlin Teague MD   rosuvastatin (CRESTOR) 10 MG tablet Take 1 tablet by mouth daily Yes Arlin Teague MD   insulin lispro (HUMALOG) 100 UNIT/ML injection vial ADMINISTER 15 UNITS UNDER THE SKIN THREE TIMES DAILY BEFORE MEALS  Patient taking differently: ADMINISTER 30 UNITS UNDER THE SKIN THREE TIMES DAILY BEFORE MEALS Yes Tuyet Garcia, DO   Lancets MISC 1 each by Does not apply route 2 times daily Yes Tuyet Garcia, DO   insulin glargine (LANTUS SOLOSTAR) 100 UNIT/ML injection pen Inject 100 Units into the skin nightly  Tuyet Garcia DO        Social History     Tobacco Use    Smoking status: Never    Smokeless tobacco: Never   Substance Use Topics    Alcohol use: No        Vitals:    12/29/22 1223   BP: 126/70   Pulse: 72   Temp: 97.8 °F (36.6 °C)   SpO2: 97%   Weight: 290 lb (131.5 kg)   Height: 5' 5\" (1.651 m)     Estimated body mass index is 48.26 kg/m² as calculated from the following:    Height as of this encounter: 5' 5\" (1.651 m). Weight as of this encounter: 290 lb (131.5 kg). Physical Exam  Vitals reviewed. Constitutional:       General: She is not in acute distress. Appearance: Normal appearance. She is obese. She is not ill-appearing, toxic-appearing or diaphoretic. HENT:      Head: Normocephalic and atraumatic. Nose: Nose normal.      Mouth/Throat:      Pharynx: Oropharyngeal exudate and posterior oropharyngeal erythema present. Tonsils: 3+ on the right. 3+ on the left. Eyes:      Extraocular Movements: Extraocular movements intact. Conjunctiva/sclera: Conjunctivae normal.   Cardiovascular:      Rate and Rhythm: Normal rate and regular rhythm. Pulses: Normal pulses. Heart sounds: Normal heart sounds. Pulmonary:      Effort: Pulmonary effort is normal.      Breath sounds: Normal breath sounds. Abdominal:      General: Abdomen is flat. Bowel sounds are normal.      Palpations: Abdomen is soft. Musculoskeletal:      Cervical back: Normal range of motion and neck supple. Skin:     General: Skin is warm and dry. Capillary Refill: Capillary refill takes less than 2 seconds. Findings: Rash (Diffuse urticarial rash) present. Neurological:      Mental Status: She is alert. Mental status is at baseline. Psychiatric:         Mood and Affect: Mood normal.         Behavior: Behavior normal.         Thought Content: Thought content normal.         Judgment: Judgment normal.       ASSESSMENT/PLAN:  1. Allergic reaction to drug, initial encounter: Has a diffuse rash suggestive of allergies and some mild shortness of breath with normal lung exam today. All of the symptoms started after her azithromycin, so my concern is that this could potentially be an allergic reaction. She is in no acute respiratory and distress and she does think the symptoms of gotten mildly better, which I suspect may be related to the lower dose of azithromycin in the following days. We will switch her to amoxicillin and give her a Medrol shot today. She will follow-up tomorrow if she has not noted improvement in her symptoms. Counseled her on reasons to return, call 911 or present to the emergency department for evaluation. - amoxicillin (AMOXIL) 500 MG capsule; Take 1 capsule by mouth 2 times daily for 10 days  Dispense: 20 capsule; Refill: 0    Return if symptoms worsen or fail to improve. An electronic signature was used to authenticate this note.     --Giovanni Banuelos DO on 12/29/2022 at 12:56 PM

## 2023-01-09 DIAGNOSIS — E10.29 TYPE 1 DIABETES MELLITUS WITH MICROALBUMINURIA (HCC): ICD-10-CM

## 2023-01-09 DIAGNOSIS — R80.9 TYPE 1 DIABETES MELLITUS WITH MICROALBUMINURIA (HCC): ICD-10-CM

## 2023-01-09 RX ORDER — INSULIN LISPRO 100 [IU]/ML
INJECTION, SOLUTION INTRAVENOUS; SUBCUTANEOUS
Qty: 10 ML | Refills: 5 | Status: SHIPPED | OUTPATIENT
Start: 2023-01-09

## 2023-01-09 NOTE — TELEPHONE ENCOUNTER
Medication:   Requested Prescriptions     Pending Prescriptions Disp Refills    insulin lispro (HUMALOG) 100 UNIT/ML SOLN injection vial [Pharmacy Med Name: INSULIN LISPRO 100U/ML VIAL 10ML] 10 mL      Sig: ADMINISTER 15 UNITS UNDER THE SKIN THREE TIMES DAILY BEFORE MEALS       Last Filled:      Patient Phone Number: [phone] (home)     Last appt: 12/29/2022   Next appt: Visit date not found  Return if symptoms worsen or fail to improve.   Last PSA: No results found for: PSA

## 2023-01-11 ENCOUNTER — TELEMEDICINE (OUTPATIENT)
Dept: PRIMARY CARE CLINIC | Age: 38
End: 2023-01-11
Payer: COMMERCIAL

## 2023-01-11 DIAGNOSIS — J20.9 ACUTE BRONCHITIS, UNSPECIFIED ORGANISM: Primary | ICD-10-CM

## 2023-01-11 PROCEDURE — G8417 CALC BMI ABV UP PARAM F/U: HCPCS | Performed by: STUDENT IN AN ORGANIZED HEALTH CARE EDUCATION/TRAINING PROGRAM

## 2023-01-11 PROCEDURE — 1036F TOBACCO NON-USER: CPT | Performed by: STUDENT IN AN ORGANIZED HEALTH CARE EDUCATION/TRAINING PROGRAM

## 2023-01-11 PROCEDURE — G8427 DOCREV CUR MEDS BY ELIG CLIN: HCPCS | Performed by: STUDENT IN AN ORGANIZED HEALTH CARE EDUCATION/TRAINING PROGRAM

## 2023-01-11 PROCEDURE — G8484 FLU IMMUNIZE NO ADMIN: HCPCS | Performed by: STUDENT IN AN ORGANIZED HEALTH CARE EDUCATION/TRAINING PROGRAM

## 2023-01-11 PROCEDURE — 99213 OFFICE O/P EST LOW 20 MIN: CPT | Performed by: STUDENT IN AN ORGANIZED HEALTH CARE EDUCATION/TRAINING PROGRAM

## 2023-01-11 RX ORDER — PREDNISONE 20 MG/1
40 TABLET ORAL DAILY
Qty: 10 TABLET | Refills: 0 | Status: SHIPPED | OUTPATIENT
Start: 2023-01-11 | End: 2023-01-16

## 2023-01-11 RX ORDER — GUAIFENESIN/DEXTROMETHORPHAN 100-10MG/5
5 SYRUP ORAL 3 TIMES DAILY PRN
Qty: 120 ML | Refills: 0 | Status: SHIPPED | OUTPATIENT
Start: 2023-01-11 | End: 2023-01-21

## 2023-01-11 ASSESSMENT — PATIENT HEALTH QUESTIONNAIRE - PHQ9
SUM OF ALL RESPONSES TO PHQ QUESTIONS 1-9: 0
2. FEELING DOWN, DEPRESSED OR HOPELESS: 0
SUM OF ALL RESPONSES TO PHQ QUESTIONS 1-9: 0
1. LITTLE INTEREST OR PLEASURE IN DOING THINGS: 0
SUM OF ALL RESPONSES TO PHQ QUESTIONS 1-9: 0
SUM OF ALL RESPONSES TO PHQ QUESTIONS 1-9: 0
SUM OF ALL RESPONSES TO PHQ9 QUESTIONS 1 & 2: 0

## 2023-01-11 ASSESSMENT — ENCOUNTER SYMPTOMS
SHORTNESS OF BREATH: 0
ABDOMINAL PAIN: 0
TROUBLE SWALLOWING: 0
NAUSEA: 0
SORE THROAT: 0
CHEST TIGHTNESS: 1
WHEEZING: 0
DIARRHEA: 0
ABDOMINAL DISTENTION: 0

## 2023-01-11 NOTE — PROGRESS NOTES
2023       TELEHEALTH EVALUATION -- Audio/Visual (During NKDBN-12 public health emergency)    HPI:    Donnie Oseguera (:  1985) has requested an audio/video evaluation for the following concern(s):    Cough  Marisa presents today for evaluation of a cough. She was seen in the urgent care last week complaining of a sore throat. She was diagnosed with strep throat and given azithromycin. A few days after starting the azithromycin she started having chest tightness and shortness of breath. It was thought it could possibly be an allergic reaction, so the azithromycin was stopped that she was switched to amoxicillin. Today she feels much better but continues to complain of dry cough and chest tightness. She has had no additional fevers, chills, headaches, myalgias, nausea, vomiting or diarrhea. Review of Systems   Constitutional:  Negative for activity change, appetite change, fatigue and unexpected weight change. HENT:  Negative for sore throat and trouble swallowing. Eyes:  Negative for visual disturbance. Respiratory:  Positive for chest tightness. Negative for shortness of breath and wheezing. Cardiovascular:  Positive for chest pain. Gastrointestinal:  Negative for abdominal distention, abdominal pain, diarrhea and nausea. Endocrine: Negative for polydipsia, polyphagia and polyuria. Genitourinary:  Negative for decreased urine volume, dysuria and frequency. Musculoskeletal:  Negative for gait problem and myalgias. Skin:  Negative for rash and wound. Neurological:  Negative for dizziness, weakness, light-headedness and numbness. Hematological:  Does not bruise/bleed easily. Prior to Visit Medications    Medication Sig Taking?  Authorizing Provider   predniSONE (DELTASONE) 20 MG tablet Take 2 tablets by mouth daily for 5 days Yes Sonja Tompkins DO   guaiFENesin-dextromethorphan (ROBITUSSIN DM) 100-10 MG/5ML syrup Take 5 mLs by mouth 3 times daily as needed for Cough Yes Kit Sandifer, DO   insulin lispro (HUMALOG) 100 UNIT/ML SOLN injection vial ADMINISTER 30 UNITS UNDER THE SKIN THREE TIMES DAILY BEFORE MEALS Yes Kit Sandifer, DO   acyclovir (ZOVIRAX) 200 MG capsule TAKE 1 CAPSULE BY MOUTH EVERY 4 HOURS WHILE AWAKE FOR 10 DAYS Yes Kit Sandifer, DO   predniSONE (DELTASONE) 10 MG tablet TAKE 1 TABLET BY MOUTH DAILY Yes Kit Sandifer, DO   methotrexate (RHEUMATREX) 2.5 MG chemo tablet TAKE 8 TABLETS BY MOUTH ONCE WEEKLY, ON THE SAME DAY EVERY WEEK Yes Kit Sandifer, DO   Continuous Blood Gluc Sensor (DEXCOM G6 SENSOR) MISC Change every 10 days. Use for personal CGMS. On Multiple insulin shots, checks blood sugars 4 times per day and requires frequent insulin adjustment. Yes Kit Sandifer, DO   Insulin Syringe-Needle U-100 ([employer] INS SYR .3CC/29G) 29G X 1/2\" 0.3 ML MISC 1 each by Does not apply route daily Yes Kit Sandifer, DO   hydroxychloroquine (PLAQUENIL) 200 MG tablet TAKE 1 TABLET BY MOUTH TWICE DAILY Yes Kit Sandifer, DO   lisinopril (PRINIVIL;ZESTRIL) 10 MG tablet TAKE 1 TABLET BY MOUTH DAILY Yes Kit Sandifer, DO   folic acid (FOLVITE) 1 MG tablet Take 1 tab po daily.  Yes Kit Sandifer, DO   TRUE METRIX BLOOD GLUCOSE TEST strip USE 1 STRIP TO TEST BLOOD SUGAR FOUR TIMES DAILY AS DIRECTED Yes Kit Sandifer, DO   insulin lispro, 1 Unit Dial, 100 UNIT/ML SOPN INJECT 30 UNITS UNDER THE SKIN THREE TIMES DAILY BEFORE MEALS Yes Brendon Mckee MD   ezetimibe (ZETIA) 10 MG tablet Take 1 tablet by mouth daily Yes Brendon Mckee MD   Blood Glucose Monitoring Suppl (TRUE METRIX AIR GLUCOSE METER) CRYSTAL by Does not apply route Yes Historical Provider, MD   Continuous Blood Gluc  (DEXCOM G6 ) CRYSTAL Use for personal CGM Yes Brendon Mckee MD   Continuous Blood Gluc Transmit (DEXCOM G6 TRANSMITTER) MISC Change every 3 months Yes Brendon Mckee MD   Glucagon, rDNA, (GLUCAGON EMERGENCY) 1 MG KIT For severely low sugar Yes Brendon Mckee MD   rosuvastatin (CRESTOR) 10 MG tablet Take 1 tablet by mouth daily Yes Thom Lambert MD   Lancets MISC 1 each by Does not apply route 2 times daily Yes Abigail Yanez DO   insulin glargine (LANTUS SOLOSTAR) 100 UNIT/ML injection pen Inject 100 Units into the skin nightly  Abigail Yanez, DO       Social History     Tobacco Use    Smoking status: Never    Smokeless tobacco: Never   Vaping Use    Vaping Use: Never used   Substance Use Topics    Alcohol use: No    Drug use: No            PHYSICAL EXAMINATION:  There were no vitals taken for this visit. Wt Readings from Last 3 Encounters:   12/29/22 290 lb (131.5 kg)   10/17/22 289 lb 3.2 oz (131.2 kg)   07/14/22 284 lb 3.2 oz (128.9 kg)       [ INSTRUCTIONS:  \"[x]\" Indicates a positive item  \"[]\" Indicates a negative item  -- DELETE ALL ITEMS NOT EXAMINED]  [x] Alert  [x] Oriented to person/place/time    [x] No apparent distress  []  Appears Unwell:     [x] Breathing appears normal  [] Appears tachypneic      [] Rash on visible skin:    [x] Cranial Nerves II-XII grossly intact    [] Motor grossly intact in visible upper extremities    [] Motor grossly intact in visible lower extremities    [x] Normal Mood  [] Anxious appearing    [] Depressed appearing     [x] OTHER:  coughing throughout encounter    Due to this being a TeleHealth encounter, evaluation of the following organ systems is limited: Vitals/Constitutional/EENT/Resp/CV/GI//MS/Neuro/Skin/Heme-Lymph-Imm.   Lab Results   Component Value Date     03/10/2022    K 4.6 03/10/2022     03/10/2022    CO2 21 03/10/2022    BUN 12 03/10/2022    CREATININE <0.5 (L) 03/10/2022    GLUCOSE 260 (H) 03/10/2022    CALCIUM 9.0 03/10/2022    PROT 6.8 03/10/2022    LABALBU 3.9 03/10/2022    BILITOT <0.2 03/10/2022    ALKPHOS 125 03/10/2022    AST 21 03/10/2022    ALT 30 03/10/2022    LABGLOM >60 03/10/2022    GFRAA >60 03/10/2022    AGRATIO 1.3 03/10/2022    GLOB 3.2 01/14/2021     Lab Results   Component Value Date    LABA1C 8.9 10/17/2022     Lab Results   Component Value Date    .1 01/14/2021         ASSESSMENT/PLAN:  1. Acute bronchitis, unspecified organism: Stronger suspicion now that she actually may have had a viral illness rather than strep throat, which has now progressed to bronchitis. Symptoms of bacterial infection have resolved, but she has chest tightness and shortness of breath. On baseline prednisone for her rheumatoid arthritis. Will increase to 20 mg for the next 5 days and then drop back down to her baseline. Call if symptoms persist to Monday. At that time would consider more extensive work-up including possible chest x-ray. - predniSONE (DELTASONE) 20 MG tablet; Take 2 tablets by mouth daily for 5 days  Dispense: 10 tablet; Refill: 0  - guaiFENesin-dextromethorphan (ROBITUSSIN DM) 100-10 MG/5ML syrup; Take 5 mLs by mouth 3 times daily as needed for Cough  Dispense: 120 mL; Refill: 0    Return if symptoms worsen or fail to improve. Denyce Boas, was evaluated through a synchronous (real-time) audio-video encounter. The patient (or guardian if applicable) is aware that this is a billable service, which includes applicable co-pays. This Virtual Visit was conducted with patient's (and/or legal guardian's) consent. The visit was conducted pursuant to the emergency declaration under the [address] [place] [address], [address] [place] [address] authority and the [employer] Act. Patient identification was verified, and a caregiver was present when appropriate. The patient was located at Home: [address] [place] [id number]. Provider was located at [facility] (Appt Dept): [address] [place] [address] [place] [address]. Total time spent for this encounter: Not billed by time    --Purnima Reich DO on 1/11/2023 at 12:22 PM    An electronic signature was used to authenticate this note.   An  electronic signature was used to authenticate this note. --Ara Sharma DO on 1/11/2023 at 12:22 PM        Pursuant to the emergency declaration under the [address] [place] [address] waiver authority and the [employer] and [employer] Act, this Virtual  Visit was conducted, with patient's consent, to reduce the patient's risk of exposure to COVID-19 and provide continuity of care for an established patient. Services were provided through a video synchronous discussion virtually to substitute for in-person clinic visit.

## 2023-01-13 DIAGNOSIS — E66.9 TYPE 1 DIABETES MELLITUS WITH OBESITY (HCC): ICD-10-CM

## 2023-01-13 DIAGNOSIS — E10.69 DYSLIPIDEMIA DUE TO TYPE 1 DIABETES MELLITUS (HCC): ICD-10-CM

## 2023-01-13 DIAGNOSIS — R80.9 TYPE 1 DIABETES MELLITUS WITH MICROALBUMINURIA (HCC): ICD-10-CM

## 2023-01-13 DIAGNOSIS — E78.5 DYSLIPIDEMIA DUE TO TYPE 1 DIABETES MELLITUS (HCC): ICD-10-CM

## 2023-01-13 DIAGNOSIS — E10.29 TYPE 1 DIABETES MELLITUS WITH MICROALBUMINURIA (HCC): ICD-10-CM

## 2023-01-13 DIAGNOSIS — M05.79 RHEUMATOID ARTHRITIS INVOLVING MULTIPLE SITES WITH POSITIVE RHEUMATOID FACTOR (HCC): ICD-10-CM

## 2023-01-13 DIAGNOSIS — E10.69 TYPE 1 DIABETES MELLITUS WITH OBESITY (HCC): ICD-10-CM

## 2023-01-13 RX ORDER — BLOOD-GLUCOSE,RECEIVER,CONT
EACH MISCELLANEOUS
Qty: 1 EACH | Refills: 0 | OUTPATIENT
Start: 2023-01-13

## 2023-01-13 NOTE — TELEPHONE ENCOUNTER
Requested Refill:   Requested Prescriptions     Pending Prescriptions Disp Refills    Continuous Blood Gluc  ([address]) [address] [Pharmacy Med Name: [address] 1 each 0     Sig: USE FOR PERSONAL CONTINUOUS GLUCOSE MONITORING       Last refilled: 10/17/2022 # 3 with 5 refills     Refill requested too soon!

## 2023-01-13 NOTE — TELEPHONE ENCOUNTER
Medication:   Requested Prescriptions     Pending Prescriptions Disp Refills    methotrexate (RHEUMATREX) 2.5 MG chemo tablet [Pharmacy Med Name: METHOTREXATE 2.5MG TABLETS - YELLOW] 35 tablet 0     Sig: TAKE 8 TABLETS BY MOUTH ONCE WEEKLY, ON THE SAME DAY EVERY WEEK        Last Filled:  12/8/22    Patient Phone Number: [phone] (home)     Last appt: 1/11/2023     Return if symptoms worsen or fail to improve  Next appt: Visit date not found    Last OARRS: No flowsheet data found.

## 2023-02-01 RX ORDER — ACYCLOVIR 200 MG/1
CAPSULE ORAL
Qty: 60 CAPSULE | Refills: 0 | Status: SHIPPED | OUTPATIENT
Start: 2023-02-01

## 2023-02-01 NOTE — TELEPHONE ENCOUNTER
Medication:   Requested Prescriptions     Pending Prescriptions Disp Refills    acyclovir (ZOVIRAX) 200 MG capsule [Pharmacy Med Name: ACYCLOVIR 200MG CAPSULES] 60 capsule 0     Sig: TAKE 1 CAPSULE BY MOUTH EVERY 4 HOURS WHILE AWAKE FOR 10 DAYS        Last Filled:  12/20/22    Patient Phone Number: [phone] (home)     Last appt: 1/11/2023 Return if symptoms worsen or fail to improve  Next appt: Visit date not found    Last OARRS: No flowsheet data found.

## 2023-02-09 DIAGNOSIS — E10.69 TYPE 1 DIABETES MELLITUS WITH OBESITY (HCC): ICD-10-CM

## 2023-02-09 DIAGNOSIS — E78.5 DYSLIPIDEMIA DUE TO TYPE 1 DIABETES MELLITUS (HCC): ICD-10-CM

## 2023-02-09 DIAGNOSIS — E10.69 DYSLIPIDEMIA DUE TO TYPE 1 DIABETES MELLITUS (HCC): ICD-10-CM

## 2023-02-09 DIAGNOSIS — E66.9 TYPE 1 DIABETES MELLITUS WITH OBESITY (HCC): ICD-10-CM

## 2023-02-09 DIAGNOSIS — E10.29 TYPE 1 DIABETES MELLITUS WITH MICROALBUMINURIA (HCC): ICD-10-CM

## 2023-02-09 DIAGNOSIS — R80.9 TYPE 1 DIABETES MELLITUS WITH MICROALBUMINURIA (HCC): ICD-10-CM

## 2023-02-09 RX ORDER — BLOOD-GLUCOSE SENSOR
EACH MISCELLANEOUS
Qty: 3 EACH | Refills: 5 | Status: SHIPPED | OUTPATIENT
Start: 2023-02-09

## 2023-02-09 RX ORDER — BLOOD-GLUCOSE TRANSMITTER
EACH MISCELLANEOUS
Qty: 1 EACH | Refills: 3 | Status: SHIPPED | OUTPATIENT
Start: 2023-02-09

## 2023-02-09 NOTE — PROGRESS NOTES
Medication:   Requested Prescriptions     Pending Prescriptions Disp Refills    Continuous Blood Gluc Sensor (DEXCOM G6 SENSOR) MISC 3 each 5     Sig: Change every 10 days. Use for personal CGMS. On Multiple insulin shots, checks blood sugars 4 times per day and requires frequent insulin adjustment. Continuous Blood Gluc Transmit (DEXCOM G6 TRANSMITTER) MISC 1 each 3     Sig: Change every 3 months        Last Filled:  10/17  04/02    Patient Phone Number: [phone] (home)     Last appt: 1/11/2023   Next appt: Visit date not found    Last OARRS: No flowsheet data found.

## 2023-02-16 DIAGNOSIS — M05.79 RHEUMATOID ARTHRITIS INVOLVING MULTIPLE SITES WITH POSITIVE RHEUMATOID FACTOR (HCC): ICD-10-CM

## 2023-02-16 NOTE — TELEPHONE ENCOUNTER
Medication:   Requested Prescriptions     Pending Prescriptions Disp Refills    methotrexate (RHEUMATREX) 2.5 MG chemo tablet [Pharmacy Med Name: METHOTREXATE 2.5MG TABLETS - YELLOW] 35 tablet 0     Sig: TAKE 8 TABLETS BY MOUTH ONCE WEEKLY, ON THE SAME DAY EACH WEEK        Last Filled:  01/13/23    Patient Phone Number: [phone] (home)     Last appt: 1/11/2023 Return if symptoms worsen or fail to improve. Next appt: Visit date not found    Last OARRS: No flowsheet data found.

## 2023-02-24 ENCOUNTER — HOSPITAL ENCOUNTER (OUTPATIENT)
Dept: GENERAL RADIOLOGY | Age: 38
Discharge: HOME | End: 2023-02-24
Payer: COMMERCIAL

## 2023-02-24 ENCOUNTER — OFFICE VISIT (OUTPATIENT)
Dept: PRIMARY CARE CLINIC | Age: 38
End: 2023-02-24
Payer: COMMERCIAL

## 2023-02-24 ENCOUNTER — HOSPITAL ENCOUNTER (OUTPATIENT)
Age: 38
Discharge: HOME | End: 2023-02-24
Payer: COMMERCIAL

## 2023-02-24 VITALS
BODY MASS INDEX: 48.82 KG/M2 | SYSTOLIC BLOOD PRESSURE: 139 MMHG | HEIGHT: 65 IN | HEART RATE: 79 BPM | TEMPERATURE: 97.9 F | WEIGHT: 293 LBS | DIASTOLIC BLOOD PRESSURE: 73 MMHG

## 2023-02-24 DIAGNOSIS — M54.41 CHRONIC RIGHT-SIDED LOW BACK PAIN WITH RIGHT-SIDED SCIATICA: ICD-10-CM

## 2023-02-24 DIAGNOSIS — G89.29 CHRONIC RIGHT-SIDED LOW BACK PAIN WITH RIGHT-SIDED SCIATICA: Primary | ICD-10-CM

## 2023-02-24 DIAGNOSIS — M54.41 CHRONIC RIGHT-SIDED LOW BACK PAIN WITH RIGHT-SIDED SCIATICA: Primary | ICD-10-CM

## 2023-02-24 DIAGNOSIS — G89.29 CHRONIC RIGHT-SIDED LOW BACK PAIN WITH RIGHT-SIDED SCIATICA: ICD-10-CM

## 2023-02-24 PROCEDURE — 1036F TOBACCO NON-USER: CPT | Performed by: STUDENT IN AN ORGANIZED HEALTH CARE EDUCATION/TRAINING PROGRAM

## 2023-02-24 PROCEDURE — G8427 DOCREV CUR MEDS BY ELIG CLIN: HCPCS | Performed by: STUDENT IN AN ORGANIZED HEALTH CARE EDUCATION/TRAINING PROGRAM

## 2023-02-24 PROCEDURE — G8417 CALC BMI ABV UP PARAM F/U: HCPCS | Performed by: STUDENT IN AN ORGANIZED HEALTH CARE EDUCATION/TRAINING PROGRAM

## 2023-02-24 PROCEDURE — 72100 X-RAY EXAM L-S SPINE 2/3 VWS: CPT

## 2023-02-24 PROCEDURE — 3078F DIAST BP <80 MM HG: CPT | Performed by: STUDENT IN AN ORGANIZED HEALTH CARE EDUCATION/TRAINING PROGRAM

## 2023-02-24 PROCEDURE — 3075F SYST BP GE 130 - 139MM HG: CPT | Performed by: STUDENT IN AN ORGANIZED HEALTH CARE EDUCATION/TRAINING PROGRAM

## 2023-02-24 PROCEDURE — G8484 FLU IMMUNIZE NO ADMIN: HCPCS | Performed by: STUDENT IN AN ORGANIZED HEALTH CARE EDUCATION/TRAINING PROGRAM

## 2023-02-24 PROCEDURE — 99213 OFFICE O/P EST LOW 20 MIN: CPT | Performed by: STUDENT IN AN ORGANIZED HEALTH CARE EDUCATION/TRAINING PROGRAM

## 2023-02-24 RX ORDER — HYDROCHLOROTHIAZIDE 25 MG/1
25 TABLET ORAL EVERY MORNING
Qty: 30 TABLET | Refills: 5 | Status: SHIPPED | OUTPATIENT
Start: 2023-02-24 | End: 2023-02-24

## 2023-02-24 RX ORDER — FUROSEMIDE 20 MG/1
20 TABLET ORAL DAILY
Qty: 20 TABLET | Refills: 1 | Status: SHIPPED | OUTPATIENT
Start: 2023-02-24

## 2023-02-24 SDOH — ECONOMIC STABILITY: INCOME INSECURITY: HOW HARD IS IT FOR YOU TO PAY FOR THE VERY BASICS LIKE FOOD, HOUSING, MEDICAL CARE, AND HEATING?: NOT HARD AT ALL

## 2023-02-24 SDOH — ECONOMIC STABILITY: FOOD INSECURITY: WITHIN THE PAST 12 MONTHS, THE FOOD YOU BOUGHT JUST DIDN'T LAST AND YOU DIDN'T HAVE MONEY TO GET MORE.: NEVER TRUE

## 2023-02-24 SDOH — ECONOMIC STABILITY: FOOD INSECURITY: WITHIN THE PAST 12 MONTHS, YOU WORRIED THAT YOUR FOOD WOULD RUN OUT BEFORE YOU GOT MONEY TO BUY MORE.: NEVER TRUE

## 2023-02-24 SDOH — ECONOMIC STABILITY: HOUSING INSECURITY
IN THE LAST 12 MONTHS, WAS THERE A TIME WHEN YOU DID NOT HAVE A STEADY PLACE TO SLEEP OR SLEPT IN A SHELTER (INCLUDING NOW)?: NO

## 2023-02-24 SDOH — ECONOMIC STABILITY: TRANSPORTATION INSECURITY
IN THE PAST 12 MONTHS, HAS LACK OF TRANSPORTATION KEPT YOU FROM MEETINGS, WORK, OR FROM GETTING THINGS NEEDED FOR DAILY LIVING?: NO

## 2023-02-24 ASSESSMENT — ENCOUNTER SYMPTOMS: BACK PAIN: 0

## 2023-02-24 NOTE — PATIENT INSTRUCTIONS
Patient Education        Low Back Arthritis: Exercises  Introduction  Here are some examples of typical rehabilitation exercises for your condition. Start each exercise slowly. Ease off the exercise if you start to have pain. Your doctor or physical therapist will tell you when you can start these exercises and which ones will work best for you. When you are not being active, find a comfortable position for rest. Some people are comfortable on the floor or a medium-firm bed with a small pillow under their head and another under their knees. Some people prefer to lie on their side with a pillow between their knees. Don't stay in one position for too long. Take short walks (10 to 20 minutes) every 2 to 3 hours. Avoid slopes, hills, and stairs until you feel better. Walk only distances you can manage without pain, especially leg pain. How to do the exercises  Pelvic tilt  Lie on your back with your knees bent. \"Brace\" your stomach--tighten your muscles by pulling in and imagining your belly button moving toward your spine. Press your lower back into the floor. You should feel your hips and pelvis rock back. Hold for 6 seconds while breathing smoothly. Relax and allow your pelvis and hips to rock forward. Repeat 8 to 12 times. Back stretches  Get down on your hands and knees on the floor. Relax your head and allow it to droop. Round your back up toward the ceiling until you feel a nice stretch in your upper, middle, and lower back. Hold this stretch for as long as it feels comfortable, or about 15 to 30 seconds. Return to the starting position with a flat back while you are on your hands and knees. Let your back sway by pressing your stomach toward the floor. Lift your buttocks toward the ceiling. Hold this position for 15 to 30 seconds. Repeat 2 to 4 times. Follow-up care is a key part of your treatment and safety.  Be sure to make and go to all appointments, and call your doctor if you are having problems. It's also a good idea to know your test results and keep a list of the medicines you take. Current as of: March 9, 2022               Content Version: 13.5  © 2006-2022 [employer]. Care instructions adapted under license by [facility] ([facility]). If you have questions about a medical condition or this instruction, always ask your healthcare professional. [place] [address] any warranty or liability for your use of this information.

## 2023-02-24 NOTE — PROGRESS NOTES
2023     Denise Teran (:  1985) is a 40 y.o. female, here for evaluation of the following medical concerns:    HPI  Back Pain:  Patient complains of a 1 month(s) history of right lower back pain. Pain is tingling in nature, with radiation to thigh. Pain is intermittent, typically moderate in intensity, and is exacerbated by walking. Associated symptoms: none. Patient reports the following CPR Red Flags: none. Precipitating factors: occupational activities-started a new job and walks a lot more. Patient's history includes no prior back problems. Previous treatment: none. Diagnostic testing: none. Symptoms show no change over time. Review of Systems   Constitutional:  Negative for activity change. Genitourinary:  Negative for difficulty urinating, flank pain and frequency. Musculoskeletal:  Negative for back pain, gait problem, neck pain and neck stiffness. Neurological:  Positive for numbness. Negative for weakness. Prior to Visit Medications    Medication Sig Taking? Authorizing Provider   furosemide (LASIX) 20 MG tablet Take 1 tablet by mouth daily Yes Alena Issa DO   methotrexate (RHEUMATREX) 2.5 MG chemo tablet TAKE 8 TABLETS BY MOUTH ONCE WEEKLY, ON THE SAME DAY EACH WEEK Yes Alena Issa, DO   Continuous Blood Gluc Sensor (DEXCOM G6 SENSOR) MISC Change every 10 days. Use for personal CGMS. On Multiple insulin shots, checks blood sugars 4 times per day and requires frequent insulin adjustment.  Yes Alena Issa, DO   Continuous Blood Gluc Transmit (DEXCOM G6 TRANSMITTER) MISC Change every 3 months Yes Alena Issa DO   acyclovir (ZOVIRAX) 200 MG capsule TAKE 1 CAPSULE BY MOUTH EVERY 4 HOURS WHILE AWAKE FOR 10 DAYS Yes Alena Issa DO   predniSONE (DELTASONE) 10 MG tablet TAKE 1 TABLET BY MOUTH DAILY Yes Alena Issa DO   Insulin Syringe-Needle U-100 ([employer] INS SYR .3CC/29G) 29G X 1/2\" 0.3 ML MISC 1 each by Does not apply route daily Yes Alena Issa DO   hydroxychloroquine (PLAQUENIL) 200 MG tablet TAKE 1 TABLET BY MOUTH TWICE DAILY Yes Abigail Yanez DO   lisinopril (PRINIVIL;ZESTRIL) 10 MG tablet TAKE 1 TABLET BY MOUTH DAILY Yes Abigail Yanez DO   folic acid (FOLVITE) 1 MG tablet Take 1 tab po daily. Yes Abigail Yanez, DO   insulin lispro, 1 Unit Dial, 100 UNIT/ML SOPN INJECT 30 UNITS UNDER THE SKIN THREE TIMES DAILY BEFORE MEALS Yes Thom Lambert MD   ezetimibe (ZETIA) 10 MG tablet Take 1 tablet by mouth daily Yes Thom Lambert MD   Blood Glucose Monitoring Suppl (TRUE METRIX AIR GLUCOSE METER) CRYSTAL by Does not apply route Yes Historical Provider, MD   Continuous Blood Gluc  ([address]) CRYSTAL Use for personal CGM Yes Thom Lambert MD   Glucagon, rDNA, (GLUCAGON EMERGENCY) 1 MG KIT For severely low sugar Yes Thom Lambert MD   rosuvastatin (CRESTOR) 10 MG tablet Take 1 tablet by mouth daily Yes Thom Lambert MD   insulin lispro (HUMALOG) 100 UNIT/ML SOLN injection vial ADMINISTER 30 UNITS UNDER THE SKIN THREE TIMES DAILY BEFORE MEALS  Patient not taking: Reported on 2/24/2023  Abigail Yanez DO   insulin glargine (LANTUS SOLOSTAR) 100 UNIT/ML injection pen Inject 100 Units into the skin nightly  Abigail Yanez DO   TRUE METRIX BLOOD GLUCOSE TEST strip USE 1 STRIP TO TEST BLOOD SUGAR FOUR TIMES DAILY AS DIRECTED  Patient not taking: Reported on 2/24/2023  Abigail Yanez DO   Lancets [address] 1 each by Does not apply route 2 times daily  Patient not taking: Reported on 2/24/2023  Abigail Yanez DO        Social History     Tobacco Use    Smoking status: Never    Smokeless tobacco: Never   Substance Use Topics    Alcohol use: No        Vitals:    02/24/23 0741   BP: 139/73   Pulse: 79   Temp: 97.9 °F (36.6 °C)   TempSrc: Temporal   Weight: 299 lb (135.6 kg)   Height: 5' 5\" (1.651 m)     Estimated body mass index is 49.76 kg/m² as calculated from the following:    Height as of this encounter: 5' 5\" (1.651 m).     Weight as of this encounter: 299 lb (135.6 kg). Physical Exam  Constitutional:       Appearance: Normal appearance. She is obese. HENT:      Head: Normocephalic and atraumatic. Cardiovascular:      Rate and Rhythm: Normal rate. Pulmonary:      Effort: Pulmonary effort is normal.   Musculoskeletal:         General: No tenderness or deformity. Skin:     General: Skin is warm and dry. Neurological:      Mental Status: She is alert. Sensory: No sensory deficit. Motor: No weakness. Gait: Gait normal.      Deep Tendon Reflexes: Reflexes normal.   Psychiatric:         Behavior: Behavior normal.       ASSESSMENT/PLAN:  1. Chronic right-sided low back pain with right-sided sciatica: Exam today relatively normal.  Range of motion very good. My concern given the nature of her symptoms-occurs with walking and is only paresthesias-is that she may have degenerative disc disease, which I am sure is exacerbated by her obesity. We will check an x-ray and touch base once results. My hope is we can do physical therapy and try to draw her lumbar spine more forward to open space for the nerves when she is walking. If severe degenerative disease, will refer to spine. - XR LUMBAR SPINE (2-3 VIEWS); Future    Return in about 4 weeks (around 3/24/2023) for Blood Pressure, Diabetes. An electronic signature was used to authenticate this note.     --Kaity Victoria DO on 2/24/2023 at 7:56 AM

## 2023-03-01 DIAGNOSIS — M05.79 RHEUMATOID ARTHRITIS INVOLVING MULTIPLE SITES WITH POSITIVE RHEUMATOID FACTOR (HCC): ICD-10-CM

## 2023-03-02 RX ORDER — PREDNISONE 10 MG/1
10 TABLET ORAL DAILY
Qty: 35 TABLET | Refills: 1 | Status: SHIPPED | OUTPATIENT
Start: 2023-03-02

## 2023-03-07 DIAGNOSIS — E10.29 TYPE 1 DIABETES MELLITUS WITH MICROALBUMINURIA (HCC): ICD-10-CM

## 2023-03-07 DIAGNOSIS — R80.9 TYPE 1 DIABETES MELLITUS WITH MICROALBUMINURIA (HCC): ICD-10-CM

## 2023-03-07 RX ORDER — INSULIN LISPRO 100 [IU]/ML
INJECTION, SOLUTION INTRAVENOUS; SUBCUTANEOUS
Qty: 10 ML | Refills: 5 | Status: SHIPPED | OUTPATIENT
Start: 2023-03-07

## 2023-03-07 NOTE — TELEPHONE ENCOUNTER
Medication:   Requested Prescriptions     Pending Prescriptions Disp Refills    insulin lispro (HUMALOG) 100 UNIT/ML SOLN injection vial [Pharmacy Med Name: INSULIN LISPRO 100U/ML VIAL 10ML] 10 mL 5     Sig: ADMINISTER 30 UNITS UNDER THE SKIN THREE TIMES DAILY BEFORE MEALS        Last Filled:  01/09/23    Patient Phone Number: [phone] (home)     Last appt: 2/24/2023     Return in about 4 weeks (around 3/24/2023) for Blood Pressure, Diabetes. Next appt: Visit date not found    Last OARRS: No flowsheet data found.

## 2023-03-15 RX ORDER — ACYCLOVIR 200 MG/1
CAPSULE ORAL
Qty: 60 CAPSULE | Refills: 0 | Status: SHIPPED | OUTPATIENT
Start: 2023-03-15

## 2023-03-15 NOTE — TELEPHONE ENCOUNTER
Medication:   Requested Prescriptions     Pending Prescriptions Disp Refills    acyclovir (ZOVIRAX) 200 MG capsule [Pharmacy Med Name: ACYCLOVIR 200MG CAPSULES] 60 capsule 0     Sig: TAKE 1 CAPSULE BY MOUTH EVERY 4 HOURS WHILE AWAKE FOR 10 DAYS        Last Filled:  02/1/23    Patient Phone Number: [phone] (home)     Last appt: 2/24/2023 Return in about 4 weeks (around 3/24/2023) for Blood Pressure, Diabetes  Next appt: Visit date not found    Last OARRS: No flowsheet data found.

## 2023-03-26 DIAGNOSIS — M05.79 RHEUMATOID ARTHRITIS INVOLVING MULTIPLE SITES WITH POSITIVE RHEUMATOID FACTOR (HCC): ICD-10-CM

## 2023-03-27 NOTE — TELEPHONE ENCOUNTER
Medication:   Requested Prescriptions     Pending Prescriptions Disp Refills    methotrexate (RHEUMATREX) 2.5 MG chemo tablet [Pharmacy Med Name: METHOTREXATE 2.5MG TABLETS - YELLOW] 35 tablet 0     Sig: TAKE 8 TABLETS BY MOUTH ONCE WEEKLY, ON THE SAME DAY EACH WEEK       Last Filled:  02/16/23    Patient Phone Number: [phone] (home)     Last appt: 2/24/2023 Return in about 4 weeks (around 3/24/2023) for Blood Pressure, Diabetes.   Next appt: Visit date not found    Last Labs DM:   Lab Results   Component Value Date/Time    LABA1C 8.9 10/17/2022 10:56 AM     Last Lipid:   Lab Results   Component Value Date/Time    HDL 39 03/10/2022 08:37 AM    LDLCALC 111 03/10/2022 08:37 AM     Last PSA: No results found for: PSA  Last Thyroid: No results found for: TSH, FT3, O7YZQLV, T4FREE, E8FPQYW

## 2023-03-28 ENCOUNTER — TELEPHONE (OUTPATIENT)
Dept: PRIMARY CARE CLINIC | Age: 38
End: 2023-03-28

## 2023-03-28 NOTE — TELEPHONE ENCOUNTER
----- Message from Tracey Allred sent at 3/28/2023  9:12 AM EDT -----  Subject: Message to Provider    QUESTIONS  Information for Provider? Pt says her license is suspended and needs a   form completed in regards to her diabetes. Pt has an appointment for   annual exam plus this paperwork 4/4 but would like Dr. Brandon Elaine to fill out   sooner if he can because she needs to drive.   ---------------------------------------------------------------------------  --------------  Warden Fernandez INFO  [phone]; OK to leave message on voicemail  ---------------------------------------------------------------------------  --------------  SCRIPT ANSWERS  Relationship to Patient?  Self

## 2023-04-03 NOTE — PROGRESS NOTES
sounds. Pulmonary:      Effort: Pulmonary effort is normal.      Breath sounds: Normal breath sounds. Abdominal:      General: Abdomen is flat. Bowel sounds are normal.      Palpations: Abdomen is soft. Musculoskeletal:         General: No deformity. Cervical back: Normal range of motion and neck supple. Skin:     General: Skin is warm and dry. Capillary Refill: Capillary refill takes less than 2 seconds. Findings: No lesion or rash. Neurological:      General: No focal deficit present. Mental Status: She is alert and oriented to person, place, and time. Mental status is at baseline. Sensory: No sensory deficit. Psychiatric:         Mood and Affect: Mood normal.         Behavior: Behavior normal.         Thought Content: Thought content normal.         Judgment: Judgment normal.       ASSESSMENT/PLAN:  1. Type 1 diabetes mellitus with microalbuminuria ([facility].): A1c has been slowly increasing, which I suspect is related to the prednisone she has been using for her rheumatoid arthritis. Has not been able to see rheumatology due to insurance issues. Has not been compliant with her endocrinology appointments. Decreasing her prednisone from 10 to 5 mg today. Referring her to CGM. Told her she needs to schedule appointment with Dr. Vin Noble her endocrinologist.  We will follow-up in 6 weeks if she is not able to see Dr. Vin Noble before that time. - POCT glycosylated hemoglobin (Hb A1C)  - CGMS ( Continuous Glucose Monitor ), [facility]    2. Hypertension associated with diabetes ([facility]): Blood pressure at goal today. Tolerating current regimen well without side effects. Refills given. Routine follow up in 6 months. BP Readings from Last 3 Encounters:   04/04/23 136/83   02/24/23 139/73   12/29/22 126/70     - Comprehensive Metabolic Panel; Future    3. Type 1 diabetes mellitus with hyperlipidemia ([facility]): Compliant with statin.   Given information on the Mediterranean diet.  - CGMS

## 2023-04-04 ENCOUNTER — OFFICE VISIT (OUTPATIENT)
Dept: PRIMARY CARE CLINIC | Age: 38
End: 2023-04-04

## 2023-04-04 VITALS
HEART RATE: 86 BPM | TEMPERATURE: 97.9 F | WEIGHT: 293 LBS | DIASTOLIC BLOOD PRESSURE: 83 MMHG | BODY MASS INDEX: 48.82 KG/M2 | SYSTOLIC BLOOD PRESSURE: 136 MMHG | HEIGHT: 65 IN

## 2023-04-04 DIAGNOSIS — E66.9 TYPE 1 DIABETES MELLITUS WITH OBESITY (HCC): ICD-10-CM

## 2023-04-04 DIAGNOSIS — E10.29 TYPE 1 DIABETES MELLITUS WITH MICROALBUMINURIA (HCC): Primary | ICD-10-CM

## 2023-04-04 DIAGNOSIS — Z13.5 SCREENING FOR DIABETIC RETINOPATHY: ICD-10-CM

## 2023-04-04 DIAGNOSIS — I15.2 HYPERTENSION ASSOCIATED WITH DIABETES (HCC): ICD-10-CM

## 2023-04-04 DIAGNOSIS — E11.59 HYPERTENSION ASSOCIATED WITH DIABETES (HCC): ICD-10-CM

## 2023-04-04 DIAGNOSIS — E78.5 TYPE 1 DIABETES MELLITUS WITH HYPERLIPIDEMIA (HCC): ICD-10-CM

## 2023-04-04 DIAGNOSIS — E10.69 TYPE 1 DIABETES MELLITUS WITH HYPERLIPIDEMIA (HCC): ICD-10-CM

## 2023-04-04 DIAGNOSIS — E10.69 TYPE 1 DIABETES MELLITUS WITH OBESITY (HCC): ICD-10-CM

## 2023-04-04 DIAGNOSIS — M05.79 RHEUMATOID ARTHRITIS INVOLVING MULTIPLE SITES WITH POSITIVE RHEUMATOID FACTOR (HCC): ICD-10-CM

## 2023-04-04 DIAGNOSIS — Z13.89 SCREENING FOR NEPHROPATHY: ICD-10-CM

## 2023-04-04 DIAGNOSIS — R80.9 TYPE 1 DIABETES MELLITUS WITH MICROALBUMINURIA (HCC): Primary | ICD-10-CM

## 2023-04-04 LAB — HBA1C MFR BLD: 9.8 %

## 2023-04-04 RX ORDER — HYDROCHLOROTHIAZIDE 25 MG/1
TABLET ORAL
COMMUNITY
Start: 2023-02-24

## 2023-04-04 RX ORDER — PREDNISONE 1 MG/1
5 TABLET ORAL DAILY
Qty: 30 TABLET | Refills: 2 | Status: SHIPPED | OUTPATIENT
Start: 2023-04-04 | End: 2023-04-14

## 2023-04-04 ASSESSMENT — ENCOUNTER SYMPTOMS
NAUSEA: 0
ABDOMINAL DISTENTION: 0
DIARRHEA: 0
ABDOMINAL PAIN: 0
SHORTNESS OF BREATH: 0
CHEST TIGHTNESS: 0

## 2023-04-18 ENCOUNTER — PATIENT MESSAGE (OUTPATIENT)
Dept: PRIMARY CARE CLINIC | Age: 38
End: 2023-04-18

## 2023-04-18 ENCOUNTER — TELEPHONE (OUTPATIENT)
Dept: PRIMARY CARE CLINIC | Age: 38
End: 2023-04-18

## 2023-04-18 RX ORDER — BACITRACIN ZINC AND POLYMYXIN B SULFATE 500; 10000 [USP'U]/G; [USP'U]/G
OINTMENT OPHTHALMIC 2 TIMES DAILY
Qty: 3.5 G | Refills: 0 | Status: SHIPPED | OUTPATIENT
Start: 2023-04-18 | End: 2023-04-28

## 2023-04-18 NOTE — TELEPHONE ENCOUNTER
Pt states she has pink eye and would like to know if something can be called in. I told her she might need  appt.

## 2023-04-26 DIAGNOSIS — M05.79 RHEUMATOID ARTHRITIS INVOLVING MULTIPLE SITES WITH POSITIVE RHEUMATOID FACTOR (HCC): ICD-10-CM

## 2023-04-26 DIAGNOSIS — E10.29 TYPE 1 DIABETES MELLITUS WITH MICROALBUMINURIA (HCC): ICD-10-CM

## 2023-04-26 DIAGNOSIS — R80.9 TYPE 1 DIABETES MELLITUS WITH MICROALBUMINURIA (HCC): ICD-10-CM

## 2023-04-26 RX ORDER — INSULIN LISPRO 100 [IU]/ML
INJECTION, SOLUTION INTRAVENOUS; SUBCUTANEOUS
Qty: 10 ML | Refills: 5 | Status: SHIPPED | OUTPATIENT
Start: 2023-04-26

## 2023-04-26 RX ORDER — ACYCLOVIR 200 MG/1
CAPSULE ORAL
Qty: 60 CAPSULE | Refills: 0 | Status: SHIPPED | OUTPATIENT
Start: 2023-04-26

## 2023-04-26 RX ORDER — FUROSEMIDE 20 MG/1
20 TABLET ORAL DAILY
Qty: 20 TABLET | Refills: 1 | Status: SHIPPED | OUTPATIENT
Start: 2023-04-26

## 2023-04-26 NOTE — TELEPHONE ENCOUNTER
Medication:   Requested Prescriptions     Pending Prescriptions Disp Refills    methotrexate (RHEUMATREX) 2.5 MG chemo tablet [Pharmacy Med Name: METHOTREXATE 2.5MG TABLETS - YELLOW] 35 tablet 0     Sig: TAKE 8 TABLETS BY MOUTH ONCE WEEKLY, ON THE SAME DAY EACH WEEK    furosemide (LASIX) 20 MG tablet [Pharmacy Med Name: FUROSEMIDE 20MG TABLETS] 20 tablet 1     Sig: TAKE 1 TABLET BY MOUTH DAILY    acyclovir (ZOVIRAX) 200 MG capsule [Pharmacy Med Name: ACYCLOVIR 200MG CAPSULES] 60 capsule 0     Sig: TAKE 1 CAPSULE BY MOUTH EVERY 4 HOURS WHILE AWAKE FOR 10 DAYS    insulin lispro (HUMALOG) 100 UNIT/ML SOLN injection vial [Pharmacy Med Name: INSULIN LISPRO 100U/ML VIAL 10ML] 10 mL 5     Sig: ADMINISTER 30 UNITS UNDER THE SKIN THREE TIMES DAILY BEFORE MEALS        Last Filled:  03/27/23,02/24/23,03/15/23,03/07/23    Patient Phone Number: [phone] (home)     Last appt: 4/4/2023     Return in about 6 weeks (around 5/16/2023) for Diabetes  Next appt: Visit date not found    Last OARRS: No flowsheet data found.

## 2023-04-27 ENCOUNTER — OFFICE VISIT (OUTPATIENT)
Dept: PRIMARY CARE CLINIC | Age: 38
End: 2023-04-27
Payer: COMMERCIAL

## 2023-04-27 VITALS
BODY MASS INDEX: 48.82 KG/M2 | TEMPERATURE: 97.5 F | HEART RATE: 81 BPM | SYSTOLIC BLOOD PRESSURE: 133 MMHG | DIASTOLIC BLOOD PRESSURE: 75 MMHG | WEIGHT: 293 LBS | HEIGHT: 65 IN

## 2023-04-27 DIAGNOSIS — H66.002 NON-RECURRENT ACUTE SUPPURATIVE OTITIS MEDIA OF LEFT EAR WITHOUT SPONTANEOUS RUPTURE OF TYMPANIC MEMBRANE: Primary | ICD-10-CM

## 2023-04-27 PROCEDURE — 3075F SYST BP GE 130 - 139MM HG: CPT | Performed by: STUDENT IN AN ORGANIZED HEALTH CARE EDUCATION/TRAINING PROGRAM

## 2023-04-27 PROCEDURE — 3078F DIAST BP <80 MM HG: CPT | Performed by: STUDENT IN AN ORGANIZED HEALTH CARE EDUCATION/TRAINING PROGRAM

## 2023-04-27 PROCEDURE — 1036F TOBACCO NON-USER: CPT | Performed by: STUDENT IN AN ORGANIZED HEALTH CARE EDUCATION/TRAINING PROGRAM

## 2023-04-27 PROCEDURE — 99213 OFFICE O/P EST LOW 20 MIN: CPT | Performed by: STUDENT IN AN ORGANIZED HEALTH CARE EDUCATION/TRAINING PROGRAM

## 2023-04-27 PROCEDURE — G8427 DOCREV CUR MEDS BY ELIG CLIN: HCPCS | Performed by: STUDENT IN AN ORGANIZED HEALTH CARE EDUCATION/TRAINING PROGRAM

## 2023-04-27 PROCEDURE — G8417 CALC BMI ABV UP PARAM F/U: HCPCS | Performed by: STUDENT IN AN ORGANIZED HEALTH CARE EDUCATION/TRAINING PROGRAM

## 2023-04-27 RX ORDER — AMOXICILLIN 500 MG/1
500 CAPSULE ORAL 2 TIMES DAILY
Qty: 20 CAPSULE | Refills: 0 | Status: SHIPPED | OUTPATIENT
Start: 2023-04-27 | End: 2023-05-07

## 2023-04-27 NOTE — PROGRESS NOTES
Subjective     Yanet Rizvi, 40 y.o. female, presents with left ear pain, fever, plugged sensation in the left ear, and headache . Symptoms started 6 days ago and has been worsening. She is taking fluids well. There are no other significant complaints. Objective     /75   Pulse 81   Temp 97.5 °F (36.4 °C)   Ht 5' 5\" (1.651 m)   Wt 298 lb (135.2 kg)   BMI 49.59 kg/m²     General appearance:  well developed and well nourished   Nasal:  Neck:  Mild nasal congestion with clear rhinorrhea  Neck is supple   Ears:  External ears are normal  Right TM - normal landmarks and mobility  Left TM - erythematous, bulging, and purulent middle ear fluid   Oropharynx:  Mucous membranes are moist; there is mild erythema of the posterior pharynx   Lungs:  Lungs are clear to auscultation   Heart:  Regular rate and rhythm; no murmurs or rubs   Skin:  No rashes or lesions noted     Assessment     Acute left otitis media    Plan     1) Antibiotics per orders  2) Fluids, acetaminophen as needed  3) Recheck if symptoms persist for 2 or more days, symptoms worsen, or new symptoms develop. Referred To Mid-Level For Closure Text (Leave Blank If You Do Not Want): After obtaining clear surgical margins the patient was sent to a mid-level provider for surgical repair.  The patient understands they will receive post-surgical care and follow-up from the mid-level provider.

## 2023-05-30 RX ORDER — ACYCLOVIR 200 MG/1
CAPSULE ORAL
Qty: 60 CAPSULE | Refills: 0 | Status: SHIPPED | OUTPATIENT
Start: 2023-05-30

## 2023-05-31 DIAGNOSIS — M05.79 RHEUMATOID ARTHRITIS INVOLVING MULTIPLE SITES WITH POSITIVE RHEUMATOID FACTOR (HCC): ICD-10-CM

## 2023-05-31 RX ORDER — PREDNISONE 10 MG/1
10 TABLET ORAL DAILY
Qty: 35 TABLET | Refills: 1 | Status: SHIPPED | OUTPATIENT
Start: 2023-05-31

## 2023-05-31 RX ORDER — FUROSEMIDE 20 MG/1
20 TABLET ORAL DAILY
Qty: 20 TABLET | Refills: 1 | Status: SHIPPED | OUTPATIENT
Start: 2023-05-31

## 2023-05-31 NOTE — TELEPHONE ENCOUNTER
Medication:   Requested Prescriptions     Pending Prescriptions Disp Refills    furosemide (LASIX) 20 MG tablet [Pharmacy Med Name: FUROSEMIDE 20MG TABLETS] 20 tablet 1     Sig: TAKE 1 TABLET BY MOUTH DAILY    predniSONE (DELTASONE) 10 MG tablet [Pharmacy Med Name: PREDNISONE 10MG** TABLETS] 35 tablet 1     Sig: TAKE 1 TABLET BY MOUTH DAILY        Last Filled:  04/26/23    Patient Phone Number: [phone] (home)     Last appt: 4/27/2023   Next appt: Visit date not found    Last OARRS: No flowsheet data found.

## 2023-06-05 ENCOUNTER — OFFICE VISIT (OUTPATIENT)
Dept: ENDOCRINOLOGY | Age: 38
End: 2023-06-05
Payer: COMMERCIAL

## 2023-06-05 VITALS
DIASTOLIC BLOOD PRESSURE: 80 MMHG | HEIGHT: 65 IN | WEIGHT: 288 LBS | BODY MASS INDEX: 47.98 KG/M2 | HEART RATE: 86 BPM | SYSTOLIC BLOOD PRESSURE: 130 MMHG | OXYGEN SATURATION: 97 %

## 2023-06-05 DIAGNOSIS — E10.69 DYSLIPIDEMIA DUE TO TYPE 1 DIABETES MELLITUS (HCC): ICD-10-CM

## 2023-06-05 DIAGNOSIS — E66.9 TYPE 1 DIABETES MELLITUS WITH OBESITY (HCC): ICD-10-CM

## 2023-06-05 DIAGNOSIS — E10.42 POORLY CONTROLLED TYPE 1 DIABETES MELLITUS WITH PERIPHERAL NEUROPATHY (HCC): ICD-10-CM

## 2023-06-05 DIAGNOSIS — E78.5 DYSLIPIDEMIA DUE TO TYPE 1 DIABETES MELLITUS (HCC): ICD-10-CM

## 2023-06-05 DIAGNOSIS — E10.69 TYPE 1 DIABETES MELLITUS WITH OBESITY (HCC): ICD-10-CM

## 2023-06-05 DIAGNOSIS — E10.29 TYPE 1 DIABETES MELLITUS WITH MICROALBUMINURIA (HCC): ICD-10-CM

## 2023-06-05 DIAGNOSIS — R80.9 TYPE 1 DIABETES MELLITUS WITH MICROALBUMINURIA (HCC): ICD-10-CM

## 2023-06-05 DIAGNOSIS — E10.65 POORLY CONTROLLED TYPE 1 DIABETES MELLITUS WITH PERIPHERAL NEUROPATHY (HCC): ICD-10-CM

## 2023-06-05 DIAGNOSIS — E66.9 TYPE 1 DIABETES MELLITUS WITH OBESITY (HCC): Primary | ICD-10-CM

## 2023-06-05 DIAGNOSIS — E10.69 TYPE 1 DIABETES MELLITUS WITH OBESITY (HCC): Primary | ICD-10-CM

## 2023-06-05 LAB
ALBUMIN SERPL-MCNC: 3.6 G/DL (ref 3.4–5)
ALBUMIN/GLOB SERPL: 1.4 {RATIO} (ref 1.1–2.2)
ALP SERPL-CCNC: 114 U/L (ref 40–129)
ALT SERPL-CCNC: 40 U/L (ref 10–40)
ANION GAP SERPL CALCULATED.3IONS-SCNC: 13 MMOL/L (ref 3–16)
AST SERPL-CCNC: 28 U/L (ref 15–37)
BILIRUB SERPL-MCNC: 0.3 MG/DL (ref 0–1)
BUN SERPL-MCNC: 13 MG/DL (ref 7–20)
CALCIUM SERPL-MCNC: 9.4 MG/DL (ref 8.3–10.6)
CHLORIDE SERPL-SCNC: 98 MMOL/L (ref 99–110)
CHOLEST SERPL-MCNC: 182 MG/DL (ref 0–199)
CO2 SERPL-SCNC: 24 MMOL/L (ref 21–32)
CREAT SERPL-MCNC: 0.6 MG/DL (ref 0.6–1.1)
CREAT UR-MCNC: 49.3 MG/DL (ref 28–259)
EST. AVERAGE GLUCOSE BLD GHB EST-MCNC: 211.6 MG/DL
GFR SERPLBLD CREATININE-BSD FMLA CKD-EPI: >60 ML/MIN/{1.73_M2}
GLUCOSE SERPL-MCNC: 285 MG/DL (ref 70–99)
HBA1C MFR BLD: 9 %
HDLC SERPL-MCNC: 35 MG/DL (ref 40–60)
LDL CHOLESTEROL CALCULATED: 127 MG/DL
MICROALBUMIN UR DL<=1MG/L-MCNC: 2 MG/DL
MICROALBUMIN/CREAT UR: 40.6 MG/G (ref 0–30)
POTASSIUM SERPL-SCNC: 4.1 MMOL/L (ref 3.5–5.1)
PROT SERPL-MCNC: 6.2 G/DL (ref 6.4–8.2)
SODIUM SERPL-SCNC: 135 MMOL/L (ref 136–145)
TRIGL SERPL-MCNC: 98 MG/DL (ref 0–150)
TSH SERPL DL<=0.005 MIU/L-ACNC: 1.29 UIU/ML (ref 0.27–4.2)
VLDLC SERPL CALC-MCNC: 20 MG/DL

## 2023-06-05 PROCEDURE — 99214 OFFICE O/P EST MOD 30 MIN: CPT | Performed by: INTERNAL MEDICINE

## 2023-06-05 PROCEDURE — G8417 CALC BMI ABV UP PARAM F/U: HCPCS | Performed by: INTERNAL MEDICINE

## 2023-06-05 PROCEDURE — 95251 CONT GLUC MNTR ANALYSIS I&R: CPT | Performed by: INTERNAL MEDICINE

## 2023-06-05 PROCEDURE — 2022F DILAT RTA XM EVC RTNOPTHY: CPT | Performed by: INTERNAL MEDICINE

## 2023-06-05 PROCEDURE — 1036F TOBACCO NON-USER: CPT | Performed by: INTERNAL MEDICINE

## 2023-06-05 PROCEDURE — G8427 DOCREV CUR MEDS BY ELIG CLIN: HCPCS | Performed by: INTERNAL MEDICINE

## 2023-06-05 PROCEDURE — 3046F HEMOGLOBIN A1C LEVEL >9.0%: CPT | Performed by: INTERNAL MEDICINE

## 2023-06-05 PROCEDURE — 3075F SYST BP GE 130 - 139MM HG: CPT | Performed by: INTERNAL MEDICINE

## 2023-06-05 PROCEDURE — 3079F DIAST BP 80-89 MM HG: CPT | Performed by: INTERNAL MEDICINE

## 2023-06-05 NOTE — PATIENT INSTRUCTIONS
Change Lantus to 100 units in am    Change Humalog to 35 units for breakfast, lunch,  dinner.  15 units for sandwich or snacks     Correction scale                   With meals (during the day)      < 150 ---     0 units                                  151-200 --  3 units                                  201-250 :    6 units                                   251-300:     9 units                                   301-350:     12 units                                   >350 :         15 units Yes (specify)

## 2023-06-05 NOTE — PROGRESS NOTES
Rfl: 5    Continuous Blood Gluc Transmit (DEXCOM G6 TRANSMITTER) MISC, Change every 3 months, Disp: 1 each, Rfl: 3    Insulin Syringe-Needle U-100 ([employer] INS SYR .3CC/29G) 29G X 1/2\" 0.3 ML MISC, 1 each by Does not apply route daily, Disp: 300 each, Rfl: 3    lisinopril (PRINIVIL;ZESTRIL) 10 MG tablet, TAKE 1 TABLET BY MOUTH DAILY, Disp: 90 tablet, Rfl: 1    folic acid (FOLVITE) 1 MG tablet, Take 1 tab po daily. , Disp: 90 tablet, Rfl: 3    ezetimibe (ZETIA) 10 MG tablet, Take 1 tablet by mouth daily, Disp: 30 tablet, Rfl: 3    Continuous Blood Gluc  (DEXCOM G6 ) CRYSTAL, Use for personal CGM, Disp: 1 each, Rfl: 0    rosuvastatin (CRESTOR) 10 MG tablet, Take 1 tablet by mouth daily, Disp: 90 tablet, Rfl: 1    Lancets MISC, 1 each by Does not apply route 2 times daily, Disp: 100 each, Rfl: 5    insulin glargine (LANTUS SOLOSTAR) 100 UNIT/ML injection pen, Inject 100 Units into the skin nightly, Disp: 30 mL, Rfl: 5    hydroxychloroquine (PLAQUENIL) 200 MG tablet, TAKE 1 TABLET BY MOUTH TWICE DAILY, Disp: 60 tablet, Rfl: 1      Review of Systems:    Constitutional: Negative for fever, chills, and unexpected weight change. HENT: Negative for congestion, ear pain, rhinorrhea,  sore throat and trouble swallowing. Eyes: Negative for photophobia, redness, itching. Respiratory: Negative for cough, shortness of breath and sputum. Cardiovascular: Negative for chest pain, palpitations and leg swelling. Gastrointestinal: Negative for nausea, vomiting, abdominal pain, diarrhea, constipation. Endocrine: Negative for cold intolerance, heat intolerance, polydipsia, polyphagia and polyuria. Genitourinary: Negative for dysuria, urgency, frequency, hematuria and flank pain. Musculoskeletal: Negative for myalgias, back pain, arthralgias and neck pain. Skin/Nail: Negative for rash, itching. Normal nails. Neurological: Negative for seizures, weakness, light-headedness, numbness and headaches.

## 2023-06-21 DIAGNOSIS — R80.9 TYPE 1 DIABETES MELLITUS WITH MICROALBUMINURIA (HCC): ICD-10-CM

## 2023-06-21 DIAGNOSIS — E10.29 TYPE 1 DIABETES MELLITUS WITH MICROALBUMINURIA (HCC): ICD-10-CM

## 2023-06-21 RX ORDER — INSULIN LISPRO 100 [IU]/ML
INJECTION, SOLUTION INTRAVENOUS; SUBCUTANEOUS
Qty: 40 ML | Refills: 0 | Status: SHIPPED | OUTPATIENT
Start: 2023-06-21

## 2023-06-21 NOTE — TELEPHONE ENCOUNTER
Patient requesting refill on medication     Patient is completely out    insulin lispro (HUMALOG) 100 UNIT/ML SOLN injection vial       [facility] #[id number] [address], [address] [facility]   Phone:  [phone]  Fax:  [phone]

## 2023-06-26 DIAGNOSIS — M05.79 RHEUMATOID ARTHRITIS INVOLVING MULTIPLE SITES WITH POSITIVE RHEUMATOID FACTOR (HCC): ICD-10-CM

## 2023-06-26 RX ORDER — ACYCLOVIR 200 MG/1
CAPSULE ORAL
Qty: 60 CAPSULE | Refills: 0 | Status: SHIPPED | OUTPATIENT
Start: 2023-06-26

## 2023-07-07 DIAGNOSIS — M05.79 RHEUMATOID ARTHRITIS INVOLVING MULTIPLE SITES WITH POSITIVE RHEUMATOID FACTOR (HCC): ICD-10-CM

## 2023-07-10 RX ORDER — FOLIC ACID 1 MG/1
TABLET ORAL
Qty: 90 TABLET | Refills: 3 | Status: SHIPPED | OUTPATIENT
Start: 2023-07-10

## 2023-07-10 RX ORDER — FUROSEMIDE 20 MG/1
20 TABLET ORAL DAILY
Qty: 20 TABLET | Refills: 1 | Status: SHIPPED | OUTPATIENT
Start: 2023-07-10

## 2023-07-10 NOTE — TELEPHONE ENCOUNTER
Medication:   Requested Prescriptions     Pending Prescriptions Disp Refills    folic acid (FOLVITE) 1 MG tablet [Pharmacy Med Name: FOLIC ACID 1MG TABLETS] 90 tablet 3     Sig: TAKE 1 TABLET BY MOUTH DAILY    furosemide (LASIX) 20 MG tablet [Pharmacy Med Name: FUROSEMIDE 20MG TABLETS] 20 tablet 1     Sig: TAKE 1 TABLET BY MOUTH DAILY        Last Filled:  05/31/23    Patient Phone Number: [phone] (home)     Last appt: 4/27/2023   Next appt: Visit date not found    Last OARRS: No flowsheet data found.

## 2023-07-25 DIAGNOSIS — E66.9 TYPE 1 DIABETES MELLITUS WITH OBESITY (HCC): ICD-10-CM

## 2023-07-25 DIAGNOSIS — E10.69 TYPE 1 DIABETES MELLITUS WITH OBESITY (HCC): ICD-10-CM

## 2023-07-25 DIAGNOSIS — E10.29 TYPE 1 DIABETES MELLITUS WITH MICROALBUMINURIA (HCC): ICD-10-CM

## 2023-07-25 DIAGNOSIS — R80.9 TYPE 1 DIABETES MELLITUS WITH MICROALBUMINURIA (HCC): ICD-10-CM

## 2023-07-26 RX ORDER — INSULIN GLARGINE 100 [IU]/ML
INJECTION, SOLUTION SUBCUTANEOUS
Qty: 30 ML | Refills: 5 | Status: SHIPPED | OUTPATIENT
Start: 2023-07-26

## 2023-07-26 NOTE — TELEPHONE ENCOUNTER
Medication:   Requested Prescriptions     Pending Prescriptions Disp Refills    LANTUS SOLOSTAR 100 UNIT/ML injection pen [Pharmacy Med Name: LANTUS SOLOSTAR PEN INJ 3ML] 30 mL 5     Sig: ADMINISTER 100 UNITS UNDER THE SKIN EVERY NIGHT       Last Filled:  10/17/22    Patient Phone Number: [phone] (home)     Last appt: 4/27/2023 Return in about 6 weeks (around 5/16/2023) for Diabetes.   Next appt: Visit date not found    Last Labs DM:   Lab Results   Component Value Date/Time    LABA1C 9.0 06/05/2023 09:43 AM

## 2023-07-28 ENCOUNTER — TELEPHONE (OUTPATIENT)
Dept: ENDOCRINOLOGY | Age: 38
End: 2023-07-28

## 2023-07-28 DIAGNOSIS — E10.29 TYPE 1 DIABETES MELLITUS WITH MICROALBUMINURIA (HCC): Primary | ICD-10-CM

## 2023-07-28 DIAGNOSIS — R80.9 TYPE 1 DIABETES MELLITUS WITH MICROALBUMINURIA (HCC): Primary | ICD-10-CM

## 2023-07-28 RX ORDER — INSULIN LISPRO 100 [IU]/ML
INJECTION, SOLUTION INTRAVENOUS; SUBCUTANEOUS
Qty: 40 ML | Refills: 0 | Status: SHIPPED | OUTPATIENT
Start: 2023-07-28

## 2023-07-28 NOTE — TELEPHONE ENCOUNTER
Patient called and said that she just recently saw her endocrinologist and has been calling the office for the last 3 days to get her insulin prescription, I do not see any phone notes but I went ahead and refilled her prescription for now.  Patient was recently seen on June 25, 2023

## 2023-07-31 NOTE — TELEPHONE ENCOUNTER
Looks like she was calling cp for refills   Secondly she cancelled last week   She needs to make an appointment tomorrow for continuation of care and meds

## 2023-08-03 ENCOUNTER — OFFICE VISIT (OUTPATIENT)
Dept: PRIMARY CARE CLINIC | Age: 38
End: 2023-08-03

## 2023-08-03 VITALS
WEIGHT: 292 LBS | TEMPERATURE: 97.4 F | HEIGHT: 65 IN | HEART RATE: 84 BPM | OXYGEN SATURATION: 97 % | SYSTOLIC BLOOD PRESSURE: 131 MMHG | BODY MASS INDEX: 48.65 KG/M2 | DIASTOLIC BLOOD PRESSURE: 82 MMHG

## 2023-08-03 DIAGNOSIS — H10.33 ACUTE CONJUNCTIVITIS OF BOTH EYES, UNSPECIFIED ACUTE CONJUNCTIVITIS TYPE: Primary | ICD-10-CM

## 2023-08-03 RX ORDER — BACITRACIN ZINC AND POLYMYXIN B SULFATE 500; 10000 [USP'U]/G; [USP'U]/G
OINTMENT OPHTHALMIC 2 TIMES DAILY
Qty: 3.5 G | Refills: 0 | Status: SHIPPED | OUTPATIENT
Start: 2023-08-03 | End: 2023-08-13

## 2023-08-03 ASSESSMENT — ENCOUNTER SYMPTOMS
EYE REDNESS: 1
COUGH: 0
EYE DISCHARGE: 1
SORE THROAT: 0
EYE PAIN: 1

## 2023-08-03 NOTE — PROGRESS NOTES
[facility]. PRIMARY CARE  [address]  [address] [place] [address]  Dept: [phone]  Dept Fax: [phone]     8/3/2023      Marisa Nj   1985     Chief Complaint   Patient presents with    Conjunctivitis       HPI     Patient presents with complaint of bilateral eye redness, burning, and discharge. States when she wakes up in the morning discharge is thick but she doesn't notice it as much during the day. She was treated for pink eye a few months ago but states it never fully went away but did improve when she was using the ointment. She did continue to use her same contacts. Denies any other symptoms at this time. PHQ Scores 1/11/2023 3/10/2022 1/14/2021   PHQ2 Score 0 0 0   PHQ9 Score 0 0 0     Interpretation of Total Score Depression Severity: 1-4 = Minimal depression, 5-9 = Mild depression, 10-14 = Moderate depression, 15-19 = Moderately severe depression, 20-27 = Severe depression     Prior to Visit Medications    Medication Sig Taking? Authorizing Provider   bacitracin-polymyxin b (POLYSPORIN) [id number] UNIT/GM ophthalmic ointment Place into both eyes 2 times daily for 10 days Every 12 hours.  Yes AYE Linda - CNP   insulin lispro (HUMALOG) 100 UNIT/ML SOLN injection vial Inject 35 units three times daily with meals and 15 units for a snack daily Yes Uriel Amaro MD   LANTUS SOLOSTAR 100 UNIT/ML injection pen ADMINISTER 100 UNITS UNDER THE SKIN EVERY NIGHT Yes Chapincito Mdoi DO   folic acid (FOLVITE) 1 MG tablet TAKE 1 TABLET BY MOUTH DAILY Yes Chapincito Modi DO   furosemide (LASIX) 20 MG tablet TAKE 1 TABLET BY MOUTH DAILY Yes Chapincito Modi DO   methotrexate (RHEUMATREX) 2.5 MG chemo tablet TAKE 8 TABLETS BY MOUTH ONCE WEEKLY, ON THE SAME DAY EACH WEEK Yes Chapincito Modi DO   acyclovir (ZOVIRAX) 200 MG capsule TAKE 1 CAPSULE BY MOUTH EVERY 4 HOURS WHILE AWAKE FOR 10 DAYS Yes Chapincito Modi DO   predniSONE (DELTASONE) 10 MG tablet TAKE 1 TABLET BY MOUTH DAILY

## 2023-08-11 DIAGNOSIS — R80.9 TYPE 1 DIABETES MELLITUS WITH MICROALBUMINURIA (HCC): ICD-10-CM

## 2023-08-11 DIAGNOSIS — E11.59 HYPERTENSION ASSOCIATED WITH DIABETES (HCC): ICD-10-CM

## 2023-08-11 DIAGNOSIS — M05.79 RHEUMATOID ARTHRITIS INVOLVING MULTIPLE SITES WITH POSITIVE RHEUMATOID FACTOR (HCC): ICD-10-CM

## 2023-08-11 DIAGNOSIS — E10.29 TYPE 1 DIABETES MELLITUS WITH MICROALBUMINURIA (HCC): ICD-10-CM

## 2023-08-11 DIAGNOSIS — I15.2 HYPERTENSION ASSOCIATED WITH DIABETES (HCC): ICD-10-CM

## 2023-08-14 RX ORDER — ACYCLOVIR 200 MG/1
CAPSULE ORAL
Qty: 60 CAPSULE | Refills: 0 | Status: SHIPPED | OUTPATIENT
Start: 2023-08-14

## 2023-08-14 RX ORDER — LISINOPRIL 10 MG/1
10 TABLET ORAL DAILY
Qty: 90 TABLET | Refills: 1 | Status: SHIPPED | OUTPATIENT
Start: 2023-08-14

## 2023-08-14 NOTE — TELEPHONE ENCOUNTER
Medication:   Requested Prescriptions     Pending Prescriptions Disp Refills    acyclovir (ZOVIRAX) 200 MG capsule [Pharmacy Med Name: ACYCLOVIR 200MG CAPSULES] 60 capsule 0     Sig: TAKE 1 CAPSULE BY MOUTH EVERY 4 HOURS WHILE AWAKE FOR 10 DAYS    methotrexate (RHEUMATREX) 2.5 MG chemo tablet [Pharmacy Med Name: METHOTREXATE 2.5MG TABLETS - YELLOW] 35 tablet 0     Sig: TAKE 8 TABLETS BY MOUTH ONCE WEEKLY ON THE SAME DAY [address] [place] [address]    lisinopril (PRINIVIL;ZESTRIL) 10 MG tablet [Pharmacy Med Name: LISINOPRIL 10MG TABLETS] 90 tablet 1     Sig: TAKE 1 TABLET BY MOUTH DAILY        Last Filled:  06/26/23 and 08/22/22    Patient Phone Number: [phone] (home)     Last appt: 8/3/2023 Return if symptoms worsen or fail to improve. Next appt: Visit date not found    Last OARRS: No flowsheet data found.

## 2023-08-21 ENCOUNTER — TELEMEDICINE (OUTPATIENT)
Dept: ENDOCRINOLOGY | Age: 38
End: 2023-08-21
Payer: COMMERCIAL

## 2023-08-21 DIAGNOSIS — E10.29 TYPE 1 DIABETES MELLITUS WITH MICROALBUMINURIA (HCC): ICD-10-CM

## 2023-08-21 DIAGNOSIS — E10.69 DYSLIPIDEMIA DUE TO TYPE 1 DIABETES MELLITUS (HCC): ICD-10-CM

## 2023-08-21 DIAGNOSIS — E78.5 DYSLIPIDEMIA DUE TO TYPE 1 DIABETES MELLITUS (HCC): ICD-10-CM

## 2023-08-21 DIAGNOSIS — R80.9 TYPE 1 DIABETES MELLITUS WITH MICROALBUMINURIA (HCC): ICD-10-CM

## 2023-08-21 DIAGNOSIS — E10.69 TYPE 1 DIABETES MELLITUS WITH OBESITY (HCC): Primary | ICD-10-CM

## 2023-08-21 DIAGNOSIS — E66.9 TYPE 1 DIABETES MELLITUS WITH OBESITY (HCC): Primary | ICD-10-CM

## 2023-08-21 PROCEDURE — G8427 DOCREV CUR MEDS BY ELIG CLIN: HCPCS | Performed by: INTERNAL MEDICINE

## 2023-08-21 PROCEDURE — 3052F HG A1C>EQUAL 8.0%<EQUAL 9.0%: CPT | Performed by: INTERNAL MEDICINE

## 2023-08-21 PROCEDURE — 99214 OFFICE O/P EST MOD 30 MIN: CPT | Performed by: INTERNAL MEDICINE

## 2023-08-21 PROCEDURE — 2022F DILAT RTA XM EVC RTNOPTHY: CPT | Performed by: INTERNAL MEDICINE

## 2023-08-21 RX ORDER — SEMAGLUTIDE 0.68 MG/ML
INJECTION, SOLUTION SUBCUTANEOUS
Qty: 3 ML | Refills: 1 | Status: SHIPPED | OUTPATIENT
Start: 2023-08-21

## 2023-08-21 RX ORDER — INSULIN LISPRO 100 [IU]/ML
INJECTION, SOLUTION INTRAVENOUS; SUBCUTANEOUS
Qty: 40 ML | Refills: 1 | Status: SHIPPED | OUTPATIENT
Start: 2023-08-21

## 2023-08-21 NOTE — PROGRESS NOTES
Semaglutide,0.25 or 0.5MG/DOS, (OZEMPIC, 0.25 OR 0.5 MG/DOSE,) 2 MG/3ML SOPN; 25 mg once a week    Dyslipidemia due to type 1 diabetes mellitus ([address])  -     Semaglutide,0.25 or 0.5MG/DOS, (OZEMPIC, 0.25 OR 0.5 MG/DOSE,) 2 MG/3ML SOPN; 25 mg once a week              1: Type 1 DM complicated wit nephropathy , hypoglycemias , neuropathy   Uncontrolled A1C 9% < 9.8% < 7.7%     Very high variability   Hypoglycemias present   Brittle diabetes   She has dawns phenomenon        C/w Lantus 100 units at bedtime    C/w Humalog 35 units for breakfast, lunch,  dinner. 15 units for sandwich or snacks     Correction scale                   With meals (during the day)      < 150 ---     0 units                                  151-200 --  3 units                                  201-250 :    6 units                                   251-300:     9 units                                   301-350:     12 units                                   >350 :         15 units                                      Continue with DEXCOM   Consider Tandem with Humalog U200   I sent an email to [address] [place] [address] 0.25 mg weekly   She knows it is not FDA approved   It may hep wit weight loss, appetite control      All instructions provided in written. Check Blood sugars 3-4 times per day. Log them along with insulin and send them every 2 weeks. Call for blood sugars less than 60 or more than 400. Eye exam: Last exam sometime ago. Referral done   Foot exam:  June 2023   Deformity/amputation: absent  Skin lesions/pre-ulcerative calluses: absent  Edema: right- negative, left- negative  Sensory exam: Monofilament sensation: normal  Pulses: normal, Vibration (128 Hz): severely reduced     Renal screen: 142 > 166   TSH screen:   Saw CDE in past, not interested at this time     2: HTN   Controlled     3: Hyperlipidemia   LDL: 127 < 111, HDL: 35, TGs: 98 - June 2023    crestor 10 mg daily - stopped due to facial flushing.    C/w zetia 10 mg daily

## 2023-08-24 ENCOUNTER — TELEPHONE (OUTPATIENT)
Dept: ENDOCRINOLOGY | Age: 38
End: 2023-08-24

## 2023-08-24 NOTE — TELEPHONE ENCOUNTER
Submitted PA for Ozempic  [facility] Key: [id number] STATUS: PENDING. Follow up done daily; if no response in three days we will refax for status check. If another three days goes by with no response we will call the insurance for status.

## 2023-09-01 DIAGNOSIS — R80.9 TYPE 1 DIABETES MELLITUS WITH MICROALBUMINURIA (HCC): ICD-10-CM

## 2023-09-01 DIAGNOSIS — E10.29 TYPE 1 DIABETES MELLITUS WITH MICROALBUMINURIA (HCC): ICD-10-CM

## 2023-09-05 RX ORDER — INSULIN LISPRO 100 [IU]/ML
INJECTION, SOLUTION INTRAVENOUS; SUBCUTANEOUS
Qty: 40 ML | Refills: 1 | OUTPATIENT
Start: 2023-09-05

## 2023-09-05 NOTE — TELEPHONE ENCOUNTER
Pt called back and states she called her insurance to see what they would cover as a alternative to Ozempic. So they will cover Trulicity.  Send to [id number] [facility] on [address] [address]    # [phone]

## 2023-09-06 RX ORDER — DULAGLUTIDE 0.75 MG/.5ML
0.75 INJECTION, SOLUTION SUBCUTANEOUS WEEKLY
OUTPATIENT
Start: 2023-09-06

## 2023-09-06 NOTE — TELEPHONE ENCOUNTER
Senthil Selam Akersdinand informed : None will be covered as she is type 1   These meds are for type 2.  I told her the same on LOV

## 2023-09-13 ENCOUNTER — TELEPHONE (OUTPATIENT)
Dept: ENDOCRINOLOGY | Age: 38
End: 2023-09-13

## 2023-09-13 NOTE — TELEPHONE ENCOUNTER
Pt called stated she spoke to Leo from [facility] for her insulin pump.  He told pt that they need the 2 office notes             [phone]

## 2023-09-14 ENCOUNTER — TELEPHONE (OUTPATIENT)
Dept: ENDOCRINOLOGY | Age: 38
End: 2023-09-14

## 2023-09-14 DIAGNOSIS — M05.79 RHEUMATOID ARTHRITIS INVOLVING MULTIPLE SITES WITH POSITIVE RHEUMATOID FACTOR (HCC): ICD-10-CM

## 2023-09-14 NOTE — TELEPHONE ENCOUNTER
Statement of medical necessity and RX order form for Tandem pump completed and faxed ( confirmation scanned )

## 2023-09-15 RX ORDER — PREDNISONE 10 MG/1
10 TABLET ORAL DAILY
Qty: 35 TABLET | Refills: 1 | Status: SHIPPED | OUTPATIENT
Start: 2023-09-15

## 2023-09-15 RX ORDER — ACYCLOVIR 200 MG/1
CAPSULE ORAL
Qty: 60 CAPSULE | Refills: 0 | Status: SHIPPED | OUTPATIENT
Start: 2023-09-15

## 2023-09-15 NOTE — TELEPHONE ENCOUNTER
Medication:   Requested Prescriptions     Pending Prescriptions Disp Refills    predniSONE (DELTASONE) 10 MG tablet [Pharmacy Med Name: PREDNISONE 10MG** TABLETS] 35 tablet 1     Sig: TAKE 1 TABLET BY MOUTH DAILY    acyclovir (ZOVIRAX) 200 MG capsule [Pharmacy Med Name: ACYCLOVIR 200MG CAPSULES] 60 capsule 0     Sig: TAKE 1 CAPSULE BY MOUTH EVERY 4 HOURS WHILE AWAKE FOR 10 DAYS        Last Filled:  05/31/23, 08/14/23    Patient Phone Number: [phone] (home)     Last appt: 8/3/2023 Return in about 6 weeks (around 5/16/2023) for Diabetes.   Next appt: Visit date not found    Last OARRS:        No data to display

## 2023-09-21 ENCOUNTER — TELEPHONE (OUTPATIENT)
Dept: ENDOCRINOLOGY | Age: 38
End: 2023-09-21

## 2023-09-27 RX ORDER — HYDROCHLOROTHIAZIDE 25 MG/1
25 TABLET ORAL EVERY MORNING
Qty: 30 TABLET | Refills: 5 | Status: SHIPPED | OUTPATIENT
Start: 2023-09-27

## 2023-09-27 NOTE — TELEPHONE ENCOUNTER
Medication:   Requested Prescriptions     Pending Prescriptions Disp Refills    hydroCHLOROthiazide (HYDRODIURIL) 25 MG tablet [Pharmacy Med Name: HYDROCHLOROTHIAZIDE 25MG TABLETS] 30 tablet      Sig: TAKE 1 TABLET BY MOUTH EVERY MORNING        Last Filled:  4/4/23    Patient Phone Number: [phone] (home)     Last appt: 8/3/2023   Next appt: Visit date not found    Last OARRS:        No data to display

## 2023-10-03 ENCOUNTER — TELEPHONE (OUTPATIENT)
Dept: ENDOCRINOLOGY | Age: 38
End: 2023-10-03

## 2023-10-05 DIAGNOSIS — E10.29 TYPE 1 DIABETES MELLITUS WITH MICROALBUMINURIA (HCC): Primary | ICD-10-CM

## 2023-10-05 DIAGNOSIS — E10.69 TYPE 1 DIABETES MELLITUS WITH OBESITY (HCC): ICD-10-CM

## 2023-10-05 DIAGNOSIS — E66.9 TYPE 1 DIABETES MELLITUS WITH OBESITY (HCC): ICD-10-CM

## 2023-10-05 DIAGNOSIS — R80.9 TYPE 1 DIABETES MELLITUS WITH MICROALBUMINURIA (HCC): Primary | ICD-10-CM

## 2023-10-06 ENCOUNTER — TELEPHONE (OUTPATIENT)
Dept: ENDOCRINOLOGY | Age: 38
End: 2023-10-06

## 2023-10-06 NOTE — TELEPHONE ENCOUNTER
Submitted PA for Humalog  [facility]ia [facility] Key: [id number] STATUS: PENDING. Follow up done daily; if no response in three days we will refax for status check. If another three days goes by with no response we will call the insurance for status.

## 2023-10-13 ENCOUNTER — TELEPHONE (OUTPATIENT)
Dept: ENDOCRINOLOGY | Age: 38
End: 2023-10-13

## 2023-10-13 NOTE — TELEPHONE ENCOUNTER
Pt calling and states she needs the insulin to go w/ her new Tandem insulin pump.  Send to [facility] on [address] # [phone]

## 2023-10-29 DIAGNOSIS — R80.9 TYPE 1 DIABETES MELLITUS WITH MICROALBUMINURIA (HCC): ICD-10-CM

## 2023-10-29 DIAGNOSIS — E10.29 TYPE 1 DIABETES MELLITUS WITH MICROALBUMINURIA (HCC): ICD-10-CM

## 2023-10-30 RX ORDER — PROCHLORPERAZINE 25 MG/1
SUPPOSITORY RECTAL
Qty: 3 EACH | Refills: 5 | Status: SHIPPED | OUTPATIENT
Start: 2023-10-30

## 2023-11-10 ENCOUNTER — TELEPHONE (OUTPATIENT)
Dept: ENDOCRINOLOGY | Age: 38
End: 2023-11-10

## 2023-11-10 RX ORDER — ACYCLOVIR 200 MG/1
CAPSULE ORAL
Qty: 60 CAPSULE | Refills: 0 | Status: SHIPPED | OUTPATIENT
Start: 2023-11-10

## 2023-11-10 NOTE — TELEPHONE ENCOUNTER
Medication:   Requested Prescriptions     Pending Prescriptions Disp Refills    acyclovir (ZOVIRAX) 200 MG capsule [Pharmacy Med Name: ACYCLOVIR 200MG CAPSULES] 60 capsule 0     Sig: TAKE 1 CAPSULE BY MOUTH EVERY 4 HOURS WHILE AWAKE FOR 10 DAYS        Last Filled:  9/15/23    Patient Phone Number: [phone] (home)     Last appt: 8/3/2023   Next appt: Visit date not found    Last OARRS:        No data to display

## 2023-11-22 ENCOUNTER — TELEPHONE (OUTPATIENT)
Dept: ENDOCRINOLOGY | Age: 38
End: 2023-11-22

## 2023-11-22 NOTE — TELEPHONE ENCOUNTER
Mrs. Olamide Palmer informed her checklist for pump training has been received and reviewed by Dr. Priscila Darby. I explained he download and review report at her [facility] 12/7/2023. Advised her to keep the appointment. She expressed understanding and had no further questions or concerns.

## 2023-11-30 DIAGNOSIS — E11.59 HYPERTENSION ASSOCIATED WITH DIABETES (HCC): ICD-10-CM

## 2023-11-30 DIAGNOSIS — I15.2 HYPERTENSION ASSOCIATED WITH DIABETES (HCC): ICD-10-CM

## 2023-11-30 DIAGNOSIS — R80.9 TYPE 1 DIABETES MELLITUS WITH MICROALBUMINURIA (HCC): ICD-10-CM

## 2023-11-30 DIAGNOSIS — E10.29 TYPE 1 DIABETES MELLITUS WITH MICROALBUMINURIA (HCC): ICD-10-CM

## 2023-11-30 RX ORDER — LISINOPRIL 10 MG/1
10 TABLET ORAL DAILY
Qty: 90 TABLET | Refills: 1 | Status: SHIPPED | OUTPATIENT
Start: 2023-11-30

## 2023-11-30 NOTE — TELEPHONE ENCOUNTER
Medication:   Requested Prescriptions     Pending Prescriptions Disp Refills    lisinopril (PRINIVIL;ZESTRIL) 10 MG tablet [Pharmacy Med Name: LISINOPRIL 10MG TABLETS] 90 tablet 1     Sig: TAKE 1 TABLET BY MOUTH DAILY        Last Filled:  8/14/23    Last appt: 8/3/2023   Next appt: Visit date not found    Last OARRS:        No data to display

## 2023-12-11 NOTE — PROGRESS NOTES
Ump with Humalog U200 settings     Tandem settings   Basal rate (units per hour)   MN: 1.20    Correction:  MN - 30    Carb ratio:   MN - 8     Target 150    Active insulin time : 5 hours     Need pump download 2 weeks after starting it No

## 2023-12-14 RX ORDER — ACYCLOVIR 200 MG/1
CAPSULE ORAL
Qty: 60 CAPSULE | Refills: 0 | Status: SHIPPED | OUTPATIENT
Start: 2023-12-14

## 2023-12-14 NOTE — TELEPHONE ENCOUNTER
Medication:   Requested Prescriptions     Pending Prescriptions Disp Refills    acyclovir (ZOVIRAX) 200 MG capsule [Pharmacy Med Name: ACYCLOVIR 200MG CAPSULES] 60 capsule 0     Sig: TAKE 1 CAPSULE BY MOUTH EVERY 4 HOURS WHILE AWAKE FOR 10 DAYS        Last Filled:    11/10/23  Patient Phone Number: [phone] (home)     Last appt: 8/3/2023 Return if symptoms worsen or fail to improve.   Next appt: Visit date not found    Last OARRS:        No data to display

## 2023-12-17 DIAGNOSIS — E66.9 TYPE 1 DIABETES MELLITUS WITH OBESITY (HCC): ICD-10-CM

## 2023-12-17 DIAGNOSIS — R80.9 TYPE 1 DIABETES MELLITUS WITH MICROALBUMINURIA (HCC): ICD-10-CM

## 2023-12-17 DIAGNOSIS — E10.69 TYPE 1 DIABETES MELLITUS WITH OBESITY (HCC): ICD-10-CM

## 2023-12-17 DIAGNOSIS — E10.29 TYPE 1 DIABETES MELLITUS WITH MICROALBUMINURIA (HCC): ICD-10-CM

## 2023-12-25 DIAGNOSIS — M05.79 RHEUMATOID ARTHRITIS INVOLVING MULTIPLE SITES WITH POSITIVE RHEUMATOID FACTOR (HCC): ICD-10-CM

## 2023-12-27 RX ORDER — PREDNISONE 10 MG/1
10 TABLET ORAL DAILY
Qty: 35 TABLET | Refills: 1 | Status: SHIPPED | OUTPATIENT
Start: 2023-12-27

## 2023-12-27 NOTE — TELEPHONE ENCOUNTER
Medication:   Requested Prescriptions     Pending Prescriptions Disp Refills    predniSONE (DELTASONE) 10 MG tablet [Pharmacy Med Name: PREDNISONE 10MG** TABLETS] 35 tablet 1     Sig: TAKE 1 TABLET BY MOUTH DAILY        Last Filled:  9/15/23    Patient Phone Number: [phone] (home)     Last appt: 8/3/2023   Next appt: Visit date not found    Last OARRS:        No data to display

## 2024-01-05 ENCOUNTER — OFFICE VISIT (OUTPATIENT)
Dept: PRIMARY CARE CLINIC | Age: 39
End: 2024-01-05
Payer: COMMERCIAL

## 2024-01-05 VITALS
SYSTOLIC BLOOD PRESSURE: 135 MMHG | HEIGHT: 65 IN | BODY MASS INDEX: 46.52 KG/M2 | TEMPERATURE: 97.2 F | WEIGHT: 279.2 LBS | DIASTOLIC BLOOD PRESSURE: 75 MMHG | HEART RATE: 82 BPM

## 2024-01-05 DIAGNOSIS — J35.1 ENLARGED TONSILS: ICD-10-CM

## 2024-01-05 DIAGNOSIS — G47.33 OSA (OBSTRUCTIVE SLEEP APNEA): ICD-10-CM

## 2024-01-05 DIAGNOSIS — J04.0 LARYNGITIS: Primary | ICD-10-CM

## 2024-01-05 PROCEDURE — 3078F DIAST BP <80 MM HG: CPT | Performed by: STUDENT IN AN ORGANIZED HEALTH CARE EDUCATION/TRAINING PROGRAM

## 2024-01-05 PROCEDURE — 1036F TOBACCO NON-USER: CPT | Performed by: STUDENT IN AN ORGANIZED HEALTH CARE EDUCATION/TRAINING PROGRAM

## 2024-01-05 PROCEDURE — 3075F SYST BP GE 130 - 139MM HG: CPT | Performed by: STUDENT IN AN ORGANIZED HEALTH CARE EDUCATION/TRAINING PROGRAM

## 2024-01-05 PROCEDURE — 99213 OFFICE O/P EST LOW 20 MIN: CPT | Performed by: STUDENT IN AN ORGANIZED HEALTH CARE EDUCATION/TRAINING PROGRAM

## 2024-01-05 PROCEDURE — G8417 CALC BMI ABV UP PARAM F/U: HCPCS | Performed by: STUDENT IN AN ORGANIZED HEALTH CARE EDUCATION/TRAINING PROGRAM

## 2024-01-05 PROCEDURE — G8484 FLU IMMUNIZE NO ADMIN: HCPCS | Performed by: STUDENT IN AN ORGANIZED HEALTH CARE EDUCATION/TRAINING PROGRAM

## 2024-01-05 PROCEDURE — G8427 DOCREV CUR MEDS BY ELIG CLIN: HCPCS | Performed by: STUDENT IN AN ORGANIZED HEALTH CARE EDUCATION/TRAINING PROGRAM

## 2024-01-05 RX ORDER — PREDNISONE 20 MG/1
20 TABLET ORAL DAILY
Qty: 5 TABLET | Refills: 0 | Status: SHIPPED | OUTPATIENT
Start: 2024-01-05 | End: 2024-01-10

## 2024-01-05 RX ORDER — FLUOROMETHOLONE 0.1 %
SUSPENSION, DROPS(FINAL DOSAGE FORM)(ML) OPHTHALMIC (EYE)
COMMUNITY
Start: 2023-10-04

## 2024-01-05 ASSESSMENT — PATIENT HEALTH QUESTIONNAIRE - PHQ9
1. LITTLE INTEREST OR PLEASURE IN DOING THINGS: NOT AT ALL
1. LITTLE INTEREST OR PLEASURE IN DOING THINGS: 0
SUM OF ALL RESPONSES TO PHQ QUESTIONS 1-9: 0
2. FEELING DOWN, DEPRESSED OR HOPELESS: NOT AT ALL
SUM OF ALL RESPONSES TO PHQ QUESTIONS 1-9: 0
SUM OF ALL RESPONSES TO PHQ9 QUESTIONS 1 & 2: 0
2. FEELING DOWN, DEPRESSED OR HOPELESS: 0
SUM OF ALL RESPONSES TO PHQ QUESTIONS 1-9: 0
SUM OF ALL RESPONSES TO PHQ QUESTIONS 1-9: 0
SUM OF ALL RESPONSES TO PHQ9 QUESTIONS 1 & 2: 0

## 2024-01-05 ASSESSMENT — ENCOUNTER SYMPTOMS
SORE THROAT: 1
SINUS PAIN: 0
WHEEZING: 0
CHEST TIGHTNESS: 0
SHORTNESS OF BREATH: 0
COUGH: 1
SINUS PRESSURE: 0
VOICE CHANGE: 1
RHINORRHEA: 0

## 2024-01-05 NOTE — PROGRESS NOTES
5\").    Weight as of this encounter: 126.6 kg (279 lb 3.2 oz).    Physical Exam  Vitals reviewed.   Constitutional:       Appearance: Normal appearance.   HENT:      Head: Normocephalic and atraumatic.      Right Ear: Tympanic membrane, ear canal and external ear normal.      Left Ear: Ear canal and external ear normal.      Nose: Nose normal.      Mouth/Throat:      Mouth: Mucous membranes are moist.      Pharynx: Oropharynx is clear.   Eyes:      Extraocular Movements: Extraocular movements intact.      Conjunctiva/sclera: Conjunctivae normal.   Cardiovascular:      Rate and Rhythm: Normal rate and regular rhythm.      Pulses: Normal pulses.      Heart sounds: Normal heart sounds.   Pulmonary:      Effort: Pulmonary effort is normal.      Breath sounds: Normal breath sounds.   Musculoskeletal:         General: No deformity.   Skin:     General: Skin is warm and dry.      Capillary Refill: Capillary refill takes less than 2 seconds.   Neurological:      General: No focal deficit present.      Mental Status: She is alert. Mental status is at baseline.   Psychiatric:         Mood and Affect: Mood normal.         Behavior: Behavior normal.         Thought Content: Thought content normal.         Judgment: Judgment normal.         ASSESSMENT/PLAN:  1. Laryngitis  Laryngitis likely secondary to upper respiratory infection.  Explained expected course and symptomatic care with patient.  Short course of prednisone added to her daily prednisone.  Counseled on reasons to call or return to the office for additional evaluation.  Follow-up as needed.  - predniSONE (DELTASONE) 20 MG tablet; Take 1 tablet by mouth daily for 5 days  Dispense: 5 tablet; Refill: 0    Return if symptoms worsen or fail to improve.    An electronic signature was used to authenticate this note.    --Ronnell Méndez DO on 1/5/2024 at 9:05 AM

## 2024-01-10 DIAGNOSIS — E10.29 TYPE 1 DIABETES MELLITUS WITH MICROALBUMINURIA (HCC): ICD-10-CM

## 2024-01-10 DIAGNOSIS — E66.9 TYPE 1 DIABETES MELLITUS WITH OBESITY (HCC): ICD-10-CM

## 2024-01-10 DIAGNOSIS — E10.69 TYPE 1 DIABETES MELLITUS WITH OBESITY (HCC): ICD-10-CM

## 2024-01-10 DIAGNOSIS — R80.9 TYPE 1 DIABETES MELLITUS WITH MICROALBUMINURIA (HCC): ICD-10-CM

## 2024-01-10 RX ORDER — ACYCLOVIR 200 MG/1
CAPSULE ORAL
Qty: 60 CAPSULE | Refills: 0 | Status: SHIPPED | OUTPATIENT
Start: 2024-01-10

## 2024-01-10 NOTE — TELEPHONE ENCOUNTER
Medication:   Requested Prescriptions     Pending Prescriptions Disp Refills    acyclovir (ZOVIRAX) 200 MG capsule [Pharmacy Med Name: ACYCLOVIR 200MG CAPSULES] 60 capsule 0     Sig: TAKE 1 CAPSULE BY MOUTH EVERY 4 HOURS WHILE AWAKE FOR 10 DAYS        Last Filled: 12/14/23      Last appt: 1/5/2024   Next appt: Visit date not found    Last OARRS:        No data to display

## 2024-01-11 ENCOUNTER — E-VISIT (OUTPATIENT)
Dept: PRIMARY CARE CLINIC | Age: 39
End: 2024-01-11
Payer: COMMERCIAL

## 2024-01-11 ENCOUNTER — TELEPHONE (OUTPATIENT)
Dept: PRIMARY CARE CLINIC | Age: 39
End: 2024-01-11

## 2024-01-11 DIAGNOSIS — H10.9 BACTERIAL CONJUNCTIVITIS: Primary | ICD-10-CM

## 2024-01-11 PROCEDURE — 99421 OL DIG E/M SVC 5-10 MIN: CPT | Performed by: STUDENT IN AN ORGANIZED HEALTH CARE EDUCATION/TRAINING PROGRAM

## 2024-01-11 RX ORDER — INSULIN LISPRO 200 [IU]/ML
INJECTION, SOLUTION SUBCUTANEOUS
Qty: 30 ML | Refills: 0 | Status: SHIPPED | OUTPATIENT
Start: 2024-01-11

## 2024-01-11 RX ORDER — POLYMYXIN B SULFATE AND TRIMETHOPRIM 1; 10000 MG/ML; [USP'U]/ML
1 SOLUTION OPHTHALMIC EVERY 4 HOURS
Qty: 3 ML | Refills: 0 | Status: SHIPPED | OUTPATIENT
Start: 2024-01-11 | End: 2024-01-21

## 2024-01-11 NOTE — TELEPHONE ENCOUNTER
Medication:   Requested Prescriptions     Pending Prescriptions Disp Refills    insulin lispro (HUMALOG KWIKPEN) 200 UNIT/ML SOPN pen [Pharmacy Med Name: HUMALOG 200 U/ML KWIKPEN INJ 3ML] 30 mL      Sig: INJECT UNDER THE SKIN AS DIRECTED FOR MEALS AND CORRECTIONS, UP  UNITS PER DAY       Last Filled:      Patient Phone Number: [phone] (home)     Last appt: 8/21/2023  Next appt: 2/5/2024    Last Labs DM:   Lab Results   Component Value Date/Time    LABA1C 9.0 06/05/2023 09:43 AM

## 2024-01-11 NOTE — TELEPHONE ENCOUNTER
Pt called in stating that her son has pink eye and now she states that she has it and would like something sent in. However I did explain to Pt that the doctor might ask for an appointment.

## 2024-01-16 ENCOUNTER — OFFICE VISIT (OUTPATIENT)
Dept: ENT CLINIC | Age: 39
End: 2024-01-16
Payer: COMMERCIAL

## 2024-01-16 VITALS
TEMPERATURE: 97.1 F | BODY MASS INDEX: 47.45 KG/M2 | HEART RATE: 84 BPM | DIASTOLIC BLOOD PRESSURE: 75 MMHG | WEIGHT: 284.8 LBS | SYSTOLIC BLOOD PRESSURE: 128 MMHG | HEIGHT: 65 IN

## 2024-01-16 DIAGNOSIS — J35.3 HYPERTROPHY OF TONSILS AND ADENOIDS: ICD-10-CM

## 2024-01-16 DIAGNOSIS — G47.33 OBSTRUCTIVE SLEEP APNEA: Primary | ICD-10-CM

## 2024-01-16 PROCEDURE — 31575 DIAGNOSTIC LARYNGOSCOPY: CPT | Performed by: OTOLARYNGOLOGY

## 2024-01-16 PROCEDURE — 3074F SYST BP LT 130 MM HG: CPT | Performed by: OTOLARYNGOLOGY

## 2024-01-16 PROCEDURE — G8484 FLU IMMUNIZE NO ADMIN: HCPCS | Performed by: OTOLARYNGOLOGY

## 2024-01-16 PROCEDURE — G8417 CALC BMI ABV UP PARAM F/U: HCPCS | Performed by: OTOLARYNGOLOGY

## 2024-01-16 PROCEDURE — 1036F TOBACCO NON-USER: CPT | Performed by: OTOLARYNGOLOGY

## 2024-01-16 PROCEDURE — G8427 DOCREV CUR MEDS BY ELIG CLIN: HCPCS | Performed by: OTOLARYNGOLOGY

## 2024-01-16 PROCEDURE — 99204 OFFICE O/P NEW MOD 45 MIN: CPT | Performed by: OTOLARYNGOLOGY

## 2024-01-16 PROCEDURE — 3078F DIAST BP <80 MM HG: CPT | Performed by: OTOLARYNGOLOGY

## 2024-01-16 NOTE — PROGRESS NOTES
CHIEF COMPLAINT: Upper airway obstruction.    HISTORY OF PRESENT ILLNESS:  38 y.o. female who presents with a long history of snoring and upper airway obstruction.  She has been seen in the sleep laboratory and found to have obstructive sleep apnea.  It was recommended by the laboratory that she had tonsillectomy.  She has no strep tonsillitis.    PAST MEDICAL HISTORY:   Social History     Tobacco Use   Smoking Status Never   Smokeless Tobacco Never                                                    Social History     Substance and Sexual Activity   Alcohol Use No                                                    Current Outpatient Medications:     insulin lispro (HUMALOG KWIKPEN) 200 UNIT/ML SOPN pen, INJECT UNDER THE SKIN AS DIRECTED FOR MEALS AND CORRECTIONS, UP  UNITS PER DAY, Disp: 30 mL, Rfl: 0    predniSONE (DELTASONE) 10 MG tablet, TAKE 1 TABLET BY MOUTH DAILY, Disp: 35 tablet, Rfl: 1    Continuous Blood Gluc Sensor (DEXCOM G6 SENSOR) MISC, CHANGE EVERY 10 DAYS, Disp: 3 each, Rfl: 5    insulin lispro (HUMALOG) 100 UNIT/ML SOLN injection vial, Inject 35 units three times daily with meals and 15 units for a snack daily. Upto 120 units daily, Disp: 40 mL, Rfl: 1    LANTUS SOLOSTAR 100 UNIT/ML injection pen, ADMINISTER 100 UNITS UNDER THE SKIN EVERY NIGHT, Disp: 30 mL, Rfl: 5    Continuous Blood Gluc Transmit (DEXCOM G6 TRANSMITTER) MISC, Change every 3 months, Disp: 1 each, Rfl: 3    Insulin Syringe-Needle U-100 ([employer] INS SYR .3CC/29G) 29G X 1/2\" 0.3 ML MISC, 1 each by Does not apply route daily, Disp: 300 each, Rfl: 3    Continuous Blood Gluc  (DEXCOM G6 ) CRYSTAL, Use for personal CGM, Disp: 1 each, Rfl: 0    Lancets MISC, 1 each by Does not apply route 2 times daily, Disp: 100 each, Rfl: 5    trimethoprim-polymyxin b (POLYTRIM) 58045-5.1 UNIT/ML-% ophthalmic solution, Place 1 drop into the right eye every 4 hours for 10 days (Patient not taking: Reported on 1/16/2024), Disp: 3 mL, Rfl:

## 2024-01-31 DIAGNOSIS — E66.9 TYPE 1 DIABETES MELLITUS WITH OBESITY (HCC): ICD-10-CM

## 2024-01-31 DIAGNOSIS — R80.9 TYPE 1 DIABETES MELLITUS WITH MICROALBUMINURIA (HCC): ICD-10-CM

## 2024-01-31 DIAGNOSIS — E10.29 TYPE 1 DIABETES MELLITUS WITH MICROALBUMINURIA (HCC): ICD-10-CM

## 2024-01-31 DIAGNOSIS — E10.69 TYPE 1 DIABETES MELLITUS WITH OBESITY (HCC): ICD-10-CM

## 2024-02-01 RX ORDER — INSULIN LISPRO 200 [IU]/ML
INJECTION, SOLUTION SUBCUTANEOUS
Qty: 20 ML | Refills: 0 | Status: SHIPPED | OUTPATIENT
Start: 2024-02-01

## 2024-02-05 ENCOUNTER — OFFICE VISIT (OUTPATIENT)
Dept: ENDOCRINOLOGY | Age: 39
End: 2024-02-05
Payer: COMMERCIAL

## 2024-02-05 ENCOUNTER — TELEPHONE (OUTPATIENT)
Dept: PRIMARY CARE CLINIC | Age: 39
End: 2024-02-05

## 2024-02-05 VITALS
DIASTOLIC BLOOD PRESSURE: 78 MMHG | OXYGEN SATURATION: 97 % | HEIGHT: 65 IN | SYSTOLIC BLOOD PRESSURE: 144 MMHG | HEART RATE: 77 BPM | BODY MASS INDEX: 47.39 KG/M2

## 2024-02-05 DIAGNOSIS — E10.42 POORLY CONTROLLED TYPE 1 DIABETES MELLITUS WITH PERIPHERAL NEUROPATHY (HCC): ICD-10-CM

## 2024-02-05 DIAGNOSIS — E66.9 TYPE 1 DIABETES MELLITUS WITH OBESITY (HCC): ICD-10-CM

## 2024-02-05 DIAGNOSIS — E10.65 POORLY CONTROLLED TYPE 1 DIABETES MELLITUS WITH PERIPHERAL NEUROPATHY (HCC): ICD-10-CM

## 2024-02-05 DIAGNOSIS — E10.69 TYPE 1 DIABETES MELLITUS WITH OBESITY (HCC): ICD-10-CM

## 2024-02-05 DIAGNOSIS — R80.9 TYPE 1 DIABETES MELLITUS WITH MICROALBUMINURIA (HCC): Primary | ICD-10-CM

## 2024-02-05 DIAGNOSIS — E10.69 DYSLIPIDEMIA DUE TO TYPE 1 DIABETES MELLITUS (HCC): ICD-10-CM

## 2024-02-05 DIAGNOSIS — E10.29 TYPE 1 DIABETES MELLITUS WITH MICROALBUMINURIA (HCC): Primary | ICD-10-CM

## 2024-02-05 DIAGNOSIS — E78.5 DYSLIPIDEMIA DUE TO TYPE 1 DIABETES MELLITUS (HCC): ICD-10-CM

## 2024-02-05 PROCEDURE — G8427 DOCREV CUR MEDS BY ELIG CLIN: HCPCS | Performed by: INTERNAL MEDICINE

## 2024-02-05 PROCEDURE — 99214 OFFICE O/P EST MOD 30 MIN: CPT | Performed by: INTERNAL MEDICINE

## 2024-02-05 PROCEDURE — 2022F DILAT RTA XM EVC RTNOPTHY: CPT | Performed by: INTERNAL MEDICINE

## 2024-02-05 PROCEDURE — 3046F HEMOGLOBIN A1C LEVEL >9.0%: CPT | Performed by: INTERNAL MEDICINE

## 2024-02-05 PROCEDURE — G8417 CALC BMI ABV UP PARAM F/U: HCPCS | Performed by: INTERNAL MEDICINE

## 2024-02-05 PROCEDURE — 1036F TOBACCO NON-USER: CPT | Performed by: INTERNAL MEDICINE

## 2024-02-05 PROCEDURE — 95251 CONT GLUC MNTR ANALYSIS I&R: CPT | Performed by: INTERNAL MEDICINE

## 2024-02-05 PROCEDURE — G8484 FLU IMMUNIZE NO ADMIN: HCPCS | Performed by: INTERNAL MEDICINE

## 2024-02-05 PROCEDURE — 3077F SYST BP >= 140 MM HG: CPT | Performed by: INTERNAL MEDICINE

## 2024-02-05 PROCEDURE — 3078F DIAST BP <80 MM HG: CPT | Performed by: INTERNAL MEDICINE

## 2024-02-05 RX ORDER — PROCHLORPERAZINE 25 MG/1
SUPPOSITORY RECTAL
Qty: 1 EACH | Refills: 3 | Status: SHIPPED | OUTPATIENT
Start: 2024-02-05

## 2024-02-05 RX ORDER — INSULIN LISPRO 200 [IU]/ML
INJECTION, SOLUTION SUBCUTANEOUS
Qty: 15 ADJUSTABLE DOSE PRE-FILLED PEN SYRINGE | Refills: 2 | Status: SHIPPED | OUTPATIENT
Start: 2024-02-05

## 2024-02-05 NOTE — PROGRESS NOTES
Occupational History    Not on file   Tobacco Use    Smoking status: Never    Smokeless tobacco: Never   Vaping Use    Vaping Use: Never used   Substance and Sexual Activity    Alcohol use: No    Drug use: No    Sexual activity: Yes   Other Topics Concern    Not on file   Social History Narrative    Not on file     Social Determinants of Health     Financial Resource Strain: Low Risk  (2023)    Overall Financial Resource Strain (CARDIA)     Difficulty of Paying Living Expenses: Not hard at all   Food Insecurity: Not on file (2023)   Transportation Needs: Unknown (2023)    PRAPARE - Transportation     Lack of Transportation (Medical): Not on file     Lack of Transportation (Non-Medical): No   Physical Activity: Not on file   Stress: Not on file   Social Connections: Not on file   Intimate Partner Violence: Not on file   Housing Stability: Unknown (2023)    Housing Stability Vital Sign     Unable to Pay for Housing in the Last Year: Not on file     Number of Places Lived in the Last Year: Not on file     Unstable Housing in the Last Year: No       Past Medical History:   Diagnosis Date    Arthritis     Diabetes mellitus (HCC)     Hypertension     Sleep apnea        Past Surgical History:   Procedure Laterality Date     SECTION      HAND SURGERY Bilateral        No Known Allergies      Current Outpatient Medications:     insulin lispro (HUMALOG KWIKPEN) 200 UNIT/ML SOPN pen, Up to 300 units per day, Disp: 15 Adjustable Dose Pre-filled Pen Syringe, Rfl: 2    Continuous Blood Gluc Transmit (DEXCOM G6 TRANSMITTER) MISC, Change every 3 months, Disp: 1 each, Rfl: 3    acyclovir (ZOVIRAX) 200 MG capsule, TAKE 1 CAPSULE BY MOUTH EVERY 4 HOURS WHILE AWAKE FOR 10 DAYS, Disp: 60 capsule, Rfl: 0    predniSONE (DELTASONE) 10 MG tablet, TAKE 1 TABLET BY MOUTH DAILY, Disp: 35 tablet, Rfl: 1    LANTUS SOLOSTAR 100 UNIT/ML injection pen, ADMINISTER 100 UNITS UNDER THE SKIN EVERY NIGHT, Disp: 30 mL, Rfl:

## 2024-02-05 NOTE — TELEPHONE ENCOUNTER
Spoke with patient who states she dropped off BMV forms for Physician to sign.  I called [facility], printed the form and handed it to Abhilash to have Dr. Méndez complete.  Called patient and left message letting her know the form would be ready for her to  tomorrow.

## 2024-02-12 ENCOUNTER — OFFICE VISIT (OUTPATIENT)
Dept: PRIMARY CARE CLINIC | Age: 39
End: 2024-02-12
Payer: COMMERCIAL

## 2024-02-12 VITALS
HEIGHT: 65 IN | BODY MASS INDEX: 47.95 KG/M2 | SYSTOLIC BLOOD PRESSURE: 130 MMHG | WEIGHT: 287.8 LBS | TEMPERATURE: 98.7 F | HEART RATE: 76 BPM | DIASTOLIC BLOOD PRESSURE: 84 MMHG

## 2024-02-12 DIAGNOSIS — I15.2 HYPERTENSION ASSOCIATED WITH DIABETES (HCC): ICD-10-CM

## 2024-02-12 DIAGNOSIS — E66.9 TYPE 1 DIABETES MELLITUS WITH OBESITY (HCC): ICD-10-CM

## 2024-02-12 DIAGNOSIS — E11.59 HYPERTENSION ASSOCIATED WITH DIABETES (HCC): ICD-10-CM

## 2024-02-12 DIAGNOSIS — E10.29 TYPE 1 DIABETES MELLITUS WITH MICROALBUMINURIA (HCC): Primary | ICD-10-CM

## 2024-02-12 DIAGNOSIS — E10.69 TYPE 1 DIABETES MELLITUS WITH OBESITY (HCC): ICD-10-CM

## 2024-02-12 DIAGNOSIS — R80.9 TYPE 1 DIABETES MELLITUS WITH MICROALBUMINURIA (HCC): Primary | ICD-10-CM

## 2024-02-12 DIAGNOSIS — E78.5 DYSLIPIDEMIA DUE TO TYPE 1 DIABETES MELLITUS (HCC): ICD-10-CM

## 2024-02-12 DIAGNOSIS — E10.69 DYSLIPIDEMIA DUE TO TYPE 1 DIABETES MELLITUS (HCC): ICD-10-CM

## 2024-02-12 DIAGNOSIS — M05.79 RHEUMATOID ARTHRITIS INVOLVING MULTIPLE SITES WITH POSITIVE RHEUMATOID FACTOR (HCC): ICD-10-CM

## 2024-02-12 LAB — HBA1C MFR BLD: 8.3 %

## 2024-02-12 PROCEDURE — 83036 HEMOGLOBIN GLYCOSYLATED A1C: CPT | Performed by: STUDENT IN AN ORGANIZED HEALTH CARE EDUCATION/TRAINING PROGRAM

## 2024-02-12 PROCEDURE — 3052F HG A1C>EQUAL 8.0%<EQUAL 9.0%: CPT | Performed by: STUDENT IN AN ORGANIZED HEALTH CARE EDUCATION/TRAINING PROGRAM

## 2024-02-12 PROCEDURE — G8427 DOCREV CUR MEDS BY ELIG CLIN: HCPCS | Performed by: STUDENT IN AN ORGANIZED HEALTH CARE EDUCATION/TRAINING PROGRAM

## 2024-02-12 PROCEDURE — 3075F SYST BP GE 130 - 139MM HG: CPT | Performed by: STUDENT IN AN ORGANIZED HEALTH CARE EDUCATION/TRAINING PROGRAM

## 2024-02-12 PROCEDURE — 99214 OFFICE O/P EST MOD 30 MIN: CPT | Performed by: STUDENT IN AN ORGANIZED HEALTH CARE EDUCATION/TRAINING PROGRAM

## 2024-02-12 PROCEDURE — 3079F DIAST BP 80-89 MM HG: CPT | Performed by: STUDENT IN AN ORGANIZED HEALTH CARE EDUCATION/TRAINING PROGRAM

## 2024-02-12 PROCEDURE — 1036F TOBACCO NON-USER: CPT | Performed by: STUDENT IN AN ORGANIZED HEALTH CARE EDUCATION/TRAINING PROGRAM

## 2024-02-12 PROCEDURE — G8484 FLU IMMUNIZE NO ADMIN: HCPCS | Performed by: STUDENT IN AN ORGANIZED HEALTH CARE EDUCATION/TRAINING PROGRAM

## 2024-02-12 PROCEDURE — 2022F DILAT RTA XM EVC RTNOPTHY: CPT | Performed by: STUDENT IN AN ORGANIZED HEALTH CARE EDUCATION/TRAINING PROGRAM

## 2024-02-12 PROCEDURE — G8417 CALC BMI ABV UP PARAM F/U: HCPCS | Performed by: STUDENT IN AN ORGANIZED HEALTH CARE EDUCATION/TRAINING PROGRAM

## 2024-02-12 NOTE — PROGRESS NOTES
Gastrointestinal:  Negative for abdominal distention, abdominal pain, diarrhea and nausea.   Endocrine: Negative for polydipsia, polyphagia and polyuria.   Genitourinary:  Negative for decreased urine volume, dysuria and frequency.   Musculoskeletal:  Negative for gait problem and myalgias.   Skin:  Negative for rash and wound.   Neurological:  Negative for dizziness, weakness, light-headedness and numbness.   Hematological:  Does not bruise/bleed easily.       Prior to Visit Medications    Medication Sig Taking? Authorizing Provider   insulin lispro (HUMALOG KWIKPEN) 200 UNIT/ML SOPN pen Up to 300 units per day Yes Ashlee Hunter MD   Continuous Blood Gluc Transmit (DEXCOM G6 TRANSMITTER) MISC Change every 3 months Yes Ashlee Hunter MD   acyclovir (ZOVIRAX) 200 MG capsule TAKE 1 CAPSULE BY MOUTH EVERY 4 HOURS WHILE AWAKE FOR 10 DAYS Yes Ronnell Méndez DO   predniSONE (DELTASONE) 10 MG tablet TAKE 1 TABLET BY MOUTH DAILY Yes Ronnell Méndez DO   Insulin Syringe-Needle U-100 ([employer] INS SYR .3CC/29G) 29G X 1/2\" 0.3 ML MISC 1 each by Does not apply route daily Yes Ronnell Méndez DO   naproxen (NAPROSYN) 500 MG tablet Take 1 tablet by mouth 2 times daily for 20 doses  Unruly Hoang MD   Continuous Blood Gluc Sensor (DEXCOM G6 SENSOR) MISC CHANGE EVERY 10 DAYS  Patient not taking: Reported on 2/5/2024  Ronnell Méndez DO   LANTUS SOLOSTAR 100 UNIT/ML injection pen ADMINISTER 100 UNITS UNDER THE SKIN EVERY NIGHT  Patient not taking: Reported on 2/12/2024  Ronnell Méndez DO   Continuous Blood Gluc  (DEXCOM G6 ) CRYSTAL Use for personal CGM  Patient not taking: Reported on 2/5/2024  Ashlee Hunter MD   Lancets MISC 1 each by Does not apply route 2 times daily  Patient not taking: Reported on 2/12/2024  Ronnell Méndez DO        Social History     Tobacco Use    Smoking status: Never    Smokeless tobacco: Never   Substance Use Topics    Alcohol use: No        Vitals:    02/12/24

## 2024-02-26 RX ORDER — ACYCLOVIR 200 MG/1
CAPSULE ORAL
Qty: 60 CAPSULE | Refills: 0 | Status: SHIPPED | OUTPATIENT
Start: 2024-02-26

## 2024-02-26 NOTE — TELEPHONE ENCOUNTER
Medication:   Requested Prescriptions     Pending Prescriptions Disp Refills    acyclovir (ZOVIRAX) 200 MG capsule [Pharmacy Med Name: ACYCLOVIR 200MG CAPSULES] 60 capsule 0     Sig: TAKE 1 CAPSULE BY MOUTH EVERY 4 HOURS WHILE AWAKE FOR 10 DAYS        Last Filled:  01/10/24    Patient Phone Number: [phone] (home)     Last appt: 2/12/2024   Next appt: Visit date not found    Last OARRS:        No data to display

## 2024-02-27 ENCOUNTER — TELEPHONE (OUTPATIENT)
Dept: ENDOCRINOLOGY | Age: 39
End: 2024-02-27

## 2024-02-27 NOTE — TELEPHONE ENCOUNTER
Call from pt stating that she a PA for Humalog 200 unit     Pt stated that she is completely out of her insulin     Pt is wanting to know if the PA could be sent urgent     Please advise

## 2024-02-28 NOTE — TELEPHONE ENCOUNTER
Mrs. Aguilar stated she has picked up insulin from [facility].  Mrs. Aguilar is aware PA is in process.

## 2024-02-28 NOTE — TELEPHONE ENCOUNTER
Submitted PA for Humalog  [facility] Key: [id number] STATUS: PENDING.    Marked Urgent    Follow up done daily; if no decision with in three days we will refax.  If another three days goes by with no decision will call the insurance for status.

## 2024-02-28 NOTE — TELEPHONE ENCOUNTER
Call from pt stating that she is completely out her insulin    Pt is wanting to know if there is an alternative that Dr. Hunter could send in to her pharmacy?    Pt stated that she is using a pump     Please advise   CB# [phone]

## 2024-03-01 DIAGNOSIS — M05.79 RHEUMATOID ARTHRITIS INVOLVING MULTIPLE SITES WITH POSITIVE RHEUMATOID FACTOR (HCC): ICD-10-CM

## 2024-03-04 RX ORDER — PREDNISONE 10 MG/1
10 TABLET ORAL DAILY
Qty: 35 TABLET | Refills: 1 | Status: SHIPPED | OUTPATIENT
Start: 2024-03-04

## 2024-03-04 NOTE — TELEPHONE ENCOUNTER
Medication:   Requested Prescriptions     Pending Prescriptions Disp Refills    predniSONE (DELTASONE) 10 MG tablet [Pharmacy Med Name: PREDNISONE 10MG** TABLETS] 35 tablet 1     Sig: TAKE 1 TABLET BY MOUTH DAILY        Last Filled:  12/27/23    Patient Phone Number: [phone] (home)     Last appt: 2/12/2024 Return in about 3 months (around 5/12/2024) for Diabetes, Blood Pressure, High Cholesterol  Next appt: Visit date not found    Last OARRS:        No data to display

## 2024-04-04 DIAGNOSIS — R80.9 TYPE 1 DIABETES MELLITUS WITH MICROALBUMINURIA (HCC): ICD-10-CM

## 2024-04-04 DIAGNOSIS — E66.9 TYPE 1 DIABETES MELLITUS WITH OBESITY (HCC): ICD-10-CM

## 2024-04-04 DIAGNOSIS — E10.69 TYPE 1 DIABETES MELLITUS WITH OBESITY (HCC): ICD-10-CM

## 2024-04-04 DIAGNOSIS — E10.29 TYPE 1 DIABETES MELLITUS WITH MICROALBUMINURIA (HCC): ICD-10-CM

## 2024-04-04 RX ORDER — INSULIN LISPRO 200 [IU]/ML
INJECTION, SOLUTION SUBCUTANEOUS
Qty: 15 ML | Refills: 2 | Status: SHIPPED | OUTPATIENT
Start: 2024-04-04

## 2024-04-16 RX ORDER — ACYCLOVIR 200 MG/1
CAPSULE ORAL
Qty: 60 CAPSULE | Refills: 0 | Status: SHIPPED | OUTPATIENT
Start: 2024-04-16

## 2024-04-16 NOTE — TELEPHONE ENCOUNTER
Medication:   Requested Prescriptions     Pending Prescriptions Disp Refills    acyclovir (ZOVIRAX) 200 MG capsule [Pharmacy Med Name: ACYCLOVIR 200MG CAPSULES] 60 capsule 0     Sig: TAKE 1 CAPSULE BY MOUTH EVERY 4 HOURS WHILE AWAKE FOR 10 DAYS        Last Filled:  02/26/24    Patient Phone Number: [phone] (home)     Last appt: 2/12/2024 Return in about 3 months (around 5/12/2024) for Diabetes, Blood Pressure, High Cholesterol.   Next appt: Visit date not found    Last OARRS:        No data to display

## 2024-04-21 DIAGNOSIS — E10.29 TYPE 1 DIABETES MELLITUS WITH MICROALBUMINURIA (HCC): ICD-10-CM

## 2024-04-21 DIAGNOSIS — R80.9 TYPE 1 DIABETES MELLITUS WITH MICROALBUMINURIA (HCC): ICD-10-CM

## 2024-04-22 RX ORDER — PROCHLORPERAZINE 25 MG/1
SUPPOSITORY RECTAL
Qty: 3 EACH | Refills: 5 | Status: SHIPPED | OUTPATIENT
Start: 2024-04-22

## 2024-04-22 NOTE — TELEPHONE ENCOUNTER
Medication:   Requested Prescriptions     Pending Prescriptions Disp Refills    Continuous Blood Gluc Sensor (DEXCOM G6 SENSOR) MISC [Pharmacy Med Name: DEXCOM G6 SENSOR (3 PACK)] 3 each 5     Sig: CHANGE EVERY 10 DAYS        Last Filled:    10/30/23  Patient Phone Number: [phone] (home)     Last appt: 2/12/2024   Next appt: Visit date not found    Last OARRS:        No data to display

## 2024-05-08 DIAGNOSIS — E10.69 TYPE 1 DIABETES MELLITUS WITH OBESITY (HCC): ICD-10-CM

## 2024-05-08 DIAGNOSIS — E10.29 TYPE 1 DIABETES MELLITUS WITH MICROALBUMINURIA (HCC): ICD-10-CM

## 2024-05-08 DIAGNOSIS — R80.9 TYPE 1 DIABETES MELLITUS WITH MICROALBUMINURIA (HCC): ICD-10-CM

## 2024-05-08 DIAGNOSIS — E66.9 TYPE 1 DIABETES MELLITUS WITH OBESITY (HCC): ICD-10-CM

## 2024-05-09 RX ORDER — INSULIN LISPRO 200 [IU]/ML
INJECTION, SOLUTION SUBCUTANEOUS
Qty: 15 ML | Refills: 2 | Status: SHIPPED | OUTPATIENT
Start: 2024-05-09

## 2024-05-13 ENCOUNTER — OFFICE VISIT (OUTPATIENT)
Dept: ENDOCRINOLOGY | Age: 39
End: 2024-05-13
Payer: COMMERCIAL

## 2024-05-13 VITALS
BODY MASS INDEX: 48.82 KG/M2 | HEART RATE: 92 BPM | SYSTOLIC BLOOD PRESSURE: 138 MMHG | WEIGHT: 293 LBS | OXYGEN SATURATION: 97 % | HEIGHT: 65 IN | DIASTOLIC BLOOD PRESSURE: 80 MMHG

## 2024-05-13 DIAGNOSIS — R80.9 TYPE 1 DIABETES MELLITUS WITH MICROALBUMINURIA (HCC): ICD-10-CM

## 2024-05-13 DIAGNOSIS — E10.69 DYSLIPIDEMIA DUE TO TYPE 1 DIABETES MELLITUS (HCC): Primary | ICD-10-CM

## 2024-05-13 DIAGNOSIS — E10.69 TYPE 1 DIABETES MELLITUS WITH OBESITY (HCC): ICD-10-CM

## 2024-05-13 DIAGNOSIS — E10.42 POORLY CONTROLLED TYPE 1 DIABETES MELLITUS WITH PERIPHERAL NEUROPATHY (HCC): ICD-10-CM

## 2024-05-13 DIAGNOSIS — E78.5 DYSLIPIDEMIA DUE TO TYPE 1 DIABETES MELLITUS (HCC): Primary | ICD-10-CM

## 2024-05-13 DIAGNOSIS — E66.9 TYPE 1 DIABETES MELLITUS WITH OBESITY (HCC): ICD-10-CM

## 2024-05-13 DIAGNOSIS — E10.29 TYPE 1 DIABETES MELLITUS WITH MICROALBUMINURIA (HCC): ICD-10-CM

## 2024-05-13 DIAGNOSIS — E10.65 POORLY CONTROLLED TYPE 1 DIABETES MELLITUS WITH PERIPHERAL NEUROPATHY (HCC): ICD-10-CM

## 2024-05-13 PROCEDURE — 3052F HG A1C>EQUAL 8.0%<EQUAL 9.0%: CPT | Performed by: INTERNAL MEDICINE

## 2024-05-13 PROCEDURE — 95251 CONT GLUC MNTR ANALYSIS I&R: CPT | Performed by: INTERNAL MEDICINE

## 2024-05-13 PROCEDURE — 99214 OFFICE O/P EST MOD 30 MIN: CPT | Performed by: INTERNAL MEDICINE

## 2024-05-13 PROCEDURE — G8417 CALC BMI ABV UP PARAM F/U: HCPCS | Performed by: INTERNAL MEDICINE

## 2024-05-13 PROCEDURE — 2022F DILAT RTA XM EVC RTNOPTHY: CPT | Performed by: INTERNAL MEDICINE

## 2024-05-13 PROCEDURE — 1036F TOBACCO NON-USER: CPT | Performed by: INTERNAL MEDICINE

## 2024-05-13 PROCEDURE — 3079F DIAST BP 80-89 MM HG: CPT | Performed by: INTERNAL MEDICINE

## 2024-05-13 PROCEDURE — G8427 DOCREV CUR MEDS BY ELIG CLIN: HCPCS | Performed by: INTERNAL MEDICINE

## 2024-05-13 PROCEDURE — 3075F SYST BP GE 130 - 139MM HG: CPT | Performed by: INTERNAL MEDICINE

## 2024-05-13 RX ORDER — INSULIN GLARGINE 100 [IU]/ML
INJECTION, SOLUTION SUBCUTANEOUS
Qty: 30 ML | Refills: 5 | Status: SHIPPED | OUTPATIENT
Start: 2024-05-13

## 2024-05-13 NOTE — PROGRESS NOTES
Patient ID:   Marisa Aguilar is a 38 y.o. female    Chief Complaint:   Marisa Aguilar presents for an evaluation of Type 1 Diabetes Mellitus , Hyperlipidemia and hypertension.         Subjective:   Type 1 Diabetes Mellitus diagnosed at age 11   Previously seen Dr. Castellanos     She is on prednisone 10 mg daily for RA. She will be on it until she sees Rheumatologist.     OV May 2022 . No showed June 2022, Aug 2022   Her diabetes was in better control one year ago     Former regimen:   Lantus 100 units a bedtime (instead of am) . Denies missing doses.    Lispro 35 units for each meals. Denies missing doses.    Correction: 3:50 above 150     Works from 8am to 5pm (Monday through Friday), and then second job 10 pm to 6 am (Thursday to Sunday)   Raising 3 kids     Prednisone 10 mg daily for RA     Currently on     Since fall 2023: Tandem settings - using U200    Basal rate (units per hour)   MN: 1.20     Correction:  MN - 30    Carb ratio:   MN - 8     Target 110     Active insulin time : 5 hours       Using DEXCOM , unable to download today     Checks blood sugars 3-4 times per day.  Reportedly around 200's  AM:   Lunch:  Supper:   HS:     Hypoglycemias: unawareness present     Meals: Three or four, dinner is bigger. No snacks. Drinks crystal light, diet soda.   Exercise: Treadmill 35 minutes once a week.      Denies chest pain, exertional dyspnea.     Family history of CAD: None   Denies smoking/alcohol    The following portions of the patient's history were reviewed and updated as appropriate:       Family History   Problem Relation Age of Onset    No Known Problems Mother     Pulmonary Embolism Father     No Known Problems Sister     No Known Problems Brother     Heart Failure Maternal Grandmother            Social History     Socioeconomic History    Marital status: Single     Spouse name: Not on file    Number of children: Not on file    Years of education: Not on file    Highest education level: Not on file

## 2024-05-24 DIAGNOSIS — M05.79 RHEUMATOID ARTHRITIS INVOLVING MULTIPLE SITES WITH POSITIVE RHEUMATOID FACTOR (HCC): ICD-10-CM

## 2024-05-28 DIAGNOSIS — R80.9 TYPE 1 DIABETES MELLITUS WITH MICROALBUMINURIA (HCC): ICD-10-CM

## 2024-05-28 DIAGNOSIS — E11.59 HYPERTENSION ASSOCIATED WITH DIABETES (HCC): ICD-10-CM

## 2024-05-28 DIAGNOSIS — E10.29 TYPE 1 DIABETES MELLITUS WITH MICROALBUMINURIA (HCC): ICD-10-CM

## 2024-05-28 DIAGNOSIS — I15.2 HYPERTENSION ASSOCIATED WITH DIABETES (HCC): ICD-10-CM

## 2024-05-28 RX ORDER — LISINOPRIL 10 MG/1
10 TABLET ORAL DAILY
Qty: 90 TABLET | Refills: 1 | OUTPATIENT
Start: 2024-05-28

## 2024-05-28 RX ORDER — PREDNISONE 10 MG/1
10 TABLET ORAL DAILY
Qty: 35 TABLET | Refills: 1 | OUTPATIENT
Start: 2024-05-28

## 2024-05-28 NOTE — TELEPHONE ENCOUNTER
Medication:   Requested Prescriptions     Pending Prescriptions Disp Refills    predniSONE (DELTASONE) 10 MG tablet [Pharmacy Med Name: PREDNISONE 10MG** TABLETS] 35 tablet 1     Sig: TAKE 1 TABLET BY MOUTH DAILY        Last Filled:  034/24    Patient Phone Number: [phone] (home)     Last appt: 2/12/2024  Return in about 3 months (around 5/12/2024) for Diabetes, Blood Pressure, High Cholesterol.  Next appt: Visit date not found    Last OARRS:        No data to display

## 2024-05-30 ENCOUNTER — TELEPHONE (OUTPATIENT)
Dept: PRIMARY CARE CLINIC | Age: 39
End: 2024-05-30

## 2024-05-30 DIAGNOSIS — M05.79 RHEUMATOID ARTHRITIS INVOLVING MULTIPLE SITES WITH POSITIVE RHEUMATOID FACTOR (HCC): ICD-10-CM

## 2024-05-30 RX ORDER — PREDNISONE 10 MG/1
10 TABLET ORAL DAILY
Qty: 35 TABLET | Refills: 1 | Status: SHIPPED | OUTPATIENT
Start: 2024-05-30

## 2024-05-30 NOTE — TELEPHONE ENCOUNTER
Medication:   Requested Prescriptions     Pending Prescriptions Disp Refills    predniSONE (DELTASONE) 10 MG tablet 35 tablet 1     Sig: Take 1 tablet by mouth daily        Last Filled:  3/4.24    Patient Phone Number: [phone] (home)     Last appt: 2/12/2024   Next appt: Visit date not found    Last OARRS:        No data to display

## 2024-05-30 NOTE — TELEPHONE ENCOUNTER
Pt called in requesting refill      predniSONE (DELTASONE) 10 MG tablet [[id number]]    Order Details  Dose: 10 mg Route: Oral Frequency: DAILY   Dispense Quantity: 35 tablet Refills: 1          Sig: TAKE 1 TABLET BY MOUTH DAILY         Start Date: 03/04/24 End Date: --   Written Date: 03/04/24 Expiration Date: 03/04/25       Associated Diagnoses: Rheumatoid arthritis involving multiple sites with positive rheumatoid factor (HCC) [M05.79]   Original Order: predniSONE (DELTASONE) 10 MG tablet [[id number]]   Providers    Authorizing Provider: Ronnell Méndez DO NPI: [id number]   Ordering User: Ronnell Méndez DO          Pharmacy    [id number] [place], [place] - [address] - P [phone] - F [phone]  [facility] [address] [place] [address]  Phone: [phone]  Fax: [phone]

## 2024-06-04 ENCOUNTER — OFFICE VISIT (OUTPATIENT)
Dept: PRIMARY CARE CLINIC | Age: 39
End: 2024-06-04
Payer: COMMERCIAL

## 2024-06-04 VITALS
TEMPERATURE: 97.3 F | HEIGHT: 63 IN | DIASTOLIC BLOOD PRESSURE: 75 MMHG | HEART RATE: 94 BPM | BODY MASS INDEX: 51.91 KG/M2 | WEIGHT: 293 LBS | SYSTOLIC BLOOD PRESSURE: 134 MMHG

## 2024-06-04 DIAGNOSIS — J20.9 ACUTE BRONCHITIS, UNSPECIFIED ORGANISM: Primary | ICD-10-CM

## 2024-06-04 PROCEDURE — 99213 OFFICE O/P EST LOW 20 MIN: CPT | Performed by: STUDENT IN AN ORGANIZED HEALTH CARE EDUCATION/TRAINING PROGRAM

## 2024-06-04 PROCEDURE — 3078F DIAST BP <80 MM HG: CPT | Performed by: STUDENT IN AN ORGANIZED HEALTH CARE EDUCATION/TRAINING PROGRAM

## 2024-06-04 PROCEDURE — G8427 DOCREV CUR MEDS BY ELIG CLIN: HCPCS | Performed by: STUDENT IN AN ORGANIZED HEALTH CARE EDUCATION/TRAINING PROGRAM

## 2024-06-04 PROCEDURE — 3075F SYST BP GE 130 - 139MM HG: CPT | Performed by: STUDENT IN AN ORGANIZED HEALTH CARE EDUCATION/TRAINING PROGRAM

## 2024-06-04 PROCEDURE — G8417 CALC BMI ABV UP PARAM F/U: HCPCS | Performed by: STUDENT IN AN ORGANIZED HEALTH CARE EDUCATION/TRAINING PROGRAM

## 2024-06-04 PROCEDURE — 1036F TOBACCO NON-USER: CPT | Performed by: STUDENT IN AN ORGANIZED HEALTH CARE EDUCATION/TRAINING PROGRAM

## 2024-06-04 RX ORDER — AMOXICILLIN 500 MG/1
500 CAPSULE ORAL 2 TIMES DAILY
Qty: 14 CAPSULE | Refills: 0 | Status: SHIPPED | OUTPATIENT
Start: 2024-06-04 | End: 2024-06-11

## 2024-06-04 RX ORDER — PROCHLORPERAZINE 25 MG/1
SUPPOSITORY RECTAL
COMMUNITY
Start: 2024-05-24

## 2024-06-04 RX ORDER — PREDNISONE 20 MG/1
20 TABLET ORAL DAILY
Qty: 5 TABLET | Refills: 0 | Status: SHIPPED | OUTPATIENT
Start: 2024-06-04 | End: 2024-06-09

## 2024-06-04 SDOH — ECONOMIC STABILITY: FOOD INSECURITY: WITHIN THE PAST 12 MONTHS, THE FOOD YOU BOUGHT JUST DIDN'T LAST AND YOU DIDN'T HAVE MONEY TO GET MORE.: SOMETIMES TRUE

## 2024-06-04 SDOH — ECONOMIC STABILITY: FOOD INSECURITY: WITHIN THE PAST 12 MONTHS, YOU WORRIED THAT YOUR FOOD WOULD RUN OUT BEFORE YOU GOT MONEY TO BUY MORE.: SOMETIMES TRUE

## 2024-06-04 SDOH — ECONOMIC STABILITY: INCOME INSECURITY: HOW HARD IS IT FOR YOU TO PAY FOR THE VERY BASICS LIKE FOOD, HOUSING, MEDICAL CARE, AND HEATING?: HARD

## 2024-06-04 ASSESSMENT — ENCOUNTER SYMPTOMS
RHINORRHEA: 0
SINUS PAIN: 0
SORE THROAT: 1
COUGH: 1
CHEST TIGHTNESS: 1
SINUS PRESSURE: 0
SHORTNESS OF BREATH: 1
WHEEZING: 0

## 2024-06-04 NOTE — PROGRESS NOTES
2024     Marisa Aguilar (:  1985) is a 38 y.o. female, here for evaluation of the following medical concerns:    HPI  Acute Bronchitis  Patient presents for evaluation of productive cough, sore throat, and wheezing. Symptoms began 1 week ago and are gradually worsening since that time. Past history is significant for occasional episodes of bronchitis.      Review of Systems   Constitutional:  Negative for activity change, chills and fever.   HENT:  Positive for sore throat. Negative for congestion, ear pain, rhinorrhea, sinus pressure and sinus pain.    Respiratory:  Positive for cough, chest tightness and shortness of breath. Negative for wheezing.    Cardiovascular:  Negative for chest pain.   Neurological:  Negative for dizziness.       Prior to Visit Medications    Medication Sig Taking? Authorizing Provider   Continuous Glucose Sensor (DEXCOM G6 SENSOR) MISC  Yes ProviderCecilia MD   predniSONE (DELTASONE) 20 MG tablet Take 1 tablet by mouth daily for 5 days Yes Ronnell Méndez DO   amoxicillin (AMOXIL) 500 MG capsule Take 1 capsule by mouth 2 times daily for 7 days Yes Ronnell Méndez DO   predniSONE (DELTASONE) 10 MG tablet Take 1 tablet by mouth daily Yes Ronnell Méndez DO   insulin glargine (LANTUS SOLOSTAR) 100 UNIT/ML injection pen ADMINISTER 100 UNITS UNDER THE SKIN EVERY NIGHT Yes Ashlee Hunter MD   HUMALOG KWIKPEN 200 UNIT/ML SOPN pen ADMINISTER UP  UNITS UNDER THE SKIN EVERY DAY Yes Ashlee Hunter MD   acyclovir (ZOVIRAX) 200 MG capsule TAKE 1 CAPSULE BY MOUTH EVERY 4 HOURS WHILE AWAKE FOR 10 DAYS Yes Ronnell Méndez DO   Insulin Syringe-Needle U-100 ([employer] INS SYR .3CC/29G) 29G X 1/2\" 0.3 ML MISC 1 each by Does not apply route daily Yes Ronnell Méndez DO   Lancets MISC 1 each by Does not apply route 2 times daily Yes Ronnell Méndez DO        Social History     Tobacco Use    Smoking status: Never    Smokeless tobacco: Never   Substance Use Topics    Alcohol use:

## 2024-06-12 RX ORDER — ACYCLOVIR 200 MG/1
CAPSULE ORAL
Qty: 60 CAPSULE | Refills: 0 | Status: SHIPPED | OUTPATIENT
Start: 2024-06-12

## 2024-06-18 DIAGNOSIS — R80.9 TYPE 1 DIABETES MELLITUS WITH MICROALBUMINURIA (HCC): ICD-10-CM

## 2024-06-18 DIAGNOSIS — E66.9 TYPE 1 DIABETES MELLITUS WITH OBESITY (HCC): ICD-10-CM

## 2024-06-18 DIAGNOSIS — E10.69 TYPE 1 DIABETES MELLITUS WITH OBESITY (HCC): ICD-10-CM

## 2024-06-18 DIAGNOSIS — E10.29 TYPE 1 DIABETES MELLITUS WITH MICROALBUMINURIA (HCC): ICD-10-CM

## 2024-06-19 RX ORDER — INSULIN LISPRO 200 [IU]/ML
INJECTION, SOLUTION SUBCUTANEOUS
Qty: 15 ML | Refills: 0 | Status: SHIPPED | OUTPATIENT
Start: 2024-06-19

## 2024-06-19 NOTE — TELEPHONE ENCOUNTER
Medication:   Requested Prescriptions     Pending Prescriptions Disp Refills    HUMALOG KWIKPEN 200 UNIT/ML SOPN pen [Pharmacy Med Name: HUMALOG 200 U/ML KWIKPEN INJ 3ML] 15 mL 0     Sig: ADMINISTER UP  UNITS UNDER THE SKIN EVERY DAY       Last Filled:  5/9/24    Patient Phone Number: [phone] (home)     Last appt: 5/13/2024   Next appt: 8/19/2024    Last Labs DM:   Lab Results   Component Value Date/Time    LABA1C 8.3 02/12/2024 09:17 AM

## 2024-06-20 ENCOUNTER — TELEPHONE (OUTPATIENT)
Dept: ENDOCRINOLOGY | Age: 39
End: 2024-06-20

## 2024-06-20 NOTE — TELEPHONE ENCOUNTER
Submitted PA for Humalog  Via CM Key: [id number]    STATUS: Electronic prior authorization not supported as a duplicate PA was found.     If this requires a response please respond to the pool ( P MHCX PSC MEDICATION PRE-AUTH).      Thank you please advise patient.

## 2024-07-02 DIAGNOSIS — R80.9 TYPE 1 DIABETES MELLITUS WITH MICROALBUMINURIA (HCC): ICD-10-CM

## 2024-07-02 DIAGNOSIS — E66.9 TYPE 1 DIABETES MELLITUS WITH OBESITY (HCC): ICD-10-CM

## 2024-07-02 DIAGNOSIS — E10.29 TYPE 1 DIABETES MELLITUS WITH MICROALBUMINURIA (HCC): ICD-10-CM

## 2024-07-02 DIAGNOSIS — E10.69 TYPE 1 DIABETES MELLITUS WITH OBESITY (HCC): ICD-10-CM

## 2024-07-02 RX ORDER — INSULIN LISPRO 200 [IU]/ML
INJECTION, SOLUTION SUBCUTANEOUS
Qty: 15 ML | Refills: 2 | Status: SHIPPED | OUTPATIENT
Start: 2024-07-02

## 2024-07-02 NOTE — TELEPHONE ENCOUNTER
Requested Prescriptions     Pending Prescriptions Disp Refills    HUMALOG KWIKPEN 200 UNIT/ML SOPN pen [Pharmacy Med Name: HUMALOG 200 U/ML KWIKPEN INJ 3ML] 15 mL 0     Sig: ADMINISTER UP  UNITS UNDER THE SKIN EVERY DAY     Last refilled:6/19/2024 # 15 ml no refills   Last seen: 5/13/2024  Follow up: 8/19/2024

## 2024-08-01 ENCOUNTER — TELEPHONE (OUTPATIENT)
Dept: ENDOCRINOLOGY | Age: 39
End: 2024-08-01

## 2024-08-01 DIAGNOSIS — E10.69 TYPE 1 DIABETES MELLITUS WITH OBESITY (HCC): ICD-10-CM

## 2024-08-01 DIAGNOSIS — R80.9 TYPE 1 DIABETES MELLITUS WITH MICROALBUMINURIA (HCC): ICD-10-CM

## 2024-08-01 DIAGNOSIS — E10.29 TYPE 1 DIABETES MELLITUS WITH MICROALBUMINURIA (HCC): ICD-10-CM

## 2024-08-01 DIAGNOSIS — E66.9 TYPE 1 DIABETES MELLITUS WITH OBESITY (HCC): ICD-10-CM

## 2024-08-01 RX ORDER — INSULIN LISPRO 200 [IU]/ML
INJECTION, SOLUTION SUBCUTANEOUS
Qty: 15 ML | Refills: 2 | Status: SHIPPED | OUTPATIENT
Start: 2024-08-01 | End: 2024-08-02 | Stop reason: SDUPTHER

## 2024-08-02 RX ORDER — INSULIN LISPRO 200 [IU]/ML
INJECTION, SOLUTION SUBCUTANEOUS
Qty: 15 ML | Refills: 2 | Status: SHIPPED | OUTPATIENT
Start: 2024-08-02

## 2024-08-02 NOTE — TELEPHONE ENCOUNTER
Pt stated that [facility] in [place] is out stock of her insulin lispro 200 unit     Pt is wanting to know if a new script for her Insulin lispro 200 unit can we sent to [facility] [address]     Please advise

## 2024-08-15 DIAGNOSIS — M05.79 RHEUMATOID ARTHRITIS INVOLVING MULTIPLE SITES WITH POSITIVE RHEUMATOID FACTOR (HCC): ICD-10-CM

## 2024-08-15 RX ORDER — PREDNISONE 10 MG/1
10 TABLET ORAL DAILY
Qty: 35 TABLET | Refills: 1 | Status: SHIPPED | OUTPATIENT
Start: 2024-08-15

## 2024-08-15 RX ORDER — ACYCLOVIR 200 MG/1
CAPSULE ORAL
Qty: 60 CAPSULE | Refills: 0 | Status: SHIPPED | OUTPATIENT
Start: 2024-08-15

## 2024-08-15 NOTE — TELEPHONE ENCOUNTER
Medication:   Requested Prescriptions     Pending Prescriptions Disp Refills    predniSONE (DELTASONE) 10 MG tablet [Pharmacy Med Name: PREDNISONE 10MG** TABLETS] 35 tablet 1     Sig: TAKE 1 TABLET BY MOUTH DAILY    acyclovir (ZOVIRAX) 200 MG capsule [Pharmacy Med Name: ACYCLOVIR 200MG CAPSULES] 60 capsule 0     Sig: TAKE 1 CAPSULE BY MOUTH EVERY 4 HOURS WHILE AWAKE FOR 10 DAYS        Last Filled:  5/30/24    Patient Phone Number: [phone] (home)     Last appt: 6/4/2024 acute  2/12/24- Return in about 3 months (around 5/12/2024) for Diabetes, Blood Pressure, High Cholesterol.   Next appt: Visit date not found    Last OARRS:        No data to display

## 2024-08-27 NOTE — TELEPHONE ENCOUNTER
-check FLP  -start statin   Medication:   Requested Prescriptions     Pending Prescriptions Disp Refills    Continuous Blood Gluc Sensor (DEXCOM G6 SENSOR) MISC [Pharmacy Med Name: [address] [place] [address] (3 PACK)] 3 each 5     Sig: CHANGE EVERY 10 DAYS        Last Filled:  02/09/23    Patient Phone Number: [phone] (home)     Last appt: 8/3/2023   Next appt: Visit date not found  Return in about 6 weeks (around 5/16/2023) for Diabetes.     Last OARRS:        No data to display

## 2024-09-04 DIAGNOSIS — E10.69 TYPE 1 DIABETES MELLITUS WITH OBESITY (HCC): ICD-10-CM

## 2024-09-04 DIAGNOSIS — R80.9 TYPE 1 DIABETES MELLITUS WITH MICROALBUMINURIA (HCC): ICD-10-CM

## 2024-09-04 DIAGNOSIS — E10.29 TYPE 1 DIABETES MELLITUS WITH MICROALBUMINURIA (HCC): ICD-10-CM

## 2024-09-04 DIAGNOSIS — E66.9 TYPE 1 DIABETES MELLITUS WITH OBESITY (HCC): ICD-10-CM

## 2024-09-04 RX ORDER — INSULIN LISPRO 200 [IU]/ML
INJECTION, SOLUTION SUBCUTANEOUS
Qty: 15 ML | Refills: 2 | Status: SHIPPED | OUTPATIENT
Start: 2024-09-04

## 2024-09-30 ENCOUNTER — OFFICE VISIT (OUTPATIENT)
Dept: ENDOCRINOLOGY | Age: 39
End: 2024-09-30
Payer: COMMERCIAL

## 2024-09-30 VITALS
WEIGHT: 278 LBS | SYSTOLIC BLOOD PRESSURE: 112 MMHG | HEART RATE: 77 BPM | BODY MASS INDEX: 46.32 KG/M2 | OXYGEN SATURATION: 97 % | DIASTOLIC BLOOD PRESSURE: 72 MMHG | HEIGHT: 65 IN

## 2024-09-30 DIAGNOSIS — E78.5 DYSLIPIDEMIA DUE TO TYPE 1 DIABETES MELLITUS (HCC): ICD-10-CM

## 2024-09-30 DIAGNOSIS — E10.42 POORLY CONTROLLED TYPE 1 DIABETES MELLITUS WITH PERIPHERAL NEUROPATHY (HCC): ICD-10-CM

## 2024-09-30 DIAGNOSIS — E10.69 DYSLIPIDEMIA DUE TO TYPE 1 DIABETES MELLITUS (HCC): ICD-10-CM

## 2024-09-30 DIAGNOSIS — R80.9 TYPE 1 DIABETES MELLITUS WITH MICROALBUMINURIA (HCC): Primary | ICD-10-CM

## 2024-09-30 DIAGNOSIS — E10.65 POORLY CONTROLLED TYPE 1 DIABETES MELLITUS WITH PERIPHERAL NEUROPATHY (HCC): ICD-10-CM

## 2024-09-30 DIAGNOSIS — E10.29 TYPE 1 DIABETES MELLITUS WITH MICROALBUMINURIA (HCC): ICD-10-CM

## 2024-09-30 DIAGNOSIS — E10.69 TYPE 1 DIABETES MELLITUS WITH OBESITY (HCC): ICD-10-CM

## 2024-09-30 DIAGNOSIS — E66.9 TYPE 1 DIABETES MELLITUS WITH OBESITY (HCC): ICD-10-CM

## 2024-09-30 DIAGNOSIS — R80.9 TYPE 1 DIABETES MELLITUS WITH MICROALBUMINURIA (HCC): ICD-10-CM

## 2024-09-30 DIAGNOSIS — E10.29 TYPE 1 DIABETES MELLITUS WITH MICROALBUMINURIA (HCC): Primary | ICD-10-CM

## 2024-09-30 PROCEDURE — 3052F HG A1C>EQUAL 8.0%<EQUAL 9.0%: CPT | Performed by: INTERNAL MEDICINE

## 2024-09-30 PROCEDURE — 1036F TOBACCO NON-USER: CPT | Performed by: INTERNAL MEDICINE

## 2024-09-30 PROCEDURE — 3074F SYST BP LT 130 MM HG: CPT | Performed by: INTERNAL MEDICINE

## 2024-09-30 PROCEDURE — 99214 OFFICE O/P EST MOD 30 MIN: CPT | Performed by: INTERNAL MEDICINE

## 2024-09-30 PROCEDURE — 95251 CONT GLUC MNTR ANALYSIS I&R: CPT | Performed by: INTERNAL MEDICINE

## 2024-09-30 PROCEDURE — G8427 DOCREV CUR MEDS BY ELIG CLIN: HCPCS | Performed by: INTERNAL MEDICINE

## 2024-09-30 PROCEDURE — 3078F DIAST BP <80 MM HG: CPT | Performed by: INTERNAL MEDICINE

## 2024-09-30 PROCEDURE — G8417 CALC BMI ABV UP PARAM F/U: HCPCS | Performed by: INTERNAL MEDICINE

## 2024-09-30 PROCEDURE — 2022F DILAT RTA XM EVC RTNOPTHY: CPT | Performed by: INTERNAL MEDICINE

## 2024-09-30 RX ORDER — HYDROCHLOROTHIAZIDE 25 MG/1
25 TABLET ORAL DAILY
COMMUNITY

## 2024-09-30 NOTE — PROGRESS NOTES
and affect. Patient behavior is normal.     Lab Review:    Office Visit on 02/12/2024   Component Date Value Ref Range Status    Hemoglobin A1C 02/12/2024 8.3  % Final   Admission on 12/19/2023, Discharged on 12/19/2023   Component Date Value Ref Range Status    Rapid Strep A Screen 12/19/2023 Negative  Negative Final    Strep A Culture 12/19/2023    Final                    Value:Heavy growth normal oral keyon with  Beta Strep not Group A,C, or G  Heavy growth      SARS-CoV-2, PCR 12/19/2023 Not Detected  Not Detected Final   Orders Only on 10/05/2023   Component Date Value Ref Range Status    Visual Acuity Distance Left Eye 10/05/2023 20/60   Final    Intraocular Pressure Eye 10/05/2023 24   Final    Diabetic Retinopathy 10/05/2023 NEGATIVE   Final    Cataracts 10/05/2023 NEGATIVE   Final    Glaucoma 10/05/2023 POSITIVE   Final    Visual Acuity Distance Left Eye 10/05/2023 20/60   Final    Intraocular Pressure Eye 10/05/2023 24   Final    Diabetic Retinopathy 10/05/2023 NEGATIVE   Final    Cataracts 10/05/2023 NEGATIVE   Final    Glaucoma 10/05/2023 POSITIVE   Final           Assessment and Plan     There are no diagnoses linked to this encounter.     1: Type 1 DM complicated wit nephropathy , hypoglycemias , neuropathy   Uncontrolled A1C 8.3% < 9% < 9.8% < 7.7%     Very high variability   Hypoglycemias present , unawareness also present   Brittle diabetes   She has dawns phenomenon      DEXCOM personal CGMS data downloaded and reviewed   Average glucose: 175 < 220 < 208  Blood sugars: Above 180 - 39 % ,  - 59 %, below 70 - 2% < 0.3%  Daily insulin use: 162 < 252 units   Average daily carbs: 123 < 477 grams   Basal: 54%, Bolus: 64% (food 47%, correction: 10%, control IQ 35%)   Blood sugars are generally high   Not counting carbs    During the day blood sugars are running well   Post meal hyperglycemia after carb diet   No activity related changes in blood sugars   Hypoglycemia: rarely   Based on the data,

## 2024-10-01 LAB
ALBUMIN SERPL-MCNC: 4 G/DL (ref 3.4–5)
ALBUMIN/GLOB SERPL: 1.4 {RATIO} (ref 1.1–2.2)
ALP SERPL-CCNC: 115 U/L (ref 40–129)
ALT SERPL-CCNC: 30 U/L (ref 10–40)
ANION GAP SERPL CALCULATED.3IONS-SCNC: 9 MMOL/L (ref 3–16)
AST SERPL-CCNC: 19 U/L (ref 15–37)
BILIRUB SERPL-MCNC: 0.3 MG/DL (ref 0–1)
BUN SERPL-MCNC: 11 MG/DL (ref 7–20)
CALCIUM SERPL-MCNC: 9.8 MG/DL (ref 8.3–10.6)
CHLORIDE SERPL-SCNC: 103 MMOL/L (ref 99–110)
CHOLEST SERPL-MCNC: 199 MG/DL (ref 0–199)
CO2 SERPL-SCNC: 30 MMOL/L (ref 21–32)
CREAT SERPL-MCNC: 0.6 MG/DL (ref 0.6–1.1)
EST. AVERAGE GLUCOSE BLD GHB EST-MCNC: 180 MG/DL
GFR SERPLBLD CREATININE-BSD FMLA CKD-EPI: >90 ML/MIN/{1.73_M2}
GLUCOSE SERPL-MCNC: 61 MG/DL (ref 70–99)
HBA1C MFR BLD: 7.9 %
HDLC SERPL-MCNC: 37 MG/DL (ref 40–60)
LDL CHOLESTEROL: 147 MG/DL
POTASSIUM SERPL-SCNC: 3.9 MMOL/L (ref 3.5–5.1)
PROT SERPL-MCNC: 6.8 G/DL (ref 6.4–8.2)
SODIUM SERPL-SCNC: 142 MMOL/L (ref 136–145)
TRIGL SERPL-MCNC: 77 MG/DL (ref 0–150)
VLDLC SERPL CALC-MCNC: 15 MG/DL

## 2024-10-07 DIAGNOSIS — T37.5X5A: Primary | ICD-10-CM

## 2024-10-07 RX ORDER — ACYCLOVIR 200 MG/1
CAPSULE ORAL
Qty: 60 CAPSULE | Refills: 0 | Status: SHIPPED | OUTPATIENT
Start: 2024-10-07

## 2024-10-07 NOTE — TELEPHONE ENCOUNTER
Medication:   Requested Prescriptions     Pending Prescriptions Disp Refills    acyclovir (ZOVIRAX) 200 MG capsule [Pharmacy Med Name: ACYCLOVIR 200MG CAPSULES] 60 capsule 0     Sig: TAKE 1 CAPSULE BY MOUTH EVERY 4 HOURS WHILE AWAKE FOR 10 DAYS        Last Filled:  8/15/2024    Patient Phone Number: [phone] (home)     Last appt: 6/4/2024 Return if symptoms worsen or fail to improve.  Next appt: Visit date not found    Last OARRS:        No data to display

## 2024-10-18 RX ORDER — HYDROCHLOROTHIAZIDE 25 MG/1
25 TABLET ORAL EVERY MORNING
Qty: 30 TABLET | Refills: 5 | Status: SHIPPED | OUTPATIENT
Start: 2024-10-18

## 2024-10-18 NOTE — TELEPHONE ENCOUNTER
Medication:   Requested Prescriptions     Pending Prescriptions Disp Refills    hydroCHLOROthiazide (HYDRODIURIL) 25 MG tablet [Pharmacy Med Name: HYDROCHLOROTHIAZIDE 25MG TABLETS] 30 tablet      Sig: TAKE 1 TABLET BY MOUTH EVERY MORNING        Last Filled:  reported     Patient Phone Number: [phone] (home)     Last appt: 6/4/2024   Next appt: Visit date not found    Last OARRS:        No data to display

## 2024-10-29 DIAGNOSIS — E10.69 TYPE 1 DIABETES MELLITUS WITH OBESITY (HCC): ICD-10-CM

## 2024-10-29 DIAGNOSIS — E66.9 TYPE 1 DIABETES MELLITUS WITH OBESITY (HCC): ICD-10-CM

## 2024-10-29 DIAGNOSIS — E10.29 TYPE 1 DIABETES MELLITUS WITH MICROALBUMINURIA (HCC): ICD-10-CM

## 2024-10-29 DIAGNOSIS — R80.9 TYPE 1 DIABETES MELLITUS WITH MICROALBUMINURIA (HCC): ICD-10-CM

## 2024-10-29 RX ORDER — INSULIN LISPRO 200 [IU]/ML
INJECTION, SOLUTION SUBCUTANEOUS
Qty: 15 ML | Refills: 2 | Status: SHIPPED | OUTPATIENT
Start: 2024-10-29

## 2024-10-29 NOTE — TELEPHONE ENCOUNTER
Requested Prescriptions     Pending Prescriptions Disp Refills    HUMALOG KWIKPEN 200 UNIT/ML SOPN pen [Pharmacy Med Name: HUMALOG 200 U/ML KWIKPEN INJ 3ML] 15 mL 2     Sig: ADMINISTER UP  UNITS UNDER THE SKIN EVERY DAY     Last refilled: 9/4/2024 # 15 ml with 2 refills     Lov: 9/30/2024  Nov: 1/6/2025

## 2024-11-14 DIAGNOSIS — T37.5X5A: ICD-10-CM

## 2024-11-14 DIAGNOSIS — M05.79 RHEUMATOID ARTHRITIS INVOLVING MULTIPLE SITES WITH POSITIVE RHEUMATOID FACTOR (HCC): ICD-10-CM

## 2024-11-14 RX ORDER — ACYCLOVIR 200 MG/1
CAPSULE ORAL
Qty: 60 CAPSULE | Refills: 0 | OUTPATIENT
Start: 2024-11-14

## 2024-11-14 RX ORDER — PREDNISONE 10 MG/1
10 TABLET ORAL DAILY
Qty: 35 TABLET | Refills: 1 | OUTPATIENT
Start: 2024-11-14

## 2024-11-14 NOTE — TELEPHONE ENCOUNTER
Medication:   Requested Prescriptions     Pending Prescriptions Disp Refills    acyclovir (ZOVIRAX) 200 MG capsule [Pharmacy Med Name: ACYCLOVIR 200MG CAPSULES] 60 capsule 0     Sig: TAKE 1 CAPSULE BY MOUTH EVERY 4 HOURS WHILE AWAKE FOR 10 DAYS    predniSONE (DELTASONE) 10 MG tablet [Pharmacy Med Name: PREDNISONE 10MG** TABLETS] 35 tablet 1     Sig: TAKE 1 TABLET BY MOUTH DAILY        Last Filled:  10/7/24    Patient Phone Number: [phone] (home)     Last appt: 6/4/2024   Next appt: Visit date not found    Last OARRS:        No data to display

## 2024-11-14 NOTE — TELEPHONE ENCOUNTER
Medication:   Requested Prescriptions     Pending Prescriptions Disp Refills    acyclovir (ZOVIRAX) 200 MG capsule [Pharmacy Med Name: ACYCLOVIR 200MG CAPSULES] 60 capsule 0     Sig: TAKE 1 CAPSULE BY MOUTH EVERY 4 HOURS WHILE AWAKE FOR 10 DAYS    predniSONE (DELTASONE) 10 MG tablet [Pharmacy Med Name: PREDNISONE 10MG** TABLETS] 35 tablet 1     Sig: TAKE 1 TABLET BY MOUTH DAILY        Last Filled:      Patient Phone Number: [phone] (home)     Last appt: 6/4/2024   Next appt: Visit date not found    Last OARRS:        No data to display

## 2024-11-20 DIAGNOSIS — M05.79 RHEUMATOID ARTHRITIS INVOLVING MULTIPLE SITES WITH POSITIVE RHEUMATOID FACTOR (HCC): ICD-10-CM

## 2024-11-20 DIAGNOSIS — T37.5X5A: ICD-10-CM

## 2024-11-20 RX ORDER — ACYCLOVIR 200 MG/1
CAPSULE ORAL
Qty: 60 CAPSULE | Refills: 0 | OUTPATIENT
Start: 2024-11-20

## 2024-11-20 RX ORDER — PREDNISONE 10 MG/1
10 TABLET ORAL DAILY
Qty: 35 TABLET | Refills: 1 | OUTPATIENT
Start: 2024-11-20

## 2024-11-20 NOTE — TELEPHONE ENCOUNTER
Medication:   Requested Prescriptions     Pending Prescriptions Disp Refills    predniSONE (DELTASONE) 10 MG tablet 35 tablet 1     Sig: Take 1 tablet by mouth daily    acyclovir (ZOVIRAX) 200 MG capsule 60 capsule 0        Last Filled:      Patient Phone Number: [phone] (home)     Last appt: 6/4/2024   Next appt: Visit date not found  Return in about 3 months (around 5/12/2024) for Diabetes, Blood Pressure, High Cholesterol.     Last OARRS:        No data to display

## 2024-11-25 ENCOUNTER — OFFICE VISIT (OUTPATIENT)
Dept: PRIMARY CARE CLINIC | Age: 39
End: 2024-11-25

## 2024-11-25 VITALS
WEIGHT: 278.6 LBS | BODY MASS INDEX: 49.36 KG/M2 | SYSTOLIC BLOOD PRESSURE: 132 MMHG | TEMPERATURE: 97.3 F | HEART RATE: 76 BPM | DIASTOLIC BLOOD PRESSURE: 73 MMHG | HEIGHT: 63 IN

## 2024-11-25 DIAGNOSIS — T37.5X5A: ICD-10-CM

## 2024-11-25 DIAGNOSIS — E66.813 CLASS 3 SEVERE OBESITY DUE TO EXCESS CALORIES WITH SERIOUS COMORBIDITY AND BODY MASS INDEX (BMI) OF 45.0 TO 49.9 IN ADULT: ICD-10-CM

## 2024-11-25 DIAGNOSIS — E10.65 POORLY CONTROLLED TYPE 1 DIABETES MELLITUS WITH PERIPHERAL NEUROPATHY (HCC): ICD-10-CM

## 2024-11-25 DIAGNOSIS — I15.2 HYPERTENSION ASSOCIATED WITH DIABETES (HCC): ICD-10-CM

## 2024-11-25 DIAGNOSIS — E66.01 CLASS 3 SEVERE OBESITY DUE TO EXCESS CALORIES WITH SERIOUS COMORBIDITY AND BODY MASS INDEX (BMI) OF 45.0 TO 49.9 IN ADULT: ICD-10-CM

## 2024-11-25 DIAGNOSIS — E11.59 HYPERTENSION ASSOCIATED WITH DIABETES (HCC): ICD-10-CM

## 2024-11-25 DIAGNOSIS — E10.42 POORLY CONTROLLED TYPE 1 DIABETES MELLITUS WITH PERIPHERAL NEUROPATHY (HCC): ICD-10-CM

## 2024-11-25 DIAGNOSIS — M05.79 RHEUMATOID ARTHRITIS INVOLVING MULTIPLE SITES WITH POSITIVE RHEUMATOID FACTOR (HCC): Primary | ICD-10-CM

## 2024-11-25 RX ORDER — PREDNISONE 10 MG/1
10 TABLET ORAL DAILY
Qty: 35 TABLET | Refills: 1 | Status: SHIPPED | OUTPATIENT
Start: 2024-11-25

## 2024-11-25 RX ORDER — ACYCLOVIR 200 MG/1
200 CAPSULE ORAL
Qty: 60 CAPSULE | Refills: 0 | Status: SHIPPED | OUTPATIENT
Start: 2024-11-25

## 2024-11-25 RX ORDER — HYDROCHLOROTHIAZIDE 25 MG/1
25 TABLET ORAL EVERY MORNING
Qty: 30 TABLET | Refills: 5 | Status: SHIPPED | OUTPATIENT
Start: 2024-11-25

## 2024-11-25 ASSESSMENT — ENCOUNTER SYMPTOMS
ABDOMINAL PAIN: 0
ABDOMINAL DISTENTION: 0
SHORTNESS OF BREATH: 0
CHEST TIGHTNESS: 0
DIARRHEA: 0
NAUSEA: 0

## 2024-11-25 NOTE — PROGRESS NOTES
2024     Marisa Aguilar (:  1985) is a 39 y.o. female, here for evaluation of the following medical concerns:    HPI  Rheumatoid Arthritis: Marisa presents today for evaluation of rheumatoid arthritis.  This is a longstanding problem for this patient, which was previously diagnosed in [place].  She has been a patient of our clinic for several years and referred to several rheumatologist, but has since been dismissed from their clinics, because she misses appointments.  She is currently managed on prednisone 10 mg, which has been weaned down from 20.  She complains of some stiffness and aching in her finger joints as well as wrists, but she has been out of her prednisone in the last few days.  Typically she is asymptomatic.    Hypertension:  Home blood pressure monitoring: No.  She is not adherent to a low sodium diet. Patient denies chest pain, shortness of breath, headache, lightheadedness, blurred vision, peripheral edema, palpitations, dry cough, and fatigue.  Antihypertensive medication side effects: no medication side effects noted.  Use of agents associated with hypertension: none.                                        Sodium (mmol/L)   Date Value   2024 142    BUN (mg/dL)   Date Value   2024 11    Glucose (mg/dL)   Date Value   2024 61 (L)      Potassium (mmol/L)   Date Value   2024 3.9    Creatinine (mg/dL)   Date Value   2024 0.6           Review of Systems   Constitutional:  Negative for activity change, appetite change, fatigue and unexpected weight change.   Eyes:  Negative for visual disturbance.   Respiratory:  Negative for chest tightness and shortness of breath.    Gastrointestinal:  Negative for abdominal distention, abdominal pain, diarrhea and nausea.   Endocrine: Negative for polydipsia, polyphagia and polyuria.   Genitourinary:  Negative for decreased urine volume, dysuria and frequency.   Musculoskeletal:  Positive for arthralgias. Negative

## 2024-11-27 DIAGNOSIS — E66.9 TYPE 1 DIABETES MELLITUS WITH OBESITY (HCC): ICD-10-CM

## 2024-11-27 DIAGNOSIS — E10.29 TYPE 1 DIABETES MELLITUS WITH MICROALBUMINURIA (HCC): ICD-10-CM

## 2024-11-27 DIAGNOSIS — R80.9 TYPE 1 DIABETES MELLITUS WITH MICROALBUMINURIA (HCC): ICD-10-CM

## 2024-11-27 DIAGNOSIS — E10.69 TYPE 1 DIABETES MELLITUS WITH OBESITY (HCC): ICD-10-CM

## 2024-11-27 RX ORDER — INSULIN LISPRO 200 [IU]/ML
INJECTION, SOLUTION SUBCUTANEOUS
Qty: 15 ML | Refills: 2 | Status: SHIPPED | OUTPATIENT
Start: 2024-11-27

## 2024-11-27 NOTE — TELEPHONE ENCOUNTER
Requested Prescriptions     Pending Prescriptions Disp Refills    HUMALOG KWIKPEN 200 UNIT/ML SOPN pen [Pharmacy Med Name: HUMALOG 200 U/ML KWIKPEN INJ 3ML] 15 mL 2     Sig: ADMINISTER UP  UNITS UNDER THE SKIN EVERY DAY     Last refilled: 10/29/2024 # 15 ml with 2 refills     Lov: 9/30/2024  Nov;1/6/2025

## 2024-12-28 DIAGNOSIS — T37.5X5A: ICD-10-CM

## 2024-12-30 RX ORDER — ACYCLOVIR 200 MG/1
CAPSULE ORAL
Qty: 60 CAPSULE | Refills: 0 | Status: SHIPPED | OUTPATIENT
Start: 2024-12-30

## 2024-12-30 NOTE — TELEPHONE ENCOUNTER
Medication:   Requested Prescriptions     Pending Prescriptions Disp Refills    acyclovir (ZOVIRAX) 200 MG capsule [Pharmacy Med Name: ACYCLOVIR 200MG CAPSULES] 60 capsule 0     Sig: TAKE 1 CAPSULE BY MOUTH EVERY 4 HOURS WHILE AWAKE        Last Filled:  11/25/2024    Patient Phone Number: [phone] (home)     Last appt: 11/25/2024   Next appt: 2/24/2025    Last OARRS:        No data to display

## 2025-01-06 ENCOUNTER — TELEMEDICINE (OUTPATIENT)
Dept: ENDOCRINOLOGY | Age: 40
End: 2025-01-06
Payer: COMMERCIAL

## 2025-01-06 DIAGNOSIS — E10.65 POORLY CONTROLLED TYPE 1 DIABETES MELLITUS WITH PERIPHERAL NEUROPATHY (HCC): ICD-10-CM

## 2025-01-06 DIAGNOSIS — E10.69 DYSLIPIDEMIA DUE TO TYPE 1 DIABETES MELLITUS (HCC): ICD-10-CM

## 2025-01-06 DIAGNOSIS — E66.9 TYPE 1 DIABETES MELLITUS WITH OBESITY (HCC): ICD-10-CM

## 2025-01-06 DIAGNOSIS — E10.69 TYPE 1 DIABETES MELLITUS WITH OBESITY (HCC): ICD-10-CM

## 2025-01-06 DIAGNOSIS — E78.5 DYSLIPIDEMIA DUE TO TYPE 1 DIABETES MELLITUS (HCC): ICD-10-CM

## 2025-01-06 DIAGNOSIS — R80.9 TYPE 1 DIABETES MELLITUS WITH MICROALBUMINURIA (HCC): Primary | ICD-10-CM

## 2025-01-06 DIAGNOSIS — E10.42 POORLY CONTROLLED TYPE 1 DIABETES MELLITUS WITH PERIPHERAL NEUROPATHY (HCC): ICD-10-CM

## 2025-01-06 DIAGNOSIS — E10.29 TYPE 1 DIABETES MELLITUS WITH MICROALBUMINURIA (HCC): Primary | ICD-10-CM

## 2025-01-06 PROCEDURE — 3046F HEMOGLOBIN A1C LEVEL >9.0%: CPT | Performed by: INTERNAL MEDICINE

## 2025-01-06 PROCEDURE — 2022F DILAT RTA XM EVC RTNOPTHY: CPT | Performed by: INTERNAL MEDICINE

## 2025-01-06 PROCEDURE — 99214 OFFICE O/P EST MOD 30 MIN: CPT | Performed by: INTERNAL MEDICINE

## 2025-01-06 PROCEDURE — 95251 CONT GLUC MNTR ANALYSIS I&R: CPT | Performed by: INTERNAL MEDICINE

## 2025-01-06 PROCEDURE — G8427 DOCREV CUR MEDS BY ELIG CLIN: HCPCS | Performed by: INTERNAL MEDICINE

## 2025-01-06 RX ORDER — EZETIMIBE 10 MG/1
10 TABLET ORAL DAILY
Qty: 90 TABLET | Refills: 3 | Status: SHIPPED | OUTPATIENT
Start: 2025-01-06

## 2025-01-06 NOTE — PROGRESS NOTES
exam.   Foot exam:  Sep 2024   Deformity/amputation: absent  Skin lesions/pre-ulcerative calluses: absent  Edema: right- negative, left- negative  Sensory exam: Monofilament sensation: normal  Pulses: normal, Vibration (128 Hz): severely reduced     Renal screen: 142 > 166 > 40 - June 2023   TSH screen: June 2023   Saw CDE in past, not interested at this time .     2: HTN   Controlled     3: Hyperlipidemia    <127 < 11, HDL 37, Tgs 77 - Sep 2024    crestor 10 mg daily - stopped due to facial flushing.   Out of zetia 10 mg daily , restart now  May consider atorvastatin of cholesterol next time after the labs       Discussed adherence to medical plan, follow ups, appointments, eye exams   Multiple cancellations  Labs and refills on the visit      Labs today: A1C, lipids, CMP, MACR      RTC in 3 months   Follow up every 3 months      Will do driving papers when received   Adherence to medical appointments discussed       EDUCATION:   Greater than 50% of this visit was spent in general counseling regarding obesity, diet, exercise, importance of adherence to insulin regime, recognition and treatment of hypo and hyperglycemia,  glucose logging, proper diabetes management, diabetic complications with poor management and the importance of glycemic control in order to avoid the complications of diabetes.    Risks and potential complications of diabetes were reviewed with the patient.  Diabetes health maintenance plan and follow-up were discussed and understood by the patient.  We reviewed the importance of medication compliance and regular follow-up.   Aggressive lifestyle modification was encouraged.     Exercise Counselling:  This patient is a candidate for regular physical exercise. Instructions to perform the following types of exercise:  Swimming or water aerobic exercise  Brisk walking  Playing tennis  Stationary bicycle or elliptical indoor  Low impact aerobic exercise    Instructions given to exercise for the

## 2025-01-10 NOTE — TELEPHONE ENCOUNTER
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 I discussed with ED to make the referral on discharge.     Patient remains with poor prognosis overall despite all care.  acyclovir (ZOVIRAX) 200 MG capsule [[id number]]   Order Details   Dose: 200 mg Route: Oral Frequency: EVERY 4 HOURS WHILE AWAKE   Dispense Quantity: 60 capsule Refills: 0         Sig: Take 1 capsule by mouth every 4 hours (while awake) for 10 days        Start Date: 08/05/21 End Date: 08/15/21 after 50 doses   Written Date: 08/05/21 Expiration Date: 08/05/22   Original Order: acyclovir (ZOVIRAX) 200 MG capsule [[id number]]   Providers  Authorizing Provider: Fletcher Rojas DO NPI: [id number]   Ordering User: Fletcher Rojas DO        Pharmacy  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 I discussed with ED to make the referral on discharge.     Patient remains with poor prognosis overall despite all care.

## 2025-01-20 DIAGNOSIS — E10.29 TYPE 1 DIABETES MELLITUS WITH MICROALBUMINURIA (HCC): ICD-10-CM

## 2025-01-20 DIAGNOSIS — E10.69 TYPE 1 DIABETES MELLITUS WITH OBESITY (HCC): ICD-10-CM

## 2025-01-20 DIAGNOSIS — E66.9 TYPE 1 DIABETES MELLITUS WITH OBESITY (HCC): ICD-10-CM

## 2025-01-20 DIAGNOSIS — R80.9 TYPE 1 DIABETES MELLITUS WITH MICROALBUMINURIA (HCC): ICD-10-CM

## 2025-01-21 RX ORDER — INSULIN LISPRO 200 [IU]/ML
INJECTION, SOLUTION SUBCUTANEOUS
Qty: 15 ML | Refills: 2 | Status: SHIPPED | OUTPATIENT
Start: 2025-01-21

## 2025-01-21 NOTE — TELEPHONE ENCOUNTER
Requested Prescriptions     Pending Prescriptions Disp Refills    HUMALOG KWIKPEN 200 UNIT/ML SOPN pen [Pharmacy Med Name: HUMALOG 200 U/ML KWIKPEN INJ 3ML] 15 mL 2     Sig: ADMINISTER UP  UNITS UNDER THE SKIN EVERY DAY     LOV:VV 1/6/2025  NOV:4/7/2025

## 2025-02-10 ENCOUNTER — TELEPHONE (OUTPATIENT)
Dept: ENDOCRINOLOGY | Age: 40
End: 2025-02-10

## 2025-02-10 NOTE — TELEPHONE ENCOUNTER
Mrs. Aguilar informed no recent eye exam on file.   She will get updated eye exam and have the report faxed to the office.   I explained the last eye exam on file was from 2023.  She ask that the report be kept on file until she has the eye exam.

## 2025-02-10 NOTE — TELEPHONE ENCOUNTER
MARILYNOM to contact the office.  I have received her BMV form to be completed.  Will need her diabetic eye exam report to complete.

## 2025-02-11 DIAGNOSIS — T37.5X5A: ICD-10-CM

## 2025-02-11 RX ORDER — ACYCLOVIR 200 MG/1
CAPSULE ORAL
Qty: 60 CAPSULE | Refills: 0 | Status: SHIPPED | OUTPATIENT
Start: 2025-02-11

## 2025-02-11 NOTE — TELEPHONE ENCOUNTER
Medication:   Requested Prescriptions     Pending Prescriptions Disp Refills    acyclovir (ZOVIRAX) 200 MG capsule [Pharmacy Med Name: ACYCLOVIR 200MG CAPSULES] 60 capsule 0     Sig: TAKE 1 CAPSULE BY MOUTH EVERY 4 HOURS WHILE AWAKE        Last Filled:  12/30/24    Patient Phone Number: [phone] (home)     Last appt: 11/25/2024   Next appt: 2/24/2025    Last OARRS:        No data to display

## 2025-02-14 ENCOUNTER — TELEPHONE (OUTPATIENT)
Dept: PRIMARY CARE CLINIC | Age: 40
End: 2025-02-14

## 2025-02-14 DIAGNOSIS — M05.79 RHEUMATOID ARTHRITIS INVOLVING MULTIPLE SITES WITH POSITIVE RHEUMATOID FACTOR (HCC): Primary | ICD-10-CM

## 2025-02-14 NOTE — TELEPHONE ENCOUNTER
Pt found a rheumatologist who accepts her insurance and needs a referral faxed to     Dr. Gillespie  Fax [phone]

## 2025-02-14 NOTE — TELEPHONE ENCOUNTER
Pt called to check status on forms. I let her know I do not see the eye exam and provided fax number to send to us again

## 2025-02-17 ENCOUNTER — TELEPHONE (OUTPATIENT)
Dept: ENDOCRINOLOGY | Age: 40
End: 2025-02-17

## 2025-02-18 ENCOUNTER — TELEPHONE (OUTPATIENT)
Dept: ENDOCRINOLOGY | Age: 40
End: 2025-02-18

## 2025-02-18 NOTE — TELEPHONE ENCOUNTER
Pt called in looking for a form that she dropped off last week.  Can you please call the pt and let her know if it is ready and if she can come in and pick it up.

## 2025-02-23 DIAGNOSIS — E10.69 TYPE 1 DIABETES MELLITUS WITH OBESITY (HCC): ICD-10-CM

## 2025-02-23 DIAGNOSIS — R80.9 TYPE 1 DIABETES MELLITUS WITH MICROALBUMINURIA (HCC): ICD-10-CM

## 2025-02-23 DIAGNOSIS — E10.29 TYPE 1 DIABETES MELLITUS WITH MICROALBUMINURIA (HCC): ICD-10-CM

## 2025-02-23 DIAGNOSIS — E66.9 TYPE 1 DIABETES MELLITUS WITH OBESITY (HCC): ICD-10-CM

## 2025-02-24 RX ORDER — INSULIN LISPRO 200 [IU]/ML
INJECTION, SOLUTION SUBCUTANEOUS
Qty: 30 ML | Refills: 2 | Status: SHIPPED | OUTPATIENT
Start: 2025-02-24

## 2025-02-24 NOTE — TELEPHONE ENCOUNTER
Requested Prescriptions     Pending Prescriptions Disp Refills    HUMALOG KWIKPEN 200 UNIT/ML SOPN pen [Pharmacy Med Name: HUMALOG 200 U/ML KWIKPEN INJ 3ML] 15 mL 2     Sig: ADMINISTER UP  UNITS UNDER THE SKIN EVERY DAY     Last refilled: 1/21/2025 # 15 ml with 2 refills    Lov: VV 1/6/2025  Nov: 4/7/2025

## 2025-03-04 ENCOUNTER — TELEMEDICINE (OUTPATIENT)
Dept: PRIMARY CARE CLINIC | Age: 40
End: 2025-03-04
Payer: COMMERCIAL

## 2025-03-04 DIAGNOSIS — J20.9 ACUTE BRONCHITIS, UNSPECIFIED ORGANISM: Primary | ICD-10-CM

## 2025-03-04 PROCEDURE — 99213 OFFICE O/P EST LOW 20 MIN: CPT | Performed by: STUDENT IN AN ORGANIZED HEALTH CARE EDUCATION/TRAINING PROGRAM

## 2025-03-04 PROCEDURE — 1036F TOBACCO NON-USER: CPT | Performed by: STUDENT IN AN ORGANIZED HEALTH CARE EDUCATION/TRAINING PROGRAM

## 2025-03-04 PROCEDURE — G8427 DOCREV CUR MEDS BY ELIG CLIN: HCPCS | Performed by: STUDENT IN AN ORGANIZED HEALTH CARE EDUCATION/TRAINING PROGRAM

## 2025-03-04 PROCEDURE — G8417 CALC BMI ABV UP PARAM F/U: HCPCS | Performed by: STUDENT IN AN ORGANIZED HEALTH CARE EDUCATION/TRAINING PROGRAM

## 2025-03-04 RX ORDER — PREDNISONE 20 MG/1
40 TABLET ORAL DAILY
Qty: 10 TABLET | Refills: 0 | Status: SHIPPED | OUTPATIENT
Start: 2025-03-04 | End: 2025-03-09

## 2025-03-04 SDOH — ECONOMIC STABILITY: FOOD INSECURITY: WITHIN THE PAST 12 MONTHS, YOU WORRIED THAT YOUR FOOD WOULD RUN OUT BEFORE YOU GOT MONEY TO BUY MORE.: NEVER TRUE

## 2025-03-04 SDOH — ECONOMIC STABILITY: FOOD INSECURITY: WITHIN THE PAST 12 MONTHS, THE FOOD YOU BOUGHT JUST DIDN'T LAST AND YOU DIDN'T HAVE MONEY TO GET MORE.: NEVER TRUE

## 2025-03-04 ASSESSMENT — ENCOUNTER SYMPTOMS
SORE THROAT: 1
SINUS PRESSURE: 0
SHORTNESS OF BREATH: 1
COUGH: 1
RHINORRHEA: 0
CHEST TIGHTNESS: 1
WHEEZING: 1
SINUS PAIN: 0

## 2025-03-04 ASSESSMENT — PATIENT HEALTH QUESTIONNAIRE - PHQ9
2. FEELING DOWN, DEPRESSED OR HOPELESS: NOT AT ALL
SUM OF ALL RESPONSES TO PHQ QUESTIONS 1-9: 0
1. LITTLE INTEREST OR PLEASURE IN DOING THINGS: NOT AT ALL
SUM OF ALL RESPONSES TO PHQ QUESTIONS 1-9: 0

## 2025-03-10 ENCOUNTER — TELEPHONE (OUTPATIENT)
Dept: ENDOCRINOLOGY | Age: 40
End: 2025-03-10

## 2025-03-10 DIAGNOSIS — J20.9 ACUTE BRONCHITIS, UNSPECIFIED ORGANISM: ICD-10-CM

## 2025-03-10 RX ORDER — PREDNISONE 20 MG/1
40 TABLET ORAL DAILY
Qty: 10 TABLET | Refills: 0 | Status: SHIPPED | OUTPATIENT
Start: 2025-03-10 | End: 2025-03-11

## 2025-03-10 NOTE — TELEPHONE ENCOUNTER
Medication:   Requested Prescriptions     Pending Prescriptions Disp Refills    predniSONE (DELTASONE) 20 MG tablet [Pharmacy Med Name: PREDNISONE 20MG TABLETS] 10 tablet 0     Sig: TAKE 2 TABLETS BY MOUTH DAILY FOR 5 DAYS        Last Filled:    11/25/24  Patient Phone Number: [phone] (home)     Last appt: 3/4/2025   Next appt: 3/11/2025    Last OARRS:        No data to display

## 2025-03-11 ENCOUNTER — OFFICE VISIT (OUTPATIENT)
Dept: PRIMARY CARE CLINIC | Age: 40
End: 2025-03-11

## 2025-03-11 VITALS
TEMPERATURE: 98.2 F | WEIGHT: 282.4 LBS | BODY MASS INDEX: 50.02 KG/M2 | HEART RATE: 84 BPM | SYSTOLIC BLOOD PRESSURE: 135 MMHG | DIASTOLIC BLOOD PRESSURE: 84 MMHG

## 2025-03-11 DIAGNOSIS — I15.2 HYPERTENSION ASSOCIATED WITH DIABETES (HCC): ICD-10-CM

## 2025-03-11 DIAGNOSIS — E10.69 DYSLIPIDEMIA DUE TO TYPE 1 DIABETES MELLITUS (HCC): ICD-10-CM

## 2025-03-11 DIAGNOSIS — E10.29 MICROALBUMINURIA DUE TO TYPE 1 DIABETES MELLITUS (HCC): ICD-10-CM

## 2025-03-11 DIAGNOSIS — M05.79 RHEUMATOID ARTHRITIS INVOLVING MULTIPLE SITES WITH POSITIVE RHEUMATOID FACTOR (HCC): ICD-10-CM

## 2025-03-11 DIAGNOSIS — E11.59 HYPERTENSION ASSOCIATED WITH DIABETES (HCC): ICD-10-CM

## 2025-03-11 DIAGNOSIS — E10.65 POORLY CONTROLLED TYPE 1 DIABETES MELLITUS WITH PERIPHERAL NEUROPATHY (HCC): Primary | ICD-10-CM

## 2025-03-11 DIAGNOSIS — R80.9 MICROALBUMINURIA DUE TO TYPE 1 DIABETES MELLITUS (HCC): ICD-10-CM

## 2025-03-11 DIAGNOSIS — E66.813 CLASS 3 SEVERE OBESITY DUE TO EXCESS CALORIES WITH SERIOUS COMORBIDITY AND BODY MASS INDEX (BMI) OF 45.0 TO 49.9 IN ADULT: ICD-10-CM

## 2025-03-11 DIAGNOSIS — E66.01 CLASS 3 SEVERE OBESITY DUE TO EXCESS CALORIES WITH SERIOUS COMORBIDITY AND BODY MASS INDEX (BMI) OF 45.0 TO 49.9 IN ADULT: ICD-10-CM

## 2025-03-11 DIAGNOSIS — E10.42 POORLY CONTROLLED TYPE 1 DIABETES MELLITUS WITH PERIPHERAL NEUROPATHY (HCC): Primary | ICD-10-CM

## 2025-03-11 DIAGNOSIS — E78.5 DYSLIPIDEMIA DUE TO TYPE 1 DIABETES MELLITUS (HCC): ICD-10-CM

## 2025-03-11 LAB — HBA1C MFR BLD: 7.9 %

## 2025-03-11 RX ORDER — EZETIMIBE 10 MG/1
10 TABLET ORAL DAILY
Qty: 90 TABLET | Refills: 3 | Status: SHIPPED | OUTPATIENT
Start: 2025-03-11

## 2025-03-11 RX ORDER — HYDROCHLOROTHIAZIDE 25 MG/1
25 TABLET ORAL EVERY MORNING
Qty: 30 TABLET | Refills: 5 | Status: SHIPPED | OUTPATIENT
Start: 2025-03-11

## 2025-03-11 RX ORDER — PREDNISONE 10 MG/1
10 TABLET ORAL DAILY
Qty: 35 TABLET | Refills: 1 | Status: SHIPPED | OUTPATIENT
Start: 2025-03-11

## 2025-03-11 RX ORDER — LISINOPRIL 10 MG/1
10 TABLET ORAL DAILY
Qty: 30 TABLET | Refills: 5 | Status: SHIPPED | OUTPATIENT
Start: 2025-03-11

## 2025-03-11 ASSESSMENT — ENCOUNTER SYMPTOMS
ABDOMINAL DISTENTION: 0
ABDOMINAL PAIN: 0
CHEST TIGHTNESS: 0
DIARRHEA: 0
SHORTNESS OF BREATH: 0
NAUSEA: 0

## 2025-03-11 NOTE — PROGRESS NOTES
3/11/2025     Marisa Aguilar (:  1985) is a 39 y.o. female, here for evaluation of the following medical concerns:    HPI  Diabetes Mellitus Type 2: Current symptoms/problems include neuropathy.    Medication compliance:  compliant most of the time  Diabetic diet compliance:  noncompliant: POOR choices ,  Weight trend: stable  Current exercise: no regular exercise  Barriers: lack of motivation, financial, and overwhelmed by complexity of regimen    Home blood sugar records: fasting range: 160s, postprandial range: 180-260  Any episodes of hypoglycemia? no  Eye exam current (within one year): yes   reports that she has never smoked. She has never used smokeless tobacco.   Daily Aspirin? N/A    Lab Results   Component Value Date    LABA1C 7.9 2025    LABA1C 7.9 2024    LABA1C 8.3 2024     Lab Results   Component Value Date    CREATININE 0.6 2024     Lab Results   Component Value Date    ALT 30 2024    AST 19 2024     Lab Results   Component Value Date    HDL 37 (L) 2024        Hyperlipidemia:  No new myalgias or GI upset on eztemibe (Zetia). Medication compliance: compliant most of the time. Patient is not following a low fat, low cholesterol diet.  She is not exercising regularly.     Lab Results   Component Value Date    HDL 37 (L) 2024     Lab Results   Component Value Date    ALT 30 2024    AST 19 2024        Hypertension:  Home blood pressure monitoring: No.  She is not adherent to a low sodium diet. Patient denies chest pain, shortness of breath, headache, lightheadedness, blurred vision, peripheral edema, palpitations, dry cough, and fatigue.  Antihypertensive medication side effects: no medication side effects noted.  Use of agents associated with hypertension: none.                                        Sodium (mmol/L)   Date Value   2024 142    BUN (mg/dL)   Date Value   2024 11    Glucose (mg/dL)   Date Value

## 2025-03-12 NOTE — TELEPHONE ENCOUNTER
The medication is APPROVED 3/12/2025.    Authorization Expiration Date: 3/11/2026.    Your PA request for [id number] was approved for 365 days. The PA# assigned is [id number].    If this requires a response please respond to the pool ( P [facility] MEDICATION PRE-AUTH).      Thank you please advise patient.

## 2025-03-12 NOTE — TELEPHONE ENCOUNTER
Submitted PA for Humalog KwikPen [facility]: [id number] STATUS: PENDING.    Follow up done daily; if no decision with in three days we will refax.  If another three days goes by with no decision will call the insurance for status.

## 2025-03-30 DIAGNOSIS — T37.5X5A: ICD-10-CM

## 2025-03-31 RX ORDER — ACYCLOVIR 200 MG/1
CAPSULE ORAL
Qty: 60 CAPSULE | Refills: 0 | Status: SHIPPED | OUTPATIENT
Start: 2025-03-31

## 2025-03-31 NOTE — TELEPHONE ENCOUNTER
Medication:   Requested Prescriptions     Pending Prescriptions Disp Refills    acyclovir (ZOVIRAX) 200 MG capsule [Pharmacy Med Name: ACYCLOVIR 200MG CAPSULES] 60 capsule 0     Sig: TAKE 1 CAPSULE BY MOUTH EVERY 4 HOURS WHILE AWAKE        Last Filled:  2/11/25    Patient Phone Number: [phone] (home)     Last appt: 3/11/2025   Next appt: 7/8/2025    Last OARRS:        No data to display

## 2025-04-07 ENCOUNTER — TELEMEDICINE (OUTPATIENT)
Dept: ENDOCRINOLOGY | Age: 40
End: 2025-04-07
Payer: COMMERCIAL

## 2025-04-07 DIAGNOSIS — E10.29 TYPE 1 DIABETES MELLITUS WITH MICROALBUMINURIA (HCC): Primary | ICD-10-CM

## 2025-04-07 DIAGNOSIS — E10.649 HYPOGLYCEMIA DUE TO TYPE 1 DIABETES MELLITUS (HCC): ICD-10-CM

## 2025-04-07 DIAGNOSIS — E10.69 DYSLIPIDEMIA DUE TO TYPE 1 DIABETES MELLITUS (HCC): ICD-10-CM

## 2025-04-07 DIAGNOSIS — R80.9 TYPE 1 DIABETES MELLITUS WITH MICROALBUMINURIA (HCC): Primary | ICD-10-CM

## 2025-04-07 DIAGNOSIS — E66.9 TYPE 1 DIABETES MELLITUS WITH OBESITY (HCC): ICD-10-CM

## 2025-04-07 DIAGNOSIS — E10.69 TYPE 1 DIABETES MELLITUS WITH OBESITY (HCC): ICD-10-CM

## 2025-04-07 DIAGNOSIS — E78.5 DYSLIPIDEMIA DUE TO TYPE 1 DIABETES MELLITUS (HCC): ICD-10-CM

## 2025-04-07 PROCEDURE — 99214 OFFICE O/P EST MOD 30 MIN: CPT | Performed by: INTERNAL MEDICINE

## 2025-04-07 PROCEDURE — 2022F DILAT RTA XM EVC RTNOPTHY: CPT | Performed by: INTERNAL MEDICINE

## 2025-04-07 PROCEDURE — 3051F HG A1C>EQUAL 7.0%<8.0%: CPT | Performed by: INTERNAL MEDICINE

## 2025-04-07 PROCEDURE — G8427 DOCREV CUR MEDS BY ELIG CLIN: HCPCS | Performed by: INTERNAL MEDICINE

## 2025-04-07 PROCEDURE — 95251 CONT GLUC MNTR ANALYSIS I&R: CPT | Performed by: INTERNAL MEDICINE

## 2025-04-07 RX ORDER — DIPHENHYDRAMINE HYDROCHLORIDE 25 MG/1
CAPSULE, LIQUID FILLED ORAL
Qty: 1 KIT | Refills: 0 | Status: SHIPPED | OUTPATIENT
Start: 2025-04-07

## 2025-04-07 RX ORDER — GLUCOSAMINE HCL/CHONDROITIN SU 500-400 MG
CAPSULE ORAL
Qty: 100 STRIP | Refills: 3 | Status: SHIPPED | OUTPATIENT
Start: 2025-04-07

## 2025-04-07 RX ORDER — IBUPROFEN 600 MG/1
TABLET ORAL
Qty: 1 KIT | Refills: 5 | Status: SHIPPED | OUTPATIENT
Start: 2025-04-07

## 2025-04-07 RX ORDER — AVOBENZONE, HOMOSALATE, OCTISALATE, OCTOCRYLENE 30; 40; 45; 26 MG/ML; MG/ML; MG/ML; MG/ML
1 CREAM TOPICAL DAILY
Qty: 100 EACH | Refills: 11 | Status: SHIPPED | OUTPATIENT
Start: 2025-04-07

## 2025-04-07 NOTE — PROGRESS NOTES
Last Year: 0     Homeless in the Last Year: No       Past Medical History:   Diagnosis Date    Arthritis     Diabetes mellitus (HCC)     Hypertension     Sleep apnea        Past Surgical History:   Procedure Laterality Date     SECTION      HAND SURGERY Bilateral        No Known Allergies      Current Outpatient Medications:     acyclovir (ZOVIRAX) 200 MG capsule, TAKE 1 CAPSULE BY MOUTH EVERY 4 HOURS WHILE AWAKE, Disp: 60 capsule, Rfl: 0    hydroCHLOROthiazide (HYDRODIURIL) 25 MG tablet, Take 1 tablet by mouth every morning, Disp: 30 tablet, Rfl: 5    ezetimibe (ZETIA) 10 MG tablet, Take 1 tablet by mouth daily, Disp: 90 tablet, Rfl: 3    lisinopril (PRINIVIL;ZESTRIL) 10 MG tablet, Take 1 tablet by mouth daily, Disp: 30 tablet, Rfl: 5    predniSONE (DELTASONE) 10 MG tablet, Take 1 tablet by mouth daily, Disp: 35 tablet, Rfl: 1    HUMALOG KWIKPEN 200 UNIT/ML SOPN pen, ADMINISTER UP  UNITS UNDER THE SKIN EVERY DAY, Disp: 30 mL, Rfl: 2    Continuous Glucose Sensor (DEXCOM G6 SENSOR) MISC, , Disp: , Rfl:     Insulin Syringe-Needle U-100 ([employer] INS SYR .3CC/29G) 29G X 1/2\" 0.3 ML MISC, 1 each by Does not apply route daily, Disp: 300 each, Rfl: 3    Lancets MISC, 1 each by Does not apply route 2 times daily, Disp: 100 each, Rfl: 5    insulin glargine (LANTUS SOLOSTAR) 100 UNIT/ML injection pen, ADMINISTER 100 UNITS UNDER THE SKIN EVERY NIGHT (Patient not taking: Reported on 2025), Disp: 30 mL, Rfl: 5      Review of Systems:    Constitutional: Negative for fever, chills, and unexpected weight change.   HENT: Negative for congestion, ear pain, rhinorrhea,  sore throat and trouble swallowing.   Eyes: Negative for photophobia, redness, itching.   Respiratory: Negative for cough, shortness of breath and sputum.   Cardiovascular: Negative for chest pain, palpitations and leg swelling.   Gastrointestinal: Negative for nausea, vomiting, abdominal pain, diarrhea, constipation.   Endocrine: Negative for cold

## 2025-05-16 DIAGNOSIS — E66.9 TYPE 1 DIABETES MELLITUS WITH OBESITY (HCC): ICD-10-CM

## 2025-05-16 DIAGNOSIS — E10.29 TYPE 1 DIABETES MELLITUS WITH MICROALBUMINURIA (HCC): ICD-10-CM

## 2025-05-16 DIAGNOSIS — E10.69 TYPE 1 DIABETES MELLITUS WITH OBESITY (HCC): ICD-10-CM

## 2025-05-16 DIAGNOSIS — R80.9 TYPE 1 DIABETES MELLITUS WITH MICROALBUMINURIA (HCC): ICD-10-CM

## 2025-05-16 RX ORDER — INSULIN LISPRO 200 [IU]/ML
INJECTION, SOLUTION SUBCUTANEOUS
Qty: 30 ML | Refills: 1 | Status: SHIPPED | OUTPATIENT
Start: 2025-05-16

## 2025-05-16 NOTE — TELEPHONE ENCOUNTER
Call from pt stating that she is leaving out state early tomorrow morning and would a refill before she leaves

## 2025-05-19 DIAGNOSIS — T37.5X5A: ICD-10-CM

## 2025-05-19 RX ORDER — ACYCLOVIR 200 MG/1
CAPSULE ORAL
Qty: 60 CAPSULE | Refills: 0 | Status: SHIPPED | OUTPATIENT
Start: 2025-05-19

## 2025-05-19 NOTE — TELEPHONE ENCOUNTER
Medication:   Requested Prescriptions     Pending Prescriptions Disp Refills    acyclovir (ZOVIRAX) 200 MG capsule [Pharmacy Med Name: ACYCLOVIR 200MG CAPSULES] 60 capsule 0     Sig: TAKE 1 CAPSULE BY MOUTH EVERY 4 HOURS WHILE AWAKE        Last Filled:  3/31/25    Patient Phone Number: [phone] (home)     Last appt: 3/11/2025   Next appt: 7/8/2025    Last OARRS:        No data to display

## 2025-05-21 ENCOUNTER — TELEPHONE (OUTPATIENT)
Dept: ENDOCRINOLOGY | Age: 40
End: 2025-05-21

## 2025-05-29 DIAGNOSIS — E10.29 TYPE 1 DIABETES MELLITUS WITH MICROALBUMINURIA (HCC): ICD-10-CM

## 2025-05-29 DIAGNOSIS — R80.9 TYPE 1 DIABETES MELLITUS WITH MICROALBUMINURIA (HCC): ICD-10-CM

## 2025-05-29 DIAGNOSIS — E66.9 TYPE 1 DIABETES MELLITUS WITH OBESITY (HCC): ICD-10-CM

## 2025-05-29 DIAGNOSIS — E10.69 TYPE 1 DIABETES MELLITUS WITH OBESITY (HCC): ICD-10-CM

## 2025-05-30 RX ORDER — INSULIN LISPRO 200 [IU]/ML
INJECTION, SOLUTION SUBCUTANEOUS
Qty: 30 ML | Refills: 1 | OUTPATIENT
Start: 2025-05-30

## 2025-05-30 NOTE — TELEPHONE ENCOUNTER
Requested Prescriptions     Pending Prescriptions Disp Refills    HUMALOG KWIKPEN 200 UNIT/ML SOPN pen [Pharmacy Med Name: HUMALOG 200 U/ML KWIKPEN INJ 3ML] 30 mL 1     Sig: ADMINISTER UP  UNITS UNDER THE SKIN EVERY DAY     Last refilled : 5/16/2025 # 30 ml with 1 refill     Refill requested too soon

## 2025-06-22 DIAGNOSIS — E10.65 POORLY CONTROLLED TYPE 1 DIABETES MELLITUS WITH PERIPHERAL NEUROPATHY (HCC): Primary | ICD-10-CM

## 2025-06-22 DIAGNOSIS — E10.42 POORLY CONTROLLED TYPE 1 DIABETES MELLITUS WITH PERIPHERAL NEUROPATHY (HCC): Primary | ICD-10-CM

## 2025-06-23 RX ORDER — PROCHLORPERAZINE 25 MG/1
SUPPOSITORY RECTAL
Qty: 3 EACH | Refills: 0 | Status: SHIPPED | OUTPATIENT
Start: 2025-06-23

## 2025-06-23 NOTE — TELEPHONE ENCOUNTER
Medication:   Requested Prescriptions     Pending Prescriptions Disp Refills    Continuous Glucose Sensor (DEXCOM G6 SENSOR) MISC [Pharmacy Med Name: DEXCOM G6 SENSOR (3 PACK)] 3 each      Sig: USE AS DIRECTED, CHANGE SENSORS EVERY 10 DAYS        Last Filled:  reported 5/24/24    Patient Phone Number: [phone] (home)     Last appt: 3/11/2025   Next appt: 7/8/2025    Last OARRS:        No data to display

## 2025-06-30 NOTE — TELEPHONE ENCOUNTER
Medication:   Requested Prescriptions     Pending Prescriptions Disp Refills    acyclovir (ZOVIRAX) 200 MG capsule [Pharmacy Med Name: ACYCLOVIR 200MG CAPSULES] 60 capsule 0     Sig: TAKE 1 CAPSULE BY MOUTH EVERY 4 HOURS WHILE AWAKE FOR 10 DAYS        Last Filled: 04/16/24     Patient Phone Number: [phone] (home)     Last appt: 6/4/2024 Return in about 3 months (around 5/12/2024) for Diabetes, Blood Pressure, High Cholesterol. PATIENT NEEDS DIABETES F/U APPT. NEEDS TO DO LABS AS WELL  Next appt: Visit date not found    Last OARRS:        No data to display                   What Is The Reason For Today's Visit?: Full Body Skin Examination

## 2025-07-16 DIAGNOSIS — E10.69 TYPE 1 DIABETES MELLITUS WITH OBESITY (HCC): ICD-10-CM

## 2025-07-16 DIAGNOSIS — E10.29 TYPE 1 DIABETES MELLITUS WITH MICROALBUMINURIA (HCC): ICD-10-CM

## 2025-07-16 DIAGNOSIS — E66.9 TYPE 1 DIABETES MELLITUS WITH OBESITY (HCC): ICD-10-CM

## 2025-07-16 DIAGNOSIS — R80.9 TYPE 1 DIABETES MELLITUS WITH MICROALBUMINURIA (HCC): ICD-10-CM

## 2025-07-21 RX ORDER — INSULIN LISPRO 200 [IU]/ML
INJECTION, SOLUTION SUBCUTANEOUS
Qty: 30 ML | Refills: 1 | OUTPATIENT
Start: 2025-07-21

## 2025-08-03 DIAGNOSIS — T37.5X5A: ICD-10-CM

## 2025-08-04 RX ORDER — ACYCLOVIR 200 MG/1
CAPSULE ORAL
Qty: 60 CAPSULE | Refills: 0 | Status: SHIPPED | OUTPATIENT
Start: 2025-08-04

## 2025-08-28 ENCOUNTER — TELEPHONE (OUTPATIENT)
Dept: ENDOCRINOLOGY | Age: 40
End: 2025-08-28